# Patient Record
Sex: FEMALE | Race: WHITE | Employment: FULL TIME | URBAN - METROPOLITAN AREA
[De-identification: names, ages, dates, MRNs, and addresses within clinical notes are randomized per-mention and may not be internally consistent; named-entity substitution may affect disease eponyms.]

---

## 2017-01-03 ENCOUNTER — HOSPITAL ENCOUNTER (OUTPATIENT)
Dept: PHYSICAL THERAPY | Age: 62
Discharge: HOME | End: 2017-01-03
Admitting: PHYSICAL MEDICINE & REHABILITATION

## 2017-01-05 ENCOUNTER — HOSPITAL ENCOUNTER (OUTPATIENT)
Dept: PHYSICAL THERAPY | Age: 62
Discharge: HOME | End: 2017-01-05
Admitting: PHYSICAL MEDICINE & REHABILITATION

## 2017-01-10 ENCOUNTER — HOSPITAL ENCOUNTER (OUTPATIENT)
Dept: PHYSICAL THERAPY | Age: 62
Discharge: HOME | End: 2017-01-10
Admitting: PHYSICAL MEDICINE & REHABILITATION

## 2017-01-12 ENCOUNTER — HOSPITAL ENCOUNTER (OUTPATIENT)
Dept: PHYSICAL THERAPY | Age: 62
Discharge: HOME | End: 2017-01-12
Admitting: PHYSICAL MEDICINE & REHABILITATION

## 2018-08-20 ENCOUNTER — OFFICE VISIT (OUTPATIENT)
Dept: FAMILY MEDICINE CLINIC | Age: 63
End: 2018-08-20

## 2018-08-20 VITALS
RESPIRATION RATE: 12 BRPM | BODY MASS INDEX: 31.92 KG/M2 | WEIGHT: 187 LBS | SYSTOLIC BLOOD PRESSURE: 112 MMHG | DIASTOLIC BLOOD PRESSURE: 68 MMHG | HEIGHT: 64 IN | HEART RATE: 65 BPM | OXYGEN SATURATION: 98 %

## 2018-08-20 DIAGNOSIS — E66.9 OBESITY, CLASS I, BMI 30.0-34.9 (SEE ACTUAL BMI): ICD-10-CM

## 2018-08-20 DIAGNOSIS — Z11.4 SCREENING FOR HUMAN IMMUNODEFICIENCY VIRUS WITHOUT PRESENCE OF RISK FACTORS: ICD-10-CM

## 2018-08-20 DIAGNOSIS — Z12.4 ENCOUNTER FOR PAPANICOLAOU SMEAR FOR CERVICAL CANCER SCREENING: ICD-10-CM

## 2018-08-20 DIAGNOSIS — Z11.59 ENCOUNTER FOR HCV SCREENING TEST FOR LOW RISK PATIENT: ICD-10-CM

## 2018-08-20 DIAGNOSIS — I10 ESSENTIAL HYPERTENSION: Primary | ICD-10-CM

## 2018-08-20 DIAGNOSIS — M47.9 OSTEOARTHRITIS OF SPINE, UNSPECIFIED SPINAL OSTEOARTHRITIS COMPLICATION STATUS, UNSPECIFIED SPINAL REGION: ICD-10-CM

## 2018-08-20 DIAGNOSIS — M85.80 OSTEOPENIA, UNSPECIFIED LOCATION: ICD-10-CM

## 2018-08-20 DIAGNOSIS — Z86.010 HISTORY OF COLON POLYPS: ICD-10-CM

## 2018-08-20 DIAGNOSIS — M54.2 NECK PAIN: ICD-10-CM

## 2018-08-20 DIAGNOSIS — G43.109 MIGRAINE WITH AURA AND WITHOUT STATUS MIGRAINOSUS, NOT INTRACTABLE: ICD-10-CM

## 2018-08-20 PROBLEM — E78.00 HYPERCHOLESTEROLEMIA: Status: ACTIVE | Noted: 2018-08-20

## 2018-08-20 PROBLEM — Z86.0100 HISTORY OF COLON POLYPS: Status: ACTIVE | Noted: 2018-08-20

## 2018-08-20 PROBLEM — E66.811 OBESITY, CLASS I, BMI 30.0-34.9 (SEE ACTUAL BMI): Status: ACTIVE | Noted: 2018-08-20

## 2018-08-20 PROCEDURE — 99204 OFFICE O/P NEW MOD 45 MIN: CPT | Performed by: INTERNAL MEDICINE

## 2018-08-20 RX ORDER — BACLOFEN 10 MG/1
10 TABLET ORAL
COMMUNITY
Start: 2016-12-19 | End: 2018-08-20 | Stop reason: SDUPTHER

## 2018-08-20 RX ORDER — LISINOPRIL 10 MG/1
TABLET ORAL
Qty: 90 TABLET | Refills: 2 | Status: SHIPPED | OUTPATIENT
Start: 2018-08-20 | End: 2018-12-21 | Stop reason: SDUPTHER

## 2018-08-20 RX ORDER — BACLOFEN 10 MG/1
TABLET ORAL
Qty: 30 TABLET | Refills: 0 | Status: SHIPPED | OUTPATIENT
Start: 2018-08-20 | End: 2018-08-24 | Stop reason: SDUPTHER

## 2018-08-20 ASSESSMENT — PATIENT HEALTH QUESTIONNAIRE - PHQ9
SUM OF ALL RESPONSES TO PHQ QUESTIONS 1-9: 0
1. LITTLE INTEREST OR PLEASURE IN DOING THINGS: 0
2. FEELING DOWN, DEPRESSED OR HOPELESS: 0
SUM OF ALL RESPONSES TO PHQ9 QUESTIONS 1 & 2: 0
SUM OF ALL RESPONSES TO PHQ QUESTIONS 1-9: 0

## 2018-08-20 NOTE — LETTER
[address] [facility] [address] [id number]  2018 February  MAMM-SCREENING DIRECT DIGITAL2/15/2018  [facility]  Result Impression   IMPRESSION: Benign findings    BI-RADS CATEGORY: 2 - BENIGN    RECOMMENDED FOLLOW-UP: Bilateral Follow up Mamm in 1 Yr. This study was performed and interpreted using full-field digital  mammographic and computer-aided detection. The [employer] of Radiology recommends annual screening  mammography for women age 36 and above. Please note that mammography is not 100% accurate in diagnosing breast  cancer. A routine breast examination is recommended to correlate with  mammographic findings. Signed By: Serena Correia MD, Heber Steward   Result Narrative   FULL-FIELD DIGITAL SCREENING MAMMOGRAPHY    REASON FOR EXAMINATION: Screening    COMPARISON: Mammograms dating back to 9/10/2013    DENSITY: There are scattered areas of fibroglandular density. FINDINGS:   There is no suspicious mass or architectural distortion. There are small, stable circumscribed masses in both breasts. There are no suspicious grouped calcifications. A few scattered  calcifications are unchanged. Other Result Information   Interface, Incoming Radiology Results - 02/15/2018 10:42 AM EST  FULL-FIELD DIGITAL SCREENING MAMMOGRAPHY    REASON FOR EXAMINATION: Screening    COMPARISON: Mammograms dating back to 9/10/2013    DENSITY: There are scattered areas of fibroglandular density. FINDINGS:   There is no suspicious mass or architectural distortion. There are small, stable circumscribed masses in both breasts. There are no suspicious grouped calcifications. A few scattered  calcifications are unchanged. IMPRESSION: Benign findings    BI-RADS CATEGORY: 2 - BENIGN    RECOMMENDED FOLLOW-UP: Bilateral Follow up Mamm in 1 Yr. This study was performed and interpreted using full-field digital  mammographic and computer-aided detection.

## 2018-08-20 NOTE — PROGRESS NOTES
HPI: Charo Mercedes presents for refills and new PCP with insurance change    HTN  medication was started secondary to eye changes seen by an ophthalmologist. Does not check her blood pressure. Is walking intermittently. Does hills. No chest pain palpitations or lower extremity edema. No prediabetes or hyperlipidemia on last testing. Is fasting today. Never had any functional studies. No family history of early myocardial infarction. Low back pain. DJD neck and low back. Aquatic therapy was helpful. Left buttock pain. Uses baclofen and motrin prn. No bowel or bladder symptoms with this. Symptoms appear to be stable.   no abnormal. No breast or ovarian cancer. No STD concerns. Rarely sexually active. No STD concerns. Is up-to-date with her mammograms. Specialists    optho    Physical medicine/rehab    Dermatology    PMH:    Ankle fracture     Colonoscopy with polyps             SH  and daughter. 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 . Work from home area and rare alcohol. No tobacco or recreational drugs. FH 4 sisters    +sister with colon issue but not sure what was, DM, MI,     - breast or ovarian cancer, mental illness    Use systems: Ocular migraines. No concussions or seizures. Up-to-date with eye exams. Glasses. Up-to-date with dentist. Denies any wheezing pneumonias abnormal chest x-rays. No chest pain palpitations. No breast masses or discharge. Rare GE reflux. Positive colon polyps to recheck in 2019. Denies any kidney stones. Remote urinary tract infection. No vaginal discharge. Mild dyspareunia. Follows with dermatology. History of basal cell? No depression or anxiety. BMI is up. No knee pain. Knows apnea.         Constitutional, ent, CV, respiratory, GI, , joint, skin, allergic and psychiatric ROS negative except for above    Allergies   Allergen Reactions    Sulfa Antibiotics Hives       Outpatient Prescriptions Marked as Taking for the 16 vagina. No suspicious skin lesions or nodules. Chemistry        Component Value Date/Time     05/24/2015 0459    K 4.2 05/24/2015 0459     05/24/2015 0459    CO2 23 05/24/2015 0459    BUN 14 05/24/2015 0459    CREATININE 0.5 (L) 05/24/2015 0459        Component Value Date/Time    CALCIUM 8.8 05/24/2015 0459            Lab Results   Component Value Date    WBC 6.6 05/23/2015    HGB 11.2 (L) 05/24/2015    HCT 34.4 (L) 05/24/2015    MCV 88.2 05/23/2015     05/23/2015     No results found for: LABA1C  No results found for: EAG  No results found for: LABA1C  No components found for: CHLPL  No results found for: TRIG  No results found for: HDL  No results found for: LDLCALC  No results found for: LABVLDL    Old labs and records reviewed or requested  Discussed past lab and studies with patient      Diagnosis Orders   1. Essential hypertension  Lipid Panel    Basic Metabolic Panel   2. Neck pain     3. Osteoarthritis of spine, unspecified spinal osteoarthritis complication status, unspecified spinal region     4. Obesity, Class I, BMI 30.0-34.9 (see actual BMI)  Hemoglobin A1C   5. Osteopenia, unspecified location  Vitamin D 25 Hydroxy   6. History of colon polyps  CBC   7. Migraine with aura and without status migrainosus, not intractable     8. Screening for human immunodeficiency virus without presence of risk factors  HIV Screen   9. Encounter for HCV screening test for low risk patient  Hepatitis C Antibody   10. Encounter for Papanicolaou smear for cervical cancer screening  PAP SMEAR     Hypertension good control on current medication. We'll continue on with this. Refill given. Basic metabolic profile. Lipid profile. Routine health maintenance we'll check hepatitis C and HIV. No high risk factors. Obesity. A1c to be obtained. Pap most likely to be her last.    Release of records for her colonoscopy.  Recheck in 2019        Return in about 1 month (around 9/20/2018), or MA weight check 6 month BP follow up. Diagnosis and treatment discussed.   Possible side effects of medication reviewed  Patients questions answered  Follow up understood  Pt aware if they are not contacted about any test results , this does not mean they are normal.  They should call

## 2018-08-20 NOTE — PATIENT INSTRUCTIONS
Patient Education        Well Visit, Women 48 to 72: Care Instructions  Your Care Instructions    Physical exams can help you stay healthy. Your doctor has checked your overall health and may have suggested ways to take good care of yourself. He or she also may have recommended tests. At home, you can help prevent illness with healthy eating, regular exercise, and other steps. Follow-up care is a key part of your treatment and safety. Be sure to make and go to all appointments, and call your doctor if you are having problems. It's also a good idea to know your test results and keep a list of the medicines you take. How can you care for yourself at home? · Reach and stay at a healthy weight. This will lower your risk for many problems, such as obesity, diabetes, heart disease, and high blood pressure. · Get at least 30 minutes of exercise on most days of the week. Walking is a good choice. You also may want to do other activities, such as running, swimming, cycling, or playing tennis or team sports. · Do not smoke. Smoking can make health problems worse. If you need help quitting, talk to your doctor about stop-smoking programs and medicines. These can increase your chances of quitting for good. · Protect your skin from too much sun. When you're outdoors from 10 a.m. to 4 p.m., stay in the shade or cover up with clothing and a hat with a wide brim. Wear sunglasses that block UV rays. Even when it's cloudy, put broad-spectrum sunscreen (SPF 30 or higher) on any exposed skin. · See a dentist one or two times a year for checkups and to have your teeth cleaned. · Wear a seat belt in the car. · Limit alcohol to 1 drink a day. Too much alcohol can cause health problems. Follow your doctor's advice about when to have certain tests. These tests can spot problems early. · Cholesterol.  Your doctor will tell you how often to have this done based on your age, family history, or other things that can increase your smoke or have diabetes to show what your risk for a heart attack or stroke is over the next 10 years. When should you call for help? Watch closely for changes in your health, and be sure to contact your doctor if you have any problems or symptoms that concern you. Where can you learn more? Go to https://chpepiceweb.healthTrippin In. org and sign in to your Qianmi account. Enter N575 in the [facility] box to learn more about \"Well Visit, Women 50 to 72: Care Instructions. \"     If you do not have an account, please click on the \"Sign Up Now\" link. Current as of: May 16, 2017  Content Version: 11.7  © 6473-3400 [employer]. Care instructions adapted under license by [facility] ([facility]). If you have questions about a medical condition or this instruction, always ask your healthcare professional. [address] any warranty or liability for your use of this information. Patient Education        Learning About Low-Carbohydrate Diets for Weight Loss  What is a low-carbohydrate diet? Low-carb diets avoid foods that are high in carbohydrate. These high-carb foods include pasta, bread, rice, cereal, fruits, and starchy vegetables. Instead, these diets usually have you eat foods that are high in fat and protein. Many people lose weight quickly on a low-carb diet. But the early weight loss is water. People on this diet often gain the weight back after they start eating carbs again. Not all diet plans are safe or work well. A lot of the evidence shows that low-carb diets aren't healthy. That's because these diets often don't include healthy foods like fruits and vegetables. Losing weight safely means balancing protein, fat, and carbs with every meal and snack. And low-carb diets don't always provide the vitamins, minerals, and fiber you need. If you have a serious medical condition, talk to your doctor before you try any diet.  These conditions include kidney disease, heart Weight Loss Plan: Care Instructions  Your Care Instructions    If you are thinking about losing weight, it can be hard to know where to start. Your doctor can help you set up a weight loss plan that best meets your needs. You may want to take a class on nutrition or exercise, or join a weight loss support group. If you have questions about how to make changes to your eating or exercise habits, ask your doctor about seeing a registered dietitian or an exercise specialist.  It can be a big challenge to lose weight. But you do not have to make huge changes at once. Make small changes, and stick with them. When those changes become habit, add a few more changes. If you do not think you are ready to make changes right now, try to pick a date in the future. Make an appointment to see your doctor to discuss whether the time is right for you to start a plan. Follow-up care is a key part of your treatment and safety. Be sure to make and go to all appointments, and call your doctor if you are having problems. It's also a good idea to know your test results and keep a list of the medicines you take. How can you care for yourself at home? · Set realistic goals. Many people expect to lose much more weight than is likely. A weight loss of 5% to 10% of your body weight may be enough to improve your health. · Get family and friends involved to provide support. Talk to them about why you are trying to lose weight, and ask them to help. They can help by participating in exercise and having meals with you, even if they may be eating something different. · Find what works best for you. If you do not have time or do not like to cook, a program that offers meal replacement bars or shakes may be better for you. Or if you like to prepare meals, finding a plan that includes daily menus and recipes may be best.  · Ask your doctor about other health professionals who can help you achieve your weight loss goals.   ¨ A dietitian can help you make healthy changes in your diet. ¨ An exercise specialist or  can help you develop a safe and effective exercise program.  ¨ A counselor or psychiatrist can help you cope with issues such as depression, anxiety, or family problems that can make it hard to focus on weight loss. · Consider joining a support group for people who are trying to lose weight. Your doctor can suggest groups in your area. Where can you learn more? Go to https://TechFaith Wireless TechnologypeNSL Renewable Powereb.Profig. org and sign in to your AdventureDrop account. Enter P397 in the [employer] box to learn more about \"Starting a Weight Loss Plan: Care Instructions. \"     If you do not have an account, please click on the \"Sign Up Now\" link. Current as of: October 9, 2017  Content Version: 11.7  © 7114-6841 [employer]. Care instructions adapted under license by [facility] ([facility]). If you have questions about a medical condition or this instruction, always ask your healthcare professional. [address] any warranty or liability for your use of this information.

## 2018-08-23 ENCOUNTER — TELEPHONE (OUTPATIENT)
Dept: FAMILY MEDICINE CLINIC | Age: 63
End: 2018-08-23

## 2018-08-23 LAB
HPV COMMENT: NORMAL
HPV TYPE 16: NOT DETECTED
HPV TYPE 18: NOT DETECTED
HPVOH (OTHER TYPES): NOT DETECTED

## 2018-08-24 ENCOUNTER — TELEPHONE (OUTPATIENT)
Dept: FAMILY MEDICINE CLINIC | Age: 63
End: 2018-08-24

## 2018-08-24 RX ORDER — BACLOFEN 10 MG/1
TABLET ORAL
Qty: 30 TABLET | Refills: 0 | Status: SHIPPED | OUTPATIENT
Start: 2018-08-24 | End: 2019-02-27 | Stop reason: SDUPTHER

## 2018-08-27 ENCOUNTER — TELEPHONE (OUTPATIENT)
Dept: FAMILY MEDICINE CLINIC | Age: 63
End: 2018-08-27

## 2018-08-27 LAB
AVERAGE GLUCOSE: NORMAL
BASOPHILS ABSOLUTE: 51 /ΜL
BASOPHILS RELATIVE PERCENT: 1.1 %
BUN BLDV-MCNC: 17 MG/DL
CALCIUM SERPL-MCNC: 9.8 MG/DL
CHLORIDE BLD-SCNC: 104 MMOL/L
CHOLESTEROL, TOTAL: 232 MG/DL
CHOLESTEROL/HDL RATIO: 2.8
CO2: 26 MMOL/L
CREAT SERPL-MCNC: 0.75 MG/DL
EOSINOPHILS ABSOLUTE: 138 /ΜL
EOSINOPHILS RELATIVE PERCENT: 3 %
GFR CALCULATED: NORMAL
GLUCOSE BLD-MCNC: 102 MG/DL
HBA1C MFR BLD: 5.7 %
HCT VFR BLD CALC: 39.4 % (ref 36–46)
HDLC SERPL-MCNC: 83 MG/DL (ref 35–70)
HEMOGLOBIN: 12.9 G/DL (ref 12–16)
LDL CHOLESTEROL CALCULATED: 134 MG/DL (ref 0–160)
LYMPHOCYTES ABSOLUTE: 2291 /ΜL
LYMPHOCYTES RELATIVE PERCENT: 49.8 %
MCH RBC QN AUTO: 28.4 PG
MCHC RBC AUTO-ENTMCNC: 32.7 G/DL
MCV RBC AUTO: 86.6 FL
MONOCYTES ABSOLUTE: 340 /ΜL
MONOCYTES RELATIVE PERCENT: 7.4 %
NEUTROPHILS ABSOLUTE: 1780 /ΜL
NEUTROPHILS RELATIVE PERCENT: 38.7 %
PDW BLD-RTO: 13.1 %
PLATELET # BLD: 261 K/ΜL
PMV BLD AUTO: 9.7 FL
POTASSIUM SERPL-SCNC: 4.5 MMOL/L
RBC # BLD: 4.55 10^6/ΜL
SODIUM BLD-SCNC: 139 MMOL/L
TRIGL SERPL-MCNC: 59 MG/DL
VLDLC SERPL CALC-MCNC: ABNORMAL MG/DL
WBC # BLD: 4.6 10^3/ML

## 2018-08-28 LAB — HIV AG/AB: NON REACTIVE

## 2018-09-20 ENCOUNTER — NURSE ONLY (OUTPATIENT)
Dept: FAMILY MEDICINE CLINIC | Age: 63
End: 2018-09-20

## 2018-09-20 VITALS — BODY MASS INDEX: 32.1 KG/M2 | WEIGHT: 187 LBS

## 2018-09-20 DIAGNOSIS — Z23 NEED FOR INFLUENZA VACCINATION: Primary | ICD-10-CM

## 2018-09-20 PROCEDURE — 90682 RIV4 VACC RECOMBINANT DNA IM: CPT | Performed by: INTERNAL MEDICINE

## 2018-09-20 PROCEDURE — 90471 IMMUNIZATION ADMIN: CPT | Performed by: INTERNAL MEDICINE

## 2018-09-20 NOTE — PROGRESS NOTES
Vaccine Information Sheet, \"Influenza - Inactivated\"  given to Charo Mercedes, or parent/legal guardian of  Charo Mercedes and verbalized understanding. Patient responses:    Have you ever had a reaction to a flu vaccine? No  Are you able to eat eggs without adverse effects? Yes  Do you have any current illness? No  Have you ever had [place] Syndrome? No    Flu vaccine given per order. Please see immunization tab.     Given in left deltoid-BR

## 2018-12-21 DIAGNOSIS — I10 ESSENTIAL HYPERTENSION: Primary | ICD-10-CM

## 2018-12-21 RX ORDER — LISINOPRIL 10 MG/1
TABLET ORAL
Qty: 90 TABLET | Refills: 2 | Status: SHIPPED | OUTPATIENT
Start: 2018-12-21 | End: 2019-08-27 | Stop reason: SDUPTHER

## 2018-12-28 NOTE — TELEPHONE ENCOUNTER
Patient requesting a medication refill.   Medication: Lisinopril  Dosage: 10 mg  Frequency: 1 po q day for blood pressure  Last filled on: 12/21/2018  Pharmacy: [facility]  Next office visit: 02/27/2019

## 2019-02-27 ENCOUNTER — OFFICE VISIT (OUTPATIENT)
Dept: FAMILY MEDICINE CLINIC | Age: 64
End: 2019-02-27
Payer: MEDICARE

## 2019-02-27 VITALS
SYSTOLIC BLOOD PRESSURE: 119 MMHG | BODY MASS INDEX: 31.58 KG/M2 | HEIGHT: 64 IN | DIASTOLIC BLOOD PRESSURE: 80 MMHG | WEIGHT: 185 LBS | RESPIRATION RATE: 12 BRPM | HEART RATE: 62 BPM | OXYGEN SATURATION: 99 %

## 2019-02-27 DIAGNOSIS — Z23 NEED FOR PNEUMOCOCCAL VACCINATION: ICD-10-CM

## 2019-02-27 DIAGNOSIS — E78.00 HYPERCHOLESTEROLEMIA: ICD-10-CM

## 2019-02-27 DIAGNOSIS — M85.862 OTHER SPECIFIED DISORDERS OF BONE DENSITY AND STRUCTURE, LEFT LOWER LEG: ICD-10-CM

## 2019-02-27 DIAGNOSIS — M85.80 OSTEOPENIA, UNSPECIFIED LOCATION: ICD-10-CM

## 2019-02-27 DIAGNOSIS — Z86.010 HISTORY OF COLON POLYPS: ICD-10-CM

## 2019-02-27 DIAGNOSIS — M70.62 TROCHANTERIC BURSITIS OF LEFT HIP: Primary | ICD-10-CM

## 2019-02-27 DIAGNOSIS — E66.9 OBESITY, CLASS I, BMI 30.0-34.9 (SEE ACTUAL BMI): ICD-10-CM

## 2019-02-27 DIAGNOSIS — I10 ESSENTIAL HYPERTENSION: ICD-10-CM

## 2019-02-27 DIAGNOSIS — M47.9 OSTEOARTHRITIS OF SPINE, UNSPECIFIED SPINAL OSTEOARTHRITIS COMPLICATION STATUS, UNSPECIFIED SPINAL REGION: ICD-10-CM

## 2019-02-27 PROCEDURE — G8417 CALC BMI ABV UP PARAM F/U: HCPCS | Performed by: INTERNAL MEDICINE

## 2019-02-27 PROCEDURE — 1090F PRES/ABSN URINE INCON ASSESS: CPT | Performed by: INTERNAL MEDICINE

## 2019-02-27 PROCEDURE — G8400 PT W/DXA NO RESULTS DOC: HCPCS | Performed by: INTERNAL MEDICINE

## 2019-02-27 PROCEDURE — 4040F PNEUMOC VAC/ADMIN/RCVD: CPT | Performed by: INTERNAL MEDICINE

## 2019-02-27 PROCEDURE — G8427 DOCREV CUR MEDS BY ELIG CLIN: HCPCS | Performed by: INTERNAL MEDICINE

## 2019-02-27 PROCEDURE — G8482 FLU IMMUNIZE ORDER/ADMIN: HCPCS | Performed by: INTERNAL MEDICINE

## 2019-02-27 PROCEDURE — 90670 PCV13 VACCINE IM: CPT | Performed by: INTERNAL MEDICINE

## 2019-02-27 PROCEDURE — 99214 OFFICE O/P EST MOD 30 MIN: CPT | Performed by: INTERNAL MEDICINE

## 2019-02-27 PROCEDURE — G0009 ADMIN PNEUMOCOCCAL VACCINE: HCPCS | Performed by: INTERNAL MEDICINE

## 2019-02-27 PROCEDURE — 1123F ACP DISCUSS/DSCN MKR DOCD: CPT | Performed by: INTERNAL MEDICINE

## 2019-02-27 PROCEDURE — 1101F PT FALLS ASSESS-DOCD LE1/YR: CPT | Performed by: INTERNAL MEDICINE

## 2019-02-27 PROCEDURE — 3017F COLORECTAL CA SCREEN DOC REV: CPT | Performed by: INTERNAL MEDICINE

## 2019-02-27 PROCEDURE — 1036F TOBACCO NON-USER: CPT | Performed by: INTERNAL MEDICINE

## 2019-02-27 RX ORDER — BACLOFEN 10 MG/1
TABLET ORAL
Qty: 30 TABLET | Refills: 0 | Status: SHIPPED | OUTPATIENT
Start: 2019-02-27 | End: 2019-07-02

## 2019-02-27 ASSESSMENT — PATIENT HEALTH QUESTIONNAIRE - PHQ9
1. LITTLE INTEREST OR PLEASURE IN DOING THINGS: 0
2. FEELING DOWN, DEPRESSED OR HOPELESS: 0
SUM OF ALL RESPONSES TO PHQ QUESTIONS 1-9: 0
SUM OF ALL RESPONSES TO PHQ9 QUESTIONS 1 & 2: 0
SUM OF ALL RESPONSES TO PHQ QUESTIONS 1-9: 0

## 2019-04-15 ENCOUNTER — HOSPITAL ENCOUNTER (OUTPATIENT)
Dept: WOMENS IMAGING | Age: 64
Discharge: HOME | End: 2019-04-15
Payer: MEDICARE

## 2019-04-15 DIAGNOSIS — M85.80 OSTEOPENIA, UNSPECIFIED LOCATION: ICD-10-CM

## 2019-04-15 DIAGNOSIS — M85.862 OTHER SPECIFIED DISORDERS OF BONE DENSITY AND STRUCTURE, LEFT LOWER LEG: ICD-10-CM

## 2019-04-15 PROCEDURE — 77080 DXA BONE DENSITY AXIAL: CPT

## 2019-07-02 RX ORDER — BACLOFEN 10 MG/1
TABLET ORAL
Qty: 27 TABLET | Refills: 0 | Status: SHIPPED | OUTPATIENT
Start: 2019-07-02 | End: 2019-08-27 | Stop reason: SDUPTHER

## 2019-08-26 PROBLEM — Z12.11 SCREEN FOR COLON CANCER: Status: ACTIVE | Noted: 2018-08-20

## 2019-08-27 ENCOUNTER — OFFICE VISIT (OUTPATIENT)
Dept: FAMILY MEDICINE CLINIC | Age: 64
End: 2019-08-27
Payer: MEDICARE

## 2019-08-27 VITALS
WEIGHT: 178 LBS | HEIGHT: 64 IN | RESPIRATION RATE: 12 BRPM | SYSTOLIC BLOOD PRESSURE: 130 MMHG | DIASTOLIC BLOOD PRESSURE: 81 MMHG | HEART RATE: 60 BPM | BODY MASS INDEX: 30.39 KG/M2 | OXYGEN SATURATION: 99 %

## 2019-08-27 DIAGNOSIS — M47.9 OSTEOARTHRITIS OF SPINE, UNSPECIFIED SPINAL OSTEOARTHRITIS COMPLICATION STATUS, UNSPECIFIED SPINAL REGION: ICD-10-CM

## 2019-08-27 DIAGNOSIS — I10 ESSENTIAL HYPERTENSION: Primary | ICD-10-CM

## 2019-08-27 DIAGNOSIS — E78.00 HYPERCHOLESTEROLEMIA: ICD-10-CM

## 2019-08-27 DIAGNOSIS — E66.9 OBESITY, CLASS I, BMI 30.0-34.9 (SEE ACTUAL BMI): ICD-10-CM

## 2019-08-27 DIAGNOSIS — R73.03 PREDIABETES: ICD-10-CM

## 2019-08-27 LAB
ANION GAP SERPL CALCULATED.3IONS-SCNC: 14 MMOL/L (ref 3–16)
BUN BLDV-MCNC: 14 MG/DL (ref 7–20)
CALCIUM SERPL-MCNC: 9.8 MG/DL (ref 8.3–10.6)
CHLORIDE BLD-SCNC: 103 MMOL/L (ref 99–110)
CO2: 26 MMOL/L (ref 21–32)
CREAT SERPL-MCNC: 0.7 MG/DL (ref 0.6–1.2)
GFR AFRICAN AMERICAN: >60
GFR NON-AFRICAN AMERICAN: >60
GLUCOSE BLD-MCNC: 97 MG/DL (ref 70–99)
POTASSIUM SERPL-SCNC: 4.3 MMOL/L (ref 3.5–5.1)
SODIUM BLD-SCNC: 143 MMOL/L (ref 136–145)

## 2019-08-27 PROCEDURE — G8427 DOCREV CUR MEDS BY ELIG CLIN: HCPCS | Performed by: INTERNAL MEDICINE

## 2019-08-27 PROCEDURE — G8417 CALC BMI ABV UP PARAM F/U: HCPCS | Performed by: INTERNAL MEDICINE

## 2019-08-27 PROCEDURE — 1090F PRES/ABSN URINE INCON ASSESS: CPT | Performed by: INTERNAL MEDICINE

## 2019-08-27 PROCEDURE — 4040F PNEUMOC VAC/ADMIN/RCVD: CPT | Performed by: INTERNAL MEDICINE

## 2019-08-27 PROCEDURE — 1123F ACP DISCUSS/DSCN MKR DOCD: CPT | Performed by: INTERNAL MEDICINE

## 2019-08-27 PROCEDURE — 36415 COLL VENOUS BLD VENIPUNCTURE: CPT | Performed by: INTERNAL MEDICINE

## 2019-08-27 PROCEDURE — 99214 OFFICE O/P EST MOD 30 MIN: CPT | Performed by: INTERNAL MEDICINE

## 2019-08-27 PROCEDURE — 3017F COLORECTAL CA SCREEN DOC REV: CPT | Performed by: INTERNAL MEDICINE

## 2019-08-27 PROCEDURE — G8399 PT W/DXA RESULTS DOCUMENT: HCPCS | Performed by: INTERNAL MEDICINE

## 2019-08-27 PROCEDURE — 1036F TOBACCO NON-USER: CPT | Performed by: INTERNAL MEDICINE

## 2019-08-27 RX ORDER — LISINOPRIL 10 MG/1
TABLET ORAL
Qty: 90 TABLET | Refills: 2 | Status: SHIPPED | OUTPATIENT
Start: 2019-08-27 | End: 2020-06-22

## 2019-08-27 RX ORDER — BACLOFEN 10 MG/1
TABLET ORAL
Qty: 27 TABLET | Refills: 0 | Status: SHIPPED | OUTPATIENT
Start: 2019-08-27 | End: 2020-03-13

## 2019-08-27 NOTE — PATIENT INSTRUCTIONS
Patient Education        Neck Spasm: Exercises  Introduction  Here are some examples of exercises for you to try. The exercises may be suggested for a condition or for rehabilitation. Start each exercise slowly. Ease off the exercises if you start to have pain. You will be told when to start these exercises and which ones will work best for you. How to do the exercises  Levator scapula stretch    1. Sit in a firm chair, or stand up straight. 2. Gently tilt your head toward your left shoulder. 3. Turn your head to look down into your armpit, bending your head slightly forward. Let the weight of your head stretch your neck muscles. 4. Hold for 15 to 30 seconds. 5. Return to your starting position. 6. Follow the same instructions above, but tilt your head toward your right shoulder. 7. Repeat 2 to 4 times toward each shoulder. Upper trapezius stretch    1. Sit in a firm chair, or stand up straight. 2. This stretch works best if you keep your shoulder down as you lean away from it. To help you remember to do this, start by relaxing your shoulders and lightly holding on to your thighs or your chair. 3. Tilt your head toward your shoulder and hold for 15 to 30 seconds. Let the weight of your head stretch your muscles. 4. If you would like a little added stretch, place your arm behind your back. Use the arm opposite of the direction you are tilting your head. For example, if you are tilting your head to the left, place your right arm behind your back. 5. Repeat 2 to 4 times toward each shoulder. Neck rotation    1. Sit in a firm chair, or stand up straight. 2. Keeping your chin level, turn your head to the right, and hold for 15 to 30 seconds. 3. Turn your head to the left, and hold for 15 to 30 seconds. 4. Repeat 2 to 4 times to each side. Chin tuck    1. Lie on the floor with a rolled-up towel under your neck. Your head should be touching the floor.   2. Slowly bring your chin toward the front of and safety. Be sure to make and go to all appointments, and call your doctor if you are having problems. It's also a good idea to know your test results and keep a list of the medicines you take. Where can you learn more? Go to https://rick.Tapad. org and sign in to your PivotLink account. Enter N593 in the [employer] box to learn more about \"Low Back Pain: Exercises. \"     If you do not have an account, please click on the \"Sign Up Now\" link. Current as of: September 20, 2018  Content Version: 12.1  © 7542-7937 [employer]. Care instructions adapted under license by [facility] ([facility]). If you have questions about a medical condition or this instruction, always ask your healthcare professional. [address] any warranty or liability for your use of this information. Patient Education        Well Visit, Over 72: Care Instructions  Your Care Instructions    Physical exams can help you stay healthy. Your doctor has checked your overall health and may have suggested ways to take good care of yourself. He or she also may have recommended tests. At home, you can help prevent illness with healthy eating, regular exercise, and other steps. Follow-up care is a key part of your treatment and safety. Be sure to make and go to all appointments, and call your doctor if you are having problems. It's also a good idea to know your test results and keep a list of the medicines you take. How can you care for yourself at home? · Reach and stay at a healthy weight. This will lower your risk for many problems, such as obesity, diabetes, heart disease, and high blood pressure. · Get at least 30 minutes of exercise on most days of the week. Walking is a good choice. You also may want to do other activities, such as running, swimming, cycling, or playing tennis or team sports. · Do not smoke. Smoking can make health problems worse.  If you need help quitting, talk to

## 2019-08-27 NOTE — PROGRESS NOTES
with dermatology. History of basal cell? No depression or anxiety. BMI is up. No knee pain. Knows apnea.        Constitutional, ent, CV, respiratory, GI, , joint, skin, allergic and psychiatric ROS reviewed and negative except for above    Allergies   Allergen Reactions    Sulfa Antibiotics Hives       Outpatient Medications Marked as Taking for the 8/27/19 encounter (Office Visit) with Allison Pulliam MD   Medication Sig Dispense Refill    baclofen (LIORESAL) 10 MG tablet TAKE ONE TABLET BY MOUTH EVERY NIGHT AT BEDTIME AS NEEDED FOR BACK PAIN 27 tablet 0    lisinopril (PRINIVIL;ZESTRIL) 10 MG tablet 1 po q day for blood pressure 90 tablet 2             Past Medical History:   Diagnosis Date    Essential hypertension 8/20/2018    Hypertension        Past Surgical History:   Procedure Laterality Date    ANKLE SURGERY Left 5/23/15    ORIF Left ankle fracture dislocation. No family history on file. Review of Systems        Objective     /81   Pulse 60   Resp 12   Ht 5' 4\" (1.626 m)   Wt 178 lb (80.7 kg)   LMP 05/23/2005 (Approximate)   SpO2 99% Comment: RA  BMI 30.55 kg/m²     @LASTSAO2(3)@    Wt Readings from Last 3 Encounters:   02/27/19 185 lb (83.9 kg)   09/20/18 187 lb (84.8 kg)   08/20/18 187 lb (84.8 kg)       Physical Exam     NAD alert and cooperative appears nervous  HEENT: TMs unremarkable. Fair dentition. Throat is clear. Good upstroke of the carotids. Full left side of the thyroid. Lungs are clear. No wheezes rales or rhonchi. Cardiovascular exam regular rate and rhythm without any murmur or click. Abdomen benign no hepatospleno megaly or epigastric tenderness. Good range of motion the hips. Mild crepitus in knees. Good pulses lower extremities.   No suspicious skin lesions or nodules        Chemistry        Component Value Date/Time     08/22/2018    K 4.5 08/22/2018     08/22/2018    CO2 26 08/22/2018    BUN 17 08/22/2018    CREATININE 0.75 08/22/2018        Component Value Date/Time    CALCIUM 9.8 08/22/2018            Lab Results   Component Value Date    WBC 4.6 08/22/2018    HGB 12.9 08/22/2018    HCT 39.4 08/22/2018    MCV 86.6 08/22/2018     08/22/2018     Lab Results   Component Value Date    LABA1C 5.7 08/22/2018     No results found for: EAG  Lab Results   Component Value Date    LABA1C 5.7 08/22/2018     No components found for: CHLPL  Lab Results   Component Value Date    TRIG 59 08/22/2018     Lab Results   Component Value Date    HDL 83 (A) 08/22/2018     Lab Results   Component Value Date    LDLCALC 134 08/22/2018     No results found for: LABVLDL    Old labs and records reviewed or requested  Discussed past lab and studies with patient      Diagnosis Orders   1. Essential hypertension  Basic Metabolic Panel    lisinopril (PRINIVIL;ZESTRIL) 10 MG tablet   2. Hypercholesterolemia     3. Obesity, Class I, BMI 30.0-34.9 (see actual BMI)     4. Osteoarthritis of spine, unspecified spinal osteoarthritis complication status, unspecified spinal region     5. Prediabetes  Hemoglobin A1C     Hypertension fair control continue current medication continue with weight loss and exercise. Hyperlipidemia. Low-cholesterol diet. No medication needed at this time. Osteoarthritis. Stretching possible physical therapy neck and back when she is ready. Prediabetes. Anticipate this is improved as she is lost weight. Return in about 6 months (around 2/27/2020), or BP follow up. Diagnosis and treatment discussed.   Possible side effects of medication reviewed  Patients questions answered  Follow up understood  Pt aware if they are not contacted about any test results , this does not mean they are normal.  They should call

## 2019-08-28 LAB
ESTIMATED AVERAGE GLUCOSE: 111.2 MG/DL
HBA1C MFR BLD: 5.5 %

## 2019-09-25 PROBLEM — Z12.11 SCREEN FOR COLON CANCER: Status: RESOLVED | Noted: 2018-08-20 | Resolved: 2019-09-25

## 2020-03-04 ENCOUNTER — OFFICE VISIT (OUTPATIENT)
Dept: FAMILY MEDICINE CLINIC | Age: 65
End: 2020-03-04
Payer: MEDICARE

## 2020-03-04 VITALS
RESPIRATION RATE: 16 BRPM | DIASTOLIC BLOOD PRESSURE: 88 MMHG | OXYGEN SATURATION: 99 % | WEIGHT: 177 LBS | BODY MASS INDEX: 30.22 KG/M2 | HEART RATE: 65 BPM | HEIGHT: 64 IN | SYSTOLIC BLOOD PRESSURE: 121 MMHG

## 2020-03-04 LAB
A/G RATIO: 2 (ref 1.1–2.2)
ALBUMIN SERPL-MCNC: 4.7 G/DL (ref 3.4–5)
ALP BLD-CCNC: 78 U/L (ref 40–129)
ALT SERPL-CCNC: 21 U/L (ref 10–40)
ANION GAP SERPL CALCULATED.3IONS-SCNC: 15 MMOL/L (ref 3–16)
AST SERPL-CCNC: 21 U/L (ref 15–37)
BILIRUB SERPL-MCNC: 0.5 MG/DL (ref 0–1)
BUN BLDV-MCNC: 16 MG/DL (ref 7–20)
CALCIUM SERPL-MCNC: 9.6 MG/DL (ref 8.3–10.6)
CHLORIDE BLD-SCNC: 103 MMOL/L (ref 99–110)
CO2: 25 MMOL/L (ref 21–32)
CREAT SERPL-MCNC: 0.6 MG/DL (ref 0.6–1.2)
GFR AFRICAN AMERICAN: >60
GFR NON-AFRICAN AMERICAN: >60
GLOBULIN: 2.4 G/DL
GLUCOSE BLD-MCNC: 92 MG/DL (ref 70–99)
HCT VFR BLD CALC: 37.4 % (ref 36–48)
HEMOGLOBIN: 12.3 G/DL (ref 12–16)
MCH RBC QN AUTO: 29.2 PG (ref 26–34)
MCHC RBC AUTO-ENTMCNC: 33 G/DL (ref 31–36)
MCV RBC AUTO: 88.6 FL (ref 80–100)
PDW BLD-RTO: 14 % (ref 12.4–15.4)
PLATELET # BLD: 211 K/UL (ref 135–450)
PMV BLD AUTO: 8.2 FL (ref 5–10.5)
POTASSIUM SERPL-SCNC: 4.5 MMOL/L (ref 3.5–5.1)
RBC # BLD: 4.22 M/UL (ref 4–5.2)
SODIUM BLD-SCNC: 143 MMOL/L (ref 136–145)
TOTAL PROTEIN: 7.1 G/DL (ref 6.4–8.2)
VITAMIN D 25-HYDROXY: 39.2 NG/ML
WBC # BLD: 3.9 K/UL (ref 4–11)

## 2020-03-04 PROCEDURE — 1090F PRES/ABSN URINE INCON ASSESS: CPT | Performed by: INTERNAL MEDICINE

## 2020-03-04 PROCEDURE — 36415 COLL VENOUS BLD VENIPUNCTURE: CPT | Performed by: INTERNAL MEDICINE

## 2020-03-04 PROCEDURE — 4040F PNEUMOC VAC/ADMIN/RCVD: CPT | Performed by: INTERNAL MEDICINE

## 2020-03-04 PROCEDURE — G8417 CALC BMI ABV UP PARAM F/U: HCPCS | Performed by: INTERNAL MEDICINE

## 2020-03-04 PROCEDURE — G8484 FLU IMMUNIZE NO ADMIN: HCPCS | Performed by: INTERNAL MEDICINE

## 2020-03-04 PROCEDURE — 1036F TOBACCO NON-USER: CPT | Performed by: INTERNAL MEDICINE

## 2020-03-04 PROCEDURE — 3017F COLORECTAL CA SCREEN DOC REV: CPT | Performed by: INTERNAL MEDICINE

## 2020-03-04 PROCEDURE — G8399 PT W/DXA RESULTS DOCUMENT: HCPCS | Performed by: INTERNAL MEDICINE

## 2020-03-04 PROCEDURE — 1123F ACP DISCUSS/DSCN MKR DOCD: CPT | Performed by: INTERNAL MEDICINE

## 2020-03-04 PROCEDURE — 99214 OFFICE O/P EST MOD 30 MIN: CPT | Performed by: INTERNAL MEDICINE

## 2020-03-04 PROCEDURE — G8427 DOCREV CUR MEDS BY ELIG CLIN: HCPCS | Performed by: INTERNAL MEDICINE

## 2020-03-04 ASSESSMENT — PATIENT HEALTH QUESTIONNAIRE - PHQ9
2. FEELING DOWN, DEPRESSED OR HOPELESS: 0
SUM OF ALL RESPONSES TO PHQ9 QUESTIONS 1 & 2: 0
SUM OF ALL RESPONSES TO PHQ QUESTIONS 1-9: 0
SUM OF ALL RESPONSES TO PHQ QUESTIONS 1-9: 0
1. LITTLE INTEREST OR PLEASURE IN DOING THINGS: 0

## 2020-03-04 NOTE — PATIENT INSTRUCTIONS
changes in your health, and be sure to contact your doctor if:    · You do not get better as expected.     · You continue to lose weight. Where can you learn more? Go to https://[employer]arl Energy.Nuovo [employer]ologics. org and sign in to your Recommind account. Enter A790 in the [employer] box to learn more about \"Abnormal Weight Loss: Care Instructions. \"     If you do not have an account, please click on the \"Sign Up Now\" link. Current as of: March 28, 2019  Content Version: 12.3  © 6538-3268 [employer]wise, Incorporated. Care instructions adapted under license by [facility] ([facility] If you have questions about a medical condition or this instruction, always ask your healthcare professional. [address] any warranty or liability for your use of this information. check on mammogram. Every 2 years ok  May consider bone density in next few years.

## 2020-03-04 NOTE — PROGRESS NOTES
HPI: Rebekah Cooper presents for month follow-up    Chronic health issues include hypertension, DJD, family history adenomatous polyp, prediabetes. Poorly after donating blood for about 24 hours. Wonders whether she should do this in the future. Felt somewhat weak. Some mild nausea. Pretension . Does not check blood pressures. No formal exercise other than some walking occasional chair yoga. No chest pain palpitations or edema. Positive prediabetes. Family history early myocardial infarction. Lisinopril 10 daily. No lower extremity edema. Feels like things are going well BMI 30         Low back pain. DJD neck and low back.  + right upper shoulder and neck discomfort. Aquatic therapy was helpful.  Left buttock pain. Uses baclofen and motrin prn. No bowel or bladder symptoms with this. Symptoms appear to be stable. May be interested in physical therapy in the future. Not ready yet. Does do stretching at home.       no abnormal PAP. No FH breast or ovarian cancer. No STD concerns. Rarely sexually active. No STD concerns. Juan du 2.     DJD neck pain and upper left arm and low back pain.      Colonoscopy . No polyps. Recheck in . Family history adenomatous polyps     Specialists    optho    Physical medicine/rehab    Dermatology    GI    Eye    dentist       PMH:    Ankle fracture     Colonoscopy with polyps               SH   Daughter and 2 babies in [facility] . [employer] hx retired . No consistent exercise. Rare alcohol. No tobacco or recreational drugs.         FH 4 sisters    +sister with adenomatous popyl\ DM, MI,     - breast or ovarian cancer, mental illness     Use systems: Ocular migraines less since long-term. . No concussions or seizures. Up-to-date with eye exams. Glasses. Up-to-date with dentist. Denies any wheezing pneumonias abnormal chest x-rays. No chest pain persistent palpitations. No breast masses or discharge. Rare GE reflux. if they are not contacted about any test results , this does not mean they are normal.  They should call

## 2020-03-05 LAB
ESTIMATED AVERAGE GLUCOSE: 116.9 MG/DL
HBA1C MFR BLD: 5.7 %

## 2020-03-13 RX ORDER — BACLOFEN 10 MG/1
TABLET ORAL
Qty: 27 TABLET | Refills: 0 | Status: SHIPPED | OUTPATIENT
Start: 2020-03-13 | End: 2020-12-21

## 2020-05-01 ASSESSMENT — LIFESTYLE VARIABLES
HOW OFTEN DURING THE LAST YEAR HAVE YOU FAILED TO DO WHAT WAS NORMALLY EXPECTED FROM YOU BECAUSE OF DRINKING: 0
AUDIT TOTAL SCORE: 3
HOW OFTEN DO YOU HAVE A DRINK CONTAINING ALCOHOL: 3
HOW OFTEN DO YOU HAVE A DRINK CONTAINING ALCOHOL: 2
HOW OFTEN DURING THE LAST YEAR HAVE YOU NEEDED AN ALCOHOLIC DRINK FIRST THING IN THE MORNING TO GET YOURSELF GOING AFTER A NIGHT OF HEAVY DRINKING: 0
AUDIT-C TOTAL SCORE: 3
HOW OFTEN DURING THE LAST YEAR HAVE YOU FOUND THAT YOU WERE NOT ABLE TO STOP DRINKING ONCE YOU HAD STARTED: 0
HOW OFTEN DO YOU HAVE SIX OR MORE DRINKS ON ONE OCCASION: 0
HOW MANY STANDARD DRINKS CONTAINING ALCOHOL DO YOU HAVE ON A TYPICAL DAY: 0
HAVE YOU OR SOMEONE ELSE BEEN INJURED AS A RESULT OF YOUR DRINKING: 0
HOW OFTEN DURING THE LAST YEAR HAVE YOU BEEN UNABLE TO REMEMBER WHAT HAPPENED THE NIGHT BEFORE BECAUSE YOU HAD BEEN DRINKING: 0
HOW OFTEN DURING THE LAST YEAR HAVE YOU HAD A FEELING OF GUILT OR REMORSE AFTER DRINKING: 0
HAS A RELATIVE, FRIEND, DOCTOR, OR ANOTHER HEALTH PROFESSIONAL EXPRESSED CONCERN ABOUT YOUR DRINKING OR SUGGESTED YOU CUT DOWN: 0

## 2020-05-01 ASSESSMENT — PATIENT HEALTH QUESTIONNAIRE - PHQ9
SUM OF ALL RESPONSES TO PHQ QUESTIONS 1-9: 0
SUM OF ALL RESPONSES TO PHQ QUESTIONS 1-9: 0

## 2020-05-04 ENCOUNTER — VIRTUAL VISIT (OUTPATIENT)
Dept: FAMILY MEDICINE CLINIC | Age: 65
End: 2020-05-04
Payer: MEDICARE

## 2020-05-04 PROCEDURE — 1123F ACP DISCUSS/DSCN MKR DOCD: CPT | Performed by: INTERNAL MEDICINE

## 2020-05-04 PROCEDURE — 4040F PNEUMOC VAC/ADMIN/RCVD: CPT | Performed by: INTERNAL MEDICINE

## 2020-05-04 PROCEDURE — 3017F COLORECTAL CA SCREEN DOC REV: CPT | Performed by: INTERNAL MEDICINE

## 2020-05-04 PROCEDURE — G0438 PPPS, INITIAL VISIT: HCPCS | Performed by: INTERNAL MEDICINE

## 2020-05-04 NOTE — LETTER
RECOMMENDATION: Follow up mammogram in 1 year    LATERALITY: Bilateral       Reminder: The patient was entered into a reminder system for annual screening mammography notification. This study was performed and interpreted using full-field digital mammographic and computer-aided detection. The [employer] recommends annual screening mammography for women age 36 and above. Please note that mammography is not 100% accurate in diagnosing breast cancer. A routine breast examination is recommended to correlate with mammographic findings.     Signed By: Graeme Kocher MD, [address]

## 2020-05-04 NOTE — PROGRESS NOTES
exercise for at least 20 minutes 2-3 times per week?: Yes  Have you lost any weight without trying in the past 3 months?: No  Do you eat fewer than 2 meals per day?: No  Have you seen a dentist within the past year?: Yes  There is no height or weight on file to calculate BMI. Health Habits/Nutrition Interventions:  · Patient with obesity.   Information on weight loss    Safety:  Safety  Do you have working smoke detectors?: Yes  Have all throw rugs been removed or fastened?: Yes  Do you have non-slip mats or surfaces in all bathtubs/showers?: (!) No  Do all of your stairways have a railing or banister?: Yes  Are your doorways, halls and stairs free of clutter?: Yes  Do you always fasten your seatbelt when you are in a car?: Yes  Safety Interventions:  · Home safety tips provided    Personalized Preventive Plan   Current Health Maintenance Status  Immunization History   Administered Date(s) Administered    DTaP 01/07/2014    Hepatitis A 01/07/2014, 08/14/2014    Influenza Virus Vaccine 09/30/2019    Influenza, Tena Dixons, Recombinant, IM PF (Flublok 18 yrs and older) 09/20/2018    Pneumococcal Conjugate 13-valent (Cleotis Sergeant) 02/27/2019    Pneumococcal Polysaccharide ([id number]) 05/24/2015    Zoster Live (Zostavax) 12/16/2013    Zoster Recombinant (Shingrix) 09/30/2019        Health Maintenance   Topic Date Due    Annual Wellness Visit (AWV)  05/29/2019    Breast cancer screen  02/15/2020    Pneumococcal 65+ years Vaccine (2 of 2 - PPSV23) 05/24/2020    A1C test (Diabetic or Prediabetic)  03/04/2021    Potassium monitoring  03/04/2021    Creatinine monitoring  03/04/2021    Lipid screen  08/22/2023    DTaP/Tdap/Td vaccine (2 - Tdap) 01/07/2024    Colon cancer screen colonoscopy  10/09/2024    DEXA (modify frequency per FRAX score)  Completed    Flu vaccine  Completed    Shingles Vaccine  Completed    Hepatitis C screen  Completed    Hepatitis A vaccine  Aged Out    Hepatitis B vaccine  Aged C/ David Matt 19 Hib vaccine  Aged Out    Meningococcal (ACWY) vaccine  Aged Out     Recommendations for [employer] Due: see orders and patient instructions/AVS.  . Recommended screening schedule for the next 5-10 years is provided to the patient in written form: see Patient Instructions/AVS.    There are no diagnoses linked to this encounter.

## 2020-05-04 NOTE — PATIENT INSTRUCTIONS
Personalized Preventive Plan for Eleuterio Staley - 5/4/2020  Medicare offers a range of preventive health benefits. Some of the tests and screenings are paid in full while other may be subject to a deductible, co-insurance, and/or copay. Some of these benefits include a comprehensive review of your medical history including lifestyle, illnesses that may run in your family, and various assessments and screenings as appropriate. After reviewing your medical record and screening and assessments performed today your provider may have ordered immunizations, labs, imaging, and/or referrals for you. A list of these orders (if applicable) as well as your Preventive Care list are included within your After Visit Summary for your review. Other Preventive Recommendations:    · A preventive eye exam performed by an eye specialist is recommended every 1-2 years to screen for glaucoma; cataracts, macular degeneration, and other eye disorders. · A preventive dental visit is recommended every 6 months. · Try to get at least 150 minutes of exercise per week or 10,000 steps per day on a pedometer . · Order or download the FREE \"Exercise & Physical Activity: Your Everyday Guide\" from [employer]. Call 1-352.496.2539 or search [employer] online. · You need 2239-8348 mg of calcium and 0614-1686 IU of vitamin D per day. It is possible to meet your calcium requirement with diet alone, but a vitamin D supplement is usually necessary to meet this goal.  · When exposed to the sun, use a sunscreen that protects against both UVA and UVB radiation with an SPF of 30 or greater. Reapply every 2 to 3 hours or after sweating, drying off with a towel, or swimming. · Always wear a seat belt when traveling in a car. Always wear a helmet when riding a bicycle or motorcycle. Heart-Healthy Diet   Sodium, Fat, and Cholesterol Controlled Diet       What Is a Heart Healthy Diet?    A heart-healthy example, this would mean 60 grams of fat or less per day. Saturated fat and trans fat in your diet raises your blood cholesterol the most, much more than dietary cholesterol does. For this reason, on a heart-healthy diet, less than 7% of your calories should come from saturated fat and ideally 0% from trans fat. On an 1800-calorie diet, this translates into less than 14 grams of saturated fat per day, leaving 46 grams of fat to come from mono- and polyunsaturated fats.    Food Choices on a Heart Healthy Diet   Food Category   Foods Recommended   Foods to Avoid   Grains   Breads and rolls without salted tops Most dry and cooked cereals Unsalted crackers and breadsticks Low-sodium or homemade breadcrumbs or stuffing All rice and pastas   Breads, rolls, and crackers with salted tops High-fat baked goods (eg, muffins, donuts, pastries) Quick breads, self-rising flour, and biscuit mixes Regular bread crumbs Instant hot cereals Commercially prepared rice, pasta, or stuffing mixes   Vegetables   Most fresh, frozen, and low-sodium canned vegetables Low-sodium and salt-free vegetable juices Canned vegetables if unsalted or rinsed   Regular canned vegetables and juices, including sauerkraut and pickled vegetables Frozen vegetables with sauces Commercially prepared potato and vegetable mixes   Fruits   Most fresh, frozen, and canned fruits All fruit juices   Fruits processed with salt or sodium   Milk   Nonfat or low-fat (1%) milk Nonfat or low-fat yogurt Cottage cheese, low-fat ricotta, cheeses labeled as low-fat and low-sodium   Whole milk Reduced-fat (2%) milk Malted and chocolate milk Full fat yogurt Most cheeses (unless low-fat and low salt) Buttermilk (no more than 1 cup per week)   Meats and Beans   Lean cuts of fresh or frozen beef, veal, lamb, or pork (look for the word loin) Fresh or frozen poultry without the skin Fresh or frozen fish and some shellfish Egg whites and egg substitutes (Limit whole eggs to three per 30 or greater is considered to be obese  A waist circumference greater than 35 inches (88 cm) in women and 40 inches (102 cm) in men increases the risk of obesity-related complications, such as heart disease and diabetes. People who are obese and who have a larger waist size may need more aggressive weight loss treatment than others. Talk to your doctor or nurse for advice. Types of treatment -- Based on your measurements and your medical history, your doctor or nurse can determine what combination of weight loss treatments would work best for you. Treatments may include changes in lifestyle, exercise, dieting, and, in some cases, weight loss medicines or weight loss surgery. Weight loss surgery, also called bariatric surgery, is reserved for people with severe obesity who have not responded to other weight loss treatments. SETTING A WEIGHT LOSS GOAL -- It is important to set a realistic weight loss goal. Your first goal should be to avoid gaining more weight and staying at your current weight (or within 5 percent). Many people have a \"dream\" weight that is difficult or impossible to achieve. People at high risk of developing diabetes who are able to lose 5 percent of their body weight and maintain this weight will reduce their risk of developing diabetes by about 50 percent and reduce their blood pressure. This is a success. Losing more than 15 percent of your body weight and staying at this weight is an extremely good result, even if you never reach your \"dream\" or \"ideal\" weight. LIFESTYLE CHANGES -- Programs that help you to change your lifestyle are usually run by psychologists or other professionals. The goals of lifestyle changes are to help you change your eating habits, become more active, and be more aware of how much you eat and exercise, helping you to make healthier choices. This type of treatment can be broken down into three steps:   The triggers that make you want to eat   Eating   What happens it. For example, the lap band will not work well if you eat or drink a large amount of liquid calories (like ice cream). The band will not help you to feel full when you eat/drink liquid calories. Weight loss ranges from 45 to 75 percent after two years. As an example, a person who is 120 pounds overweight could expect to lose approximately 54 to 90 pounds in the two years after lap banding. Gastric bypass -- Jay-en-Y gastric bypass, also called gastric bypass, helps you to lose weight by reducing the amount of food you can eat and reducing the number of calories and nutrients you absorb from the food you eat. To perform gastric bypass, a surgeon creates a small stomach pouch by dividing the stomach and attaching it to the small intestine. This helps you to lose weight in two ways: The smaller stomach can hold less food than before surgery. This causes you to feel full after eating a very small amount of food or liquid. Over time, the pouch might stretch, allowing you to eat more food. The body absorbs fewer calories, since food bypasses most of the stomach as well as the upper small intestine. This new arrangement seems to decrease your appetite and change how you break down foods by changing the release of various hormones. Gastric bypass can be performed as open surgery (through an incision on the abdomen) or laparoscopically, which uses smaller incisions and smaller instruments. Both the laparoscopic and open techniques have risks and benefits. You and your surgeon should work together to decide which surgery, if any, is right for you. Gastric bypass has a high success rate, and people lose an average of 62 to 68 percent of their excess body weight in the first year. Weight loss typically levels off after one to two years, with an overall excess weight loss between 50 and 75 percent. For a person who is 120 pounds overweight, an average of 60 to 90 pounds of weight loss would be expected.   Gastric sleeve -- Gastric sleeve, also known as sleeve gastrectomy, is a surgery that reduces the size of the stomach and makes it into a narrow tube (figure 3). The new stomach is much smaller and produces less of the hormone (ghrelin) that causes hunger, helping you feel satisfied with less food. Sleeve gastrectomy is safer than gastric bypass because the intestines are not rearranged, and there is less chance of malnutrition. It also appears to control hunger better than lap banding. It might be safer than the lap banding because no foreign materials are used. The gastric sleeve has a good success rate, and people lose an average of 33 percent of their excess body weight in the first year. For a person who is 120 pounds overweight, this would mean losing about 40 pounds in the first year. WEIGHT LOSS SURGERY COMPLICATIONS -- A variety of complications can occur with weight loss surgery. The risks of surgery depend upon which surgery you have and any medical problems you had before surgery. Some of the more common early surgical complications (one to six weeks after surgery) include:  Bleeding   Infection   Blockage or tear in the bowels   Need for further surgery  Important medical complications after surgery can include blood clots in the legs or lungs, heart attack, pneumonia, and urinary tract infection. Complications are less likely when surgery is performed in centers that are experienced in weight loss surgery. In general, centers with experience in weight loss surgery have:  Board-certified doctors and surgeons   A team of support staff (dietitians, counselors, nurses)   Long-term follow-up after surgery   Hospital staff experienced with the care of weight loss patients. This includes nurses who are trained in the care of patients immediately after surgery and anesthesiologists who are experienced in caring for the morbidly obese.   EFFECTIVENESS OF WEIGHT LOSS SURGERY -- The goal of weight loss surgery is to reduce the risk of illness or death associated with obesity. Weight loss surgery can also help you to feel and look better, reduce the amount of money you spend on medicines, and cut down on sick days. As an example, weight loss surgery can improve health problems related to obesity (diabetes, high blood pressure, high cholesterol, sleep apnea) to the point that you need less or no medicine. Finally, weight loss surgery might reduce your risk of developing heart disease, cancer, and certain infections. AFTER WEIGHT LOSS SURGERY -- You will need to stay in the hospital until your team feels that it is safe for you to leave (on average, one to three days). Do not drive if you are taking prescription pain medicine. Begin exercising as soon as possible once you have healed; most weight loss centers will design an exercise program for you. Once you are home, it is important to eat and drink exactly what your doctor and dietitian recommend. You will see your doctor, nurse, and dietitian on a regular basis after surgery to monitor your health, diet, and weight loss. You will be able to slowly increase how much you eat over time, although it will always be important to:  Eat small, frequent meals and not skip meals   Chew your food slowly and completely   Avoid eating while \"distracted\" (such as eating while watching TV)   Stop eating when you feel full   Drink liquids at least 30 minutes before or after eating   Avoid foods high in fat or sugar   Take vitamin supplements, as recommended  It can take several months to learn to listen to your body so that you know when you are hungry and when you are full. You may dislike foods you previously loved, and you may begin to prefer new foods. This can be a frustrating process for some people, so talk to your dietitian if you are having trouble. It usually takes between one and two years to lose weight after surgery.  After reaching their goal weight, some people have Enter [id number] in the [facility] box to learn more about \"Learning About [address]. \"     If you do not have an account, please click on the \"Sign Up Now\" link. Current as of: December 8, 2019Content Version: 12.4  © 4750-7699 [employer]. Care instructions adapted under license by [facility] ([facility]). If you have questions about a medical condition or this instruction, always ask your healthcare professional. [address] any warranty or liability for your use of this information.     ·

## 2020-06-22 RX ORDER — LISINOPRIL 10 MG/1
TABLET ORAL
Qty: 90 TABLET | Refills: 1 | Status: SHIPPED | OUTPATIENT
Start: 2020-06-22 | End: 2020-09-28 | Stop reason: SDUPTHER

## 2020-09-28 ENCOUNTER — OFFICE VISIT (OUTPATIENT)
Dept: FAMILY MEDICINE CLINIC | Age: 65
End: 2020-09-28
Payer: MEDICARE

## 2020-09-28 VITALS
DIASTOLIC BLOOD PRESSURE: 80 MMHG | SYSTOLIC BLOOD PRESSURE: 134 MMHG | HEART RATE: 59 BPM | BODY MASS INDEX: 30.05 KG/M2 | HEIGHT: 64 IN | RESPIRATION RATE: 16 BRPM | WEIGHT: 176 LBS | OXYGEN SATURATION: 99 %

## 2020-09-28 LAB
ANION GAP SERPL CALCULATED.3IONS-SCNC: 10 MMOL/L (ref 3–16)
BASOPHILS ABSOLUTE: 0 K/UL (ref 0–0.2)
BASOPHILS RELATIVE PERCENT: 0.7 %
BUN BLDV-MCNC: 15 MG/DL (ref 7–20)
CALCIUM SERPL-MCNC: 9.7 MG/DL (ref 8.3–10.6)
CHLORIDE BLD-SCNC: 107 MMOL/L (ref 99–110)
CHOLESTEROL, TOTAL: 220 MG/DL (ref 0–199)
CO2: 26 MMOL/L (ref 21–32)
CREAT SERPL-MCNC: 0.6 MG/DL (ref 0.6–1.2)
EOSINOPHILS ABSOLUTE: 0.1 K/UL (ref 0–0.6)
EOSINOPHILS RELATIVE PERCENT: 3 %
GFR AFRICAN AMERICAN: >60
GFR NON-AFRICAN AMERICAN: >60
GLUCOSE BLD-MCNC: 96 MG/DL (ref 70–99)
HCT VFR BLD CALC: 37.8 % (ref 36–48)
HDLC SERPL-MCNC: 75 MG/DL (ref 40–60)
HEMOGLOBIN: 12.4 G/DL (ref 12–16)
LDL CHOLESTEROL CALCULATED: 131 MG/DL
LYMPHOCYTES ABSOLUTE: 1.8 K/UL (ref 1–5.1)
LYMPHOCYTES RELATIVE PERCENT: 46.1 %
MAGNESIUM: 2.1 MG/DL (ref 1.8–2.4)
MCH RBC QN AUTO: 28.7 PG (ref 26–34)
MCHC RBC AUTO-ENTMCNC: 32.7 G/DL (ref 31–36)
MCV RBC AUTO: 87.7 FL (ref 80–100)
MONOCYTES ABSOLUTE: 0.3 K/UL (ref 0–1.3)
MONOCYTES RELATIVE PERCENT: 7.1 %
NEUTROPHILS ABSOLUTE: 1.7 K/UL (ref 1.7–7.7)
NEUTROPHILS RELATIVE PERCENT: 43.1 %
PDW BLD-RTO: 14 % (ref 12.4–15.4)
PLATELET # BLD: 241 K/UL (ref 135–450)
PMV BLD AUTO: 8.1 FL (ref 5–10.5)
POTASSIUM SERPL-SCNC: 4.3 MMOL/L (ref 3.5–5.1)
RBC # BLD: 4.31 M/UL (ref 4–5.2)
SODIUM BLD-SCNC: 143 MMOL/L (ref 136–145)
TRIGL SERPL-MCNC: 69 MG/DL (ref 0–150)
VLDLC SERPL CALC-MCNC: 14 MG/DL
WBC # BLD: 3.9 K/UL (ref 4–11)

## 2020-09-28 PROCEDURE — 1090F PRES/ABSN URINE INCON ASSESS: CPT | Performed by: INTERNAL MEDICINE

## 2020-09-28 PROCEDURE — 3017F COLORECTAL CA SCREEN DOC REV: CPT | Performed by: INTERNAL MEDICINE

## 2020-09-28 PROCEDURE — 1123F ACP DISCUSS/DSCN MKR DOCD: CPT | Performed by: INTERNAL MEDICINE

## 2020-09-28 PROCEDURE — G8417 CALC BMI ABV UP PARAM F/U: HCPCS | Performed by: INTERNAL MEDICINE

## 2020-09-28 PROCEDURE — 4004F PT TOBACCO SCREEN RCVD TLK: CPT | Performed by: INTERNAL MEDICINE

## 2020-09-28 PROCEDURE — G8427 DOCREV CUR MEDS BY ELIG CLIN: HCPCS | Performed by: INTERNAL MEDICINE

## 2020-09-28 PROCEDURE — G0009 ADMIN PNEUMOCOCCAL VACCINE: HCPCS | Performed by: INTERNAL MEDICINE

## 2020-09-28 PROCEDURE — 36415 COLL VENOUS BLD VENIPUNCTURE: CPT | Performed by: INTERNAL MEDICINE

## 2020-09-28 PROCEDURE — G8399 PT W/DXA RESULTS DOCUMENT: HCPCS | Performed by: INTERNAL MEDICINE

## 2020-09-28 PROCEDURE — 90694 VACC AIIV4 NO PRSRV 0.5ML IM: CPT | Performed by: INTERNAL MEDICINE

## 2020-09-28 PROCEDURE — 93000 ELECTROCARDIOGRAM COMPLETE: CPT | Performed by: INTERNAL MEDICINE

## 2020-09-28 PROCEDURE — 4040F PNEUMOC VAC/ADMIN/RCVD: CPT | Performed by: INTERNAL MEDICINE

## 2020-09-28 PROCEDURE — 90732 PPSV23 VACC 2 YRS+ SUBQ/IM: CPT | Performed by: INTERNAL MEDICINE

## 2020-09-28 PROCEDURE — G0008 ADMIN INFLUENZA VIRUS VAC: HCPCS | Performed by: INTERNAL MEDICINE

## 2020-09-28 PROCEDURE — 99214 OFFICE O/P EST MOD 30 MIN: CPT | Performed by: INTERNAL MEDICINE

## 2020-09-28 RX ORDER — LANOLIN ALCOHOL/MO/W.PET/CERES
3 CREAM (GRAM) TOPICAL DAILY
COMMUNITY

## 2020-09-28 RX ORDER — LISINOPRIL 10 MG/1
TABLET ORAL
Qty: 90 TABLET | Refills: 1 | Status: SHIPPED | OUTPATIENT
Start: 2020-09-28 | End: 2021-06-17

## 2020-09-28 NOTE — PROGRESS NOTES
HPI: Jame Patel presents for concern with foot pain, repeat colonoscopy, change in stool, and transient palpitations. Chronic health issues include hypertension, DJD, family history adenomatous polyp, prediabetes. ,  BMI 30,    Colonoscopy  without polyps. Was to recheck in 5 to 7 years. Notes some changes in stool. Family history adenomatous polyps. No blood in the stool. Wishes to see her 's GI doctor. No nausea or vomiting. Foot pain and bunions. Her fears with activities. Some Voltaren gel that she may try. Wishes to a different podiatrist.  Difficulty with walking. Good shoes with support. Stretches. Positive osteoarthritis. DJD neck and low back doing better. Does stretching. Tylenol plus Advil. Hip discomfort improved with stretching exercises. Start aquatic therapy. No new bowel or bladder symptoms. Things are doing better with spine standpoint    States she was not able to donate blood last time because her hematocrit was too low. Did have mild leukopenia 6 months ago. No pica. No bloody stools. Amenorrheic.      4 months ago had month ago 3-4 days ago irregular beats in pm.  No recurrence. Not exertional related. Not irregularly irregular. Not fast.  Felt like rhythmic skipped beats on occasion. Not a problem in the past.  Wonders that she may have drink too much caffeine that day. No decongestants. Hypertension. Good cholesterol profile.        HTN . Does not check blood pressures. No formal exercise other than some walking occasional chair yoga positive stretching. .  Increased exercise intolerance. A1c 5.2020 Family history early myocardial infarction. Lisinopril 10 daily. No lower extremity edema. Feels like things are going well BMI 30              no abnormal PAP. No FH breast or ovarian cancer. No STD concerns. Rarely sexually active. No STD concerns.  BI-RADS 2 mammogram 2019.   Osteopenia T score -1.8 femoral neck 5 03/04/2020     Lab Results   Component Value Date    .9 03/04/2020     Lab Results   Component Value Date    LABA1C 5.7 03/04/2020     No components found for: CHLPL  Lab Results   Component Value Date    TRIG 59 08/22/2018     Lab Results   Component Value Date    HDL 83 (A) 08/22/2018     Lab Results   Component Value Date    LDLCALC 134 08/22/2018     No results found for: LABVLDL    Old labs and records reviewed or requested  Discussed past lab and studies with patient      Diagnosis Orders   1. Essential hypertension  EKG 12 Lead    Basic Metabolic Panel   2. Osteopenia, unspecified location     3. Hypercholesterolemia  Lipid Panel   4. Migraine with aura and without status migrainosus, not intractable     5. Obesity, Class I, BMI 30.0-34.9 (see actual BMI)     6. Osteoarthritis of spine, unspecified spinal osteoarthritis complication status, unspecified spinal region     7. Need for influenza vaccination  INFLUENZA, QUADV, ADJUVANTED, 65 YRS =, IM, PF, PREFILL SYR, 0.5ML (FLUAD)   8. FH: colon polyps  External Referral To Gastroenterology   9. Change in stool  External Referral To Gastroenterology   10. Pain in both feet  Amb External Referral To Podiatry   11. Palpitation  EKG 12 Lead    Magnesium   12. Anemia, unspecified type  CBC Auto Differential       Hypertension good control on current medications. Osteopenia. Good vitamin D. Continue on with her exercise and supplement. Hyperlipidemia good control. Obesity. Discussed weight loss. Osteoarthritis. Continue stretching and exercise. Family history of colonic polyps change in stool. Colonoscopy. Foot pain. Podiatry referral.    Palpitation. If recurrent persistent further evaluation will get EKG today as well as magnesium. Historic anemia when she tried to donate blood. Mild leukopenia last test.  We will get her CBC differential and platelets    General health maintenance. Pneumonia vaccine and flu vaccine given.     No follow-ups on file. Diagnosis and treatment discussed.   Possible side effects of medication reviewed  Patients questions answered  Follow up understood  Pt aware if they are not contacted about any test results , this does not mean they are normal.  They should call

## 2020-09-28 NOTE — PATIENT INSTRUCTIONS
Continue nice stretching. Decrease calories. 10 pound weight loss prior to next exam    Call for specialty appointments. Patient Education        Well Visit, Over 72: Care Instructions  Your Care Instructions     Physical exams can help you stay healthy. Your doctor has checked your overall health and may have suggested ways to take good care of yourself. He or she also may have recommended tests. At home, you can help prevent illness with healthy eating, regular exercise, and other steps. Follow-up care is a key part of your treatment and safety. Be sure to make and go to all appointments, and call your doctor if you are having problems. It's also a good idea to know your test results and keep a list of the medicines you take. How can you care for yourself at home? · Reach and stay at a healthy weight. This will lower your risk for many problems, such as obesity, diabetes, heart disease, and high blood pressure. · Get at least 30 minutes of exercise on most days of the week. Walking is a good choice. You also may want to do other activities, such as running, swimming, cycling, or playing tennis or team sports. · Do not smoke. Smoking can make health problems worse. If you need help quitting, talk to your doctor about stop-smoking programs and medicines. These can increase your chances of quitting for good. · Protect your skin from too much sun. When you're outdoors from 10 a.m. to 4 p.m., stay in the shade or cover up with clothing and a hat with a wide brim. Wear sunglasses that block UV rays. Even when it's cloudy, put broad-spectrum sunscreen (SPF 30 or higher) on any exposed skin. · See a dentist one or two times a year for checkups and to have your teeth cleaned. · Wear a seat belt in the car. Follow your doctor's advice about when to have certain tests. These tests can spot problems early. For men and women  · Cholesterol.  Your doctor will tell you how often to have this done based on your overall health and other things that can increase your risk for heart attack and stroke. · Blood pressure. Have your blood pressure checked during a routine doctor visit. Your doctor will tell you how often to check your blood pressure based on your age, your blood pressure results, and other factors. · Diabetes. Ask your doctor whether you should have tests for diabetes. · Vision. Experts recommend that you have yearly exams for glaucoma and other age-related eye problems. · Hearing. Tell your doctor if you notice any change in your hearing. You can have tests to find out how well you hear. · Colon cancer tests. Keep having colon cancer tests as your doctor recommends. You can have one of several types of tests. · Heart attack and stroke risk. At least every 4 to 6 years, you should have your risk for heart attack and stroke assessed. Your doctor uses factors such as your age, blood pressure, cholesterol, and whether you smoke or have diabetes to show what your risk for a heart attack or stroke is over the next 10 years. · Osteoporosis. Talk to your doctor about whether you should have a bone density test to find out whether you have thinning bones. Ask your doctor if you need to take a calcium plus vitamin D supplement. You may be able to get enough calcium and vitamin D through your diet. For women  · Pap test and pelvic exam. You may no longer need a Pap test. Talk with your doctor about whether to stop or continue to have Pap tests. · Breast exam and mammogram. Ask how often you should have a mammogram, which is an X-ray of your breasts. A mammogram can spot breast cancer before it can be felt and when it is easiest to treat. · Thyroid disease. Talk to your doctor about whether to have your thyroid checked as part of a regular physical exam. Women have an increased chance of a thyroid problem.   For men  · Prostate exam. Talk to your doctor about whether you should have a blood test (called a PSA test) to find the cause of your palpitations. Follow-up care is a key part of your treatment and safety. Be sure to make and go to all appointments, and call your doctor if you are having problems. It's also a good idea to know your test results and keep a list of the medicines you take. How can you care for yourself at home? · Avoid caffeine, nicotine, and excess alcohol. · Do not take illegal drugs, such as methamphetamines and cocaine. · Do not take weight loss or diet medicines unless you talk with your doctor first.  · Get plenty of sleep. · Do not overeat. · If you have palpitations again, take deep breaths and try to relax. This may slow a racing heart. · If you start to feel lightheaded, lie down to avoid injuries that might result if you pass out and fall down. · Keep a record of your palpitations and bring it to your next doctor's appointment. Write down:  ? The date and time. ? Your pulse. (If your heart is beating fast, it may be hard to count your pulse.)  ? What you were doing when the palpitations started. ? How long the palpitations lasted. ? Any other symptoms. · If an activity causes palpitations, slow down or stop. Talk to your doctor before you do that activity again. · Take your medicines exactly as prescribed. Call your doctor if you think you are having a problem with your medicine. When should you call for help? [id number] anytime you think you may need emergency care. For example, call if:  · You passed out (lost consciousness). · You have symptoms of a heart attack. These may include:  ? Chest pain or pressure, or a strange feeling in the chest.  ? Sweating. ? Shortness of breath. ? Pain, pressure, or a strange feeling in the back, neck, jaw, or upper belly or in one or both shoulders or arms. ? Lightheadedness or sudden weakness. ? A fast or irregular heartbeat. After you call 911, the  may tell you to chew 1 adult-strength or 2 to 4 low-dose aspirin.  Wait for an ambulance. Do not try to drive yourself. · You have symptoms of a stroke. These may include:  ? Sudden numbness, tingling, weakness, or loss of movement in your face, arm, or leg, especially on only one side of your body. ? Sudden vision changes. ? Sudden trouble speaking. ? Sudden confusion or trouble understanding simple statements. ? Sudden problems with walking or balance. ? A sudden, severe headache that is different from past headaches. Call your doctor now or seek immediate medical care if:  · You have heart palpitations and:  ? Are dizzy or lightheaded, or you feel like you may faint. ? Have new or increased shortness of breath. Watch closely for changes in your health, and be sure to contact your doctor if:  · You continue to have heart palpitations. Where can you learn more? Go to https://Peepsqueeze Incaminataeb.Store Eyes. org and sign in to your [employer] account. Enter R508 in the appCREAR box to learn more about \"Palpitations: Care Instructions. \"     If you do not have an account, please click on the \"Sign Up Now\" link. Current as of: December 16, 2019               Content Version: 12.5  © 2064-1055 Healthwise, Incorporated. Care instructions adapted under license by [facility] ([facility]). If you have questions about a medical condition or this instruction, always ask your healthcare professional. [address] any warranty or liability for your use of this information.

## 2020-10-23 PROBLEM — Z83.719 FAMILY HX COLONIC POLYPS: Status: ACTIVE | Noted: 2020-10-23

## 2020-10-23 PROBLEM — Z83.71 FAMILY HX COLONIC POLYPS: Status: ACTIVE | Noted: 2020-10-23

## 2020-12-21 RX ORDER — BACLOFEN 10 MG/1
TABLET ORAL
Qty: 27 TABLET | Refills: 0 | Status: SHIPPED | OUTPATIENT
Start: 2020-12-21 | End: 2021-06-17

## 2021-01-04 ENCOUNTER — VIRTUAL VISIT (OUTPATIENT)
Dept: FAMILY MEDICINE CLINIC | Age: 66
End: 2021-01-04
Payer: MEDICARE

## 2021-01-04 ENCOUNTER — TELEPHONE (OUTPATIENT)
Dept: FAMILY MEDICINE CLINIC | Age: 66
End: 2021-01-04

## 2021-01-04 DIAGNOSIS — H00.036 EYELID CELLULITIS, LEFT: Primary | ICD-10-CM

## 2021-01-04 PROCEDURE — 4040F PNEUMOC VAC/ADMIN/RCVD: CPT | Performed by: INTERNAL MEDICINE

## 2021-01-04 PROCEDURE — 3017F COLORECTAL CA SCREEN DOC REV: CPT | Performed by: INTERNAL MEDICINE

## 2021-01-04 PROCEDURE — G8484 FLU IMMUNIZE NO ADMIN: HCPCS | Performed by: INTERNAL MEDICINE

## 2021-01-04 PROCEDURE — G8417 CALC BMI ABV UP PARAM F/U: HCPCS | Performed by: INTERNAL MEDICINE

## 2021-01-04 PROCEDURE — 1123F ACP DISCUSS/DSCN MKR DOCD: CPT | Performed by: INTERNAL MEDICINE

## 2021-01-04 PROCEDURE — G8427 DOCREV CUR MEDS BY ELIG CLIN: HCPCS | Performed by: INTERNAL MEDICINE

## 2021-01-04 PROCEDURE — 1090F PRES/ABSN URINE INCON ASSESS: CPT | Performed by: INTERNAL MEDICINE

## 2021-01-04 PROCEDURE — G8510 SCR DEP NEG, NO PLAN REQD: HCPCS | Performed by: INTERNAL MEDICINE

## 2021-01-04 PROCEDURE — G8399 PT W/DXA RESULTS DOCUMENT: HCPCS | Performed by: INTERNAL MEDICINE

## 2021-01-04 PROCEDURE — 99212 OFFICE O/P EST SF 10 MIN: CPT | Performed by: INTERNAL MEDICINE

## 2021-01-04 PROCEDURE — 4004F PT TOBACCO SCREEN RCVD TLK: CPT | Performed by: INTERNAL MEDICINE

## 2021-01-04 RX ORDER — FLUCONAZOLE 150 MG/1
150 TABLET ORAL ONCE
Qty: 1 TABLET | Refills: 0 | Status: SHIPPED | OUTPATIENT
Start: 2021-01-04 | End: 2021-01-04

## 2021-01-04 RX ORDER — AMOXICILLIN AND CLAVULANATE POTASSIUM 875; 125 MG/1; MG/1
1 TABLET, FILM COATED ORAL 2 TIMES DAILY
Qty: 20 TABLET | Refills: 0 | Status: SHIPPED | OUTPATIENT
Start: 2021-01-04 | End: 2021-01-14

## 2021-01-04 ASSESSMENT — PATIENT HEALTH QUESTIONNAIRE - PHQ9
1. LITTLE INTEREST OR PLEASURE IN DOING THINGS: 0
SUM OF ALL RESPONSES TO PHQ QUESTIONS 1-9: 0
SUM OF ALL RESPONSES TO PHQ QUESTIONS 1-9: 0

## 2021-01-04 NOTE — TELEPHONE ENCOUNTER
PT called in stating that her left eye is swollen. PT says that she's been treating with warm compresses and baby shampoo but would like to know if she should make an appointment or not.      Please call PT back: [phone]

## 2021-01-04 NOTE — PROGRESS NOTES
The below patient isbeing evaluated by a Virtual Visit (video visit) encounter to address concerns as mentioned above. A caregiver was present when appropriate. Due to this being a TeleHealth encounter (During ECNAK-95 public health emergency), evaluation of the following organ systems was limited: Vitals/Constitutional/EENT/Resp/CV/GI//MS/Neuro/Skin/Heme-Lymph-Imm. Pursuant to the emergency declaration under the [address] [place] [address] authority and the [employer] Act, this Virtual Visit was conducted with patient's (and/or legal guardian's) consent, to reduce the patient's risk of exposure to COVID-19 and provide necessary medical care. The patient (and/or legal guardian) has also been advised to contact this office for worsening conditions or problems, and seek emergency medical treatment and/or call 911 if deemed necessary. Patient identification was verified at the start of the visit: Yes    Total time spent on this encounter: Not billed by time    Services were provided through a video synchronous discussion virtually to substitute for in-person clinic visit. Patient and provider were located at their individual homes. --Magdaleno Gray MD on 1/4/2021 at 9:50 AM    An electronic signature was used to authenticate this note. HPI: Bria Mcgregor presents for evaluation and management of eyelid swelling. Chronic health issues include hypertension, DJD, family history adenomatous polyp, prediabetes. ,  BMI 30,    3 days ago onset of slightly tenderness. No known trauma. Initially itchy. Been using warm compresses and baby shampoo. No conjunctival injection. No change in vision. Feels like she cannot open her eyelid completely. No fevers chills or systemic symptoms. Not had a problem with this in the past.  No blisters.   Mild itching.    SH   Daughter and 2 babies in [facility] . . 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  Rare alcohol. No tobacco or recreational drugs.         FH 4 sisters    +sister with adenomatous popyl\ DM, MI,     - breast or ovarian cancer, mental illness     Use systems: Ocular migraines less since care home. . No concussions or seizures. Up-to-date with eye exams. Glasses. Upcoming dermatology appointment. Up-to-date with dentist. Denies any wheezing pneumonias abnormal chest x-rays. No chest pain persistent palpitations. No breast masses or discharge. Rare GE reflux. Positive colon polyps to recheck Denies any kidney stones. Remote urinary tract infection. No vaginal discharge. Mild dyspareunia. Follows with dermatology. History of basal cell? No depression or anxiety. BMI is up. No knee pain. Knows apnea. Constitutional, ent, CV, respiratory, GI, , joint, skin, allergic and psychiatric ROS reviewed and negative except for above    Allergies   Allergen Reactions    Sulfa Antibiotics Hives       Outpatient Medications Marked as Taking for the 1/4/21 encounter (Virtual Visit) with Michelle Gavin MD   Medication Sig Dispense Refill    baclofen (LIORESAL) 10 MG tablet TAKE ONE TABLET BY MOUTH EVERY NIGHT AT BEDTIME AS NEEDED FOR  BACK PAIN 27 tablet 0    Acetaminophen (TYLENOL PO) Take by mouth as needed      melatonin 3 MG TABS tablet Take 3 mg by mouth daily      lisinopril (PRINIVIL;ZESTRIL) 10 MG tablet TAKE ONE TABLET BY MOUTH DAILY FOR BLOOD PRESSURE 90 tablet 1             Past Medical History:   Diagnosis Date    Essential hypertension 8/20/2018    History of ankle fracture 5/23/2015    Hypertension        Past Surgical History:   Procedure Laterality Date    ANKLE SURGERY Left 5/23/15    ORIF Left ankle fracture dislocation. No family history on file.       Review of Systems      Chemistry        Component Value Date/Time     09/28/2020 1046    K 4.3 09/28/2020 1046  09/28/2020 1046    CO2 26 09/28/2020 1046    BUN 15 09/28/2020 1046    CREATININE 0.6 09/28/2020 1046        Component Value Date/Time    CALCIUM 9.7 09/28/2020 1046    ALKPHOS 78 03/04/2020 1124    AST 21 03/04/2020 1124    ALT 21 03/04/2020 1124    BILITOT 0.5 03/04/2020 1124            NO vitals  as this was a virtual visit during cvod19 pandemic  She is appropriate. No conjunctival injection. No discharge. Extraocular muscles are intact. No vesicles. Mild erythema upper lid left no inferior lid involvement. Lab Results   Component Value Date    WBC 3.9 (L) 09/28/2020    HGB 12.4 09/28/2020    HCT 37.8 09/28/2020    MCV 87.7 09/28/2020     09/28/2020     Lab Results   Component Value Date    LABA1C 5.7 03/04/2020     Lab Results   Component Value Date    .9 03/04/2020     Lab Results   Component Value Date    LABA1C 5.7 03/04/2020     No components found for: CHLPL  Lab Results   Component Value Date    TRIG 69 09/28/2020    TRIG 59 08/22/2018     Lab Results   Component Value Date    HDL 75 (H) 09/28/2020    HDL 83 (A) 08/22/2018     Lab Results   Component Value Date    LDLCALC 131 (H) 09/28/2020    LDLCALC 134 08/22/2018     Lab Results   Component Value Date    LABVLDL 14 09/28/2020       Old labs and records reviewed or requested  Discussed past lab and studies with patient      Diagnosis Orders   1. Eyelid cellulitis, left           No follow-ups on file. Diagnosis and treatment discussed.   Possible side effects of medication reviewed  Patients questions answered  Follow up understood  Pt aware if they are not contacted about any test results , this does not mean they are normal.  They should call

## 2021-01-22 ENCOUNTER — TELEPHONE (OUTPATIENT)
Dept: FAMILY MEDICINE CLINIC | Age: 66
End: 2021-01-22

## 2021-01-22 NOTE — TELEPHONE ENCOUNTER
Had Vv few weeks ago for issues with eye. States left eye is starting to swell and flare up again. wanting an appointment, no availability. Please advise.  pt is reachable at [phone]

## 2021-01-22 NOTE — TELEPHONE ENCOUNTER
Call pt and schedule with Dr Mehdi Andres next week. If symptoms become severe over the weekend, she should go to urgent care    Please document call and then close encounter.   thanks

## 2021-01-25 ENCOUNTER — OFFICE VISIT (OUTPATIENT)
Dept: FAMILY MEDICINE CLINIC | Age: 66
End: 2021-01-25
Payer: MEDICARE

## 2021-01-25 VITALS
SYSTOLIC BLOOD PRESSURE: 131 MMHG | WEIGHT: 179 LBS | HEART RATE: 64 BPM | HEIGHT: 64 IN | RESPIRATION RATE: 12 BRPM | TEMPERATURE: 97 F | DIASTOLIC BLOOD PRESSURE: 87 MMHG | BODY MASS INDEX: 30.56 KG/M2

## 2021-01-25 DIAGNOSIS — M65.4 DE QUERVAIN'S DISEASE (TENOSYNOVITIS): ICD-10-CM

## 2021-01-25 DIAGNOSIS — H02.846 SWELLING OF LEFT EYELID: Primary | ICD-10-CM

## 2021-01-25 PROCEDURE — G8417 CALC BMI ABV UP PARAM F/U: HCPCS | Performed by: INTERNAL MEDICINE

## 2021-01-25 PROCEDURE — 4040F PNEUMOC VAC/ADMIN/RCVD: CPT | Performed by: INTERNAL MEDICINE

## 2021-01-25 PROCEDURE — 1090F PRES/ABSN URINE INCON ASSESS: CPT | Performed by: INTERNAL MEDICINE

## 2021-01-25 PROCEDURE — G8427 DOCREV CUR MEDS BY ELIG CLIN: HCPCS | Performed by: INTERNAL MEDICINE

## 2021-01-25 PROCEDURE — 1123F ACP DISCUSS/DSCN MKR DOCD: CPT | Performed by: INTERNAL MEDICINE

## 2021-01-25 PROCEDURE — G8484 FLU IMMUNIZE NO ADMIN: HCPCS | Performed by: INTERNAL MEDICINE

## 2021-01-25 PROCEDURE — 99212 OFFICE O/P EST SF 10 MIN: CPT | Performed by: INTERNAL MEDICINE

## 2021-01-25 PROCEDURE — 4004F PT TOBACCO SCREEN RCVD TLK: CPT | Performed by: INTERNAL MEDICINE

## 2021-01-25 PROCEDURE — G8399 PT W/DXA RESULTS DOCUMENT: HCPCS | Performed by: INTERNAL MEDICINE

## 2021-01-25 PROCEDURE — 3017F COLORECTAL CA SCREEN DOC REV: CPT | Performed by: INTERNAL MEDICINE

## 2021-01-25 NOTE — PATIENT INSTRUCTIONS
Ice, motrin or advil  If recurrent redness or eyelid swelling will refill antibiotic. Lets use anti inflammatory for now. Referral optho   Get online care: [facility]   Address: [address], [address] [place] [address]   Phone: [phone]   Appointments: [facility]      Patient Education        Jacqueline Espinozaadis Tenosynovitis: Care Instructions  Your Care Instructions  Jacqueline Breen (say \"Maurice\") tenosynovitis is a problem that makes the bottom of your thumb and the side of your wrist hurt. When you have de Quervain's, the ropey fiber (tendon) that helps move your thumb away from your fingers becomes swollen. You may have pain when you move your wrist or pick things up. You may hear a creaking sound when you move your wrist or thumb. Symptoms often get better in a few weeks with home care. Your doctor may want you to start some gentle stretching exercises once your symptoms are gone. Sometimes treatment with an injection or surgery is needed. Follow-up care is a key part of your treatment and safety. Be sure to make and go to all appointments, and call your doctor if you are having problems. It's also a good idea to know your test results and keep a list of the medicines you take. How can you care for yourself at home? · Until your symptoms are better, stop the activities that caused the pain. · Avoid moving the hand and wrist that hurt. · Follow your doctor's directions for wearing a splint to keep your thumb and wrist from moving. · Try ice or heat. ? Put ice or a cold pack on your thumb and wrist for 10 to 20 minutes at a time. Put a thin cloth between the ice and your skin. ? You can use heat for 20 to 30 minutes, 2 or 3 times a day. Try using a heating pad, hot shower, or hot pack. · Ask your doctor if you can take an over-the-counter pain medicine, such as acetaminophen (Tylenol), ibuprofen (Advil, Motrin), or naproxen (Aleve). Be safe with medicines.  Read and follow all instructions on the label. When should you call for help? Watch closely for changes in your health, and be sure to contact your doctor if:    · You have new or worse pain.     · You have new or worse numbness or tingling in your hand or fingers.     · Your hand feels weaker.     · You do not get better as expected. Where can you learn more? Go to https://chpepiceweb.Cuyana. org and sign in to your [employer] account. Enter O601 in the [facility] box to learn more about \"De Quervain's Tenosynovitis: Care Instructions. \"     If you do not have an account, please click on the \"Sign Up Now\" link. Current as of: March 2, 2020               Content Version: 12.6  © 0504-5052 Ensequence, Incorporated. Care instructions adapted under license by [facility] ([facility]). If you have questions about a medical condition or this instruction, always ask your healthcare professional. [place] [address] any warranty or liability for your use of this information. Patient Education        Emily [place] Disease: Exercises  Introduction  Here are some examples of exercises for you to try. The exercises may be suggested for a condition or for rehabilitation. Start each exercise slowly. Ease off the exercises if you start to have pain. You will be told when to start these exercises and which ones will work best for you. How to do the exercises  Thumb lifts   1. Place your hand on a flat surface, with your palm up. 2. Lift your thumb away from your palm to make a \"C\" shape. 3. Hold for about 6 seconds. 4. Repeat 8 to 12 times. Passive thumb MP flexion   1. Hold your hand in front of you, and turn your hand so your little finger faces down and your thumb faces up. (Your hand should be in the position used for shaking someone's hand.) You may also rest your hand on a flat surface. 2. Use the fingers on your other hand to bend your thumb down at the point where your thumb connects to your palm.   3. Hold for at least 15 to 30 seconds. 4. Repeat 2 to 4 times. Finkelstein stretch   1. Hold your arms out in front of you. (Your hand should be in the position used for shaking someone's hand.)  2. Bend your thumb toward your palm. 3. Use your other hand to gently stretch your thumb and wrist downward until you feel the stretch on the thumb side of your wrist.  4. Hold for at least 15 to 30 seconds. 5. Repeat 2 to 4 times. Resisted ulnar deviation   For this exercise, you will need elastic exercise material, such as surgical tubing or Thera-Band. 1. Sit leaning forward with your legs slightly spread and your elbow on your thigh. 2. Grasp one end of the band with your palm down, and step on the other end with the foot opposite the hand holding the band. 3. Slowly bend your wrist sideways and away from your knee. 4. Repeat 8 to 12 times. Follow-up care is a key part of your treatment and safety. Be sure to make and go to all appointments, and call your doctor if you are having problems. It's also a good idea to know your test results and keep a list of the medicines you take. Where can you learn more? Go to https://TrendsetterspeMatrimony.com.[email or web address] org and sign in to your [employer] account. Enter W208 in the [facility] box to learn more about \"De Quervain's Disease: Exercises. \"     If you do not have an account, please click on the \"Sign Up Now\" link. Current as of: March 2, 2020               Content Version: 12.6  © 2006-2020 [employer], Incorporated. Care instructions adapted under license by [facility] ([facility]). If you have questions about a medical condition or this instruction, always ask your healthcare professional. [place] [address] any warranty or liability for your use of this information.

## 2021-01-25 NOTE — PROGRESS NOTES
HPI: Ananth Bullard presents for thumb pain, Covid vaccine, left eyelid induration    Chronic health issues include hypertension, DJD, family history adenomatous polyp, prediabetes. ,  BMI 30,    2021 eyelid erythema tenderness treated with compresses baby shampoo itching which was transiently improved with Augmentin. Washing with warm washcloth. + allergic reaction last pm.  Notes the erythema has improved however has a small nodule present and is concerned. Left wrist concerns. Base of thumb. Pain with extension. No trauma. Left hand right is fine. No elbow involvement. No new activities. Has been onset over the last month. Had a carpal tunnel brace without improvement. Aleve gives her upset stomach however not Motrin.         Foot pain and bunions.  podiatrist.  Difficulty with walking. Good shoes with support. Stretches.  Positive osteoarthritis.  DJD neck and low back doing better.  Does stretching.  Tylenol plus Advil.  Hip discomfort improved with stretching exercises.    History aquatic therapy.      States she was not able to donate blood last time because her hematocrit was too low.  Did have mild leukopenia 6 months ago.  No pica.  No bloody stools.  Amenorrheic. Transient ectopy with increased caffeine intake. Complaints today.          BPQ 1079.  Does not check blood pressures.  No formal exercise other than some walking occasional chair yoga positive stretching. .  Increased exercise intolerance.   A1c 5.2020 Family history early myocardial infarction.  Lisinopril 10 daily.  No lower extremity edema.  Feels like things are going well BMI 30              no abnormal PAP. No FH breast or ovarian cancer. No STD concerns. Rarely sexually active. No STD concerns.  BI-RADS 2 mammogram 2019.  Osteopenia T score -1.8 femoral neck  ..  Adequate vitamin D     DJD neck pain and upper left arm and low back pain. + stretching and improved.  IT stretching.      Colonoscopy .  No polyps.  Recheck in .  Family history adenomatous polyps     Specialists    optho    Physical medicine/rehab    Dermatology    GI    Eye    dentist        PMH:    Ankle fracture     Colonoscopy with polyps               SH   Daughter and 2 babies in [facility] . . 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  Rare alcohol. No tobacco or recreational drugs.         FH 4 sisters    +sister with adenomatous popyl\ DM, MI,     - breast or ovarian cancer, mental illness     Use systems: Ocular migraines less since detention. . No concussions or seizures. Up-to-date with eye exams. Glasses.  Upcoming dermatology appointment.  Up-to-date with dentist. Denies any wheezing pneumonias abnormal chest x-rays. No chest pain persistent palpitations. No breast masses or discharge. Rare GE reflux. Positive colon polyps to recheck Denies any kidney stones. Remote urinary tract infection. No vaginal discharge. Mild dyspareunia. Follows with dermatology. History of basal cell? No depression or anxiety. BMI is up. No knee pain. Knows apnea.   Constitutional, ent, CV, respiratory, GI, , joint, skin, allergic and psychiatric ROS reviewed and negative except for above    Allergies   Allergen Reactions    Sulfa Antibiotics Hives       Outpatient Medications Marked as Taking for the 21 encounter (Office Visit) with Daleen Sicard, MD   Medication Sig Dispense Refill    baclofen (LIORESAL) 10 MG tablet TAKE ONE TABLET BY MOUTH EVERY NIGHT AT BEDTIME AS NEEDED FOR  BACK PAIN 27 tablet 0    melatonin 3 MG TABS tablet Take 3 mg by mouth daily      lisinopril (PRINIVIL;ZESTRIL) 10 MG tablet TAKE ONE TABLET BY MOUTH DAILY FOR BLOOD PRESSURE 90 tablet 1             Past Medical History:   Diagnosis Date    Essential hypertension 2018    History of ankle fracture 2015    Hypertension        Past Surgical History:   Procedure Laterality Date    ANKLE SURGERY Left 5/23/15    ORIF patient      Diagnosis Orders   1. Swelling of left eyelid     2. De Quervain's disease (tenosynovitis)       Persistent eyelid swelling. Nonsteroidal anti-inflammatory no other medication at this time. If persistent will follow up with ophthalmology if worsening she will give me a call. De Quervain's tenosynovitis. Information given. We will get a thumb brace. Follow-up hand if not resolved. No follow-ups on file. Diagnosis and treatment discussed.   Possible side effects of medication reviewed  Patients questions answered  Follow up understood  Pt aware if they are not contacted about any test results , this does not mean they are normal.  They should call

## 2021-03-22 ENCOUNTER — OFFICE VISIT (OUTPATIENT)
Dept: FAMILY MEDICINE CLINIC | Age: 66
End: 2021-03-22
Payer: MEDICARE

## 2021-03-22 VITALS
HEART RATE: 61 BPM | TEMPERATURE: 97.1 F | WEIGHT: 180 LBS | HEIGHT: 64 IN | RESPIRATION RATE: 12 BRPM | DIASTOLIC BLOOD PRESSURE: 80 MMHG | SYSTOLIC BLOOD PRESSURE: 120 MMHG | OXYGEN SATURATION: 99 % | BODY MASS INDEX: 30.73 KG/M2

## 2021-03-22 DIAGNOSIS — I10 ESSENTIAL HYPERTENSION: Primary | ICD-10-CM

## 2021-03-22 DIAGNOSIS — M15.9 PRIMARY OSTEOARTHRITIS INVOLVING MULTIPLE JOINTS: ICD-10-CM

## 2021-03-22 DIAGNOSIS — M65.4 DE QUERVAIN'S TENOSYNOVITIS, LEFT: ICD-10-CM

## 2021-03-22 DIAGNOSIS — Z12.31 ENCOUNTER FOR SCREENING MAMMOGRAM FOR MALIGNANT NEOPLASM OF BREAST: ICD-10-CM

## 2021-03-22 DIAGNOSIS — G43.109 MIGRAINE WITH AURA AND WITHOUT STATUS MIGRAINOSUS, NOT INTRACTABLE: ICD-10-CM

## 2021-03-22 DIAGNOSIS — E78.00 HYPERCHOLESTEROLEMIA: ICD-10-CM

## 2021-03-22 DIAGNOSIS — E66.9 OBESITY, CLASS I, BMI 30.0-34.9 (SEE ACTUAL BMI): ICD-10-CM

## 2021-03-22 DIAGNOSIS — R73.03 PREDIABETES: ICD-10-CM

## 2021-03-22 LAB
A/G RATIO: 1.7 (ref 1.1–2.2)
ALBUMIN SERPL-MCNC: 4.4 G/DL (ref 3.4–5)
ALP BLD-CCNC: 87 U/L (ref 40–129)
ALT SERPL-CCNC: 17 U/L (ref 10–40)
ANION GAP SERPL CALCULATED.3IONS-SCNC: 12 MMOL/L (ref 3–16)
AST SERPL-CCNC: 21 U/L (ref 15–37)
BILIRUB SERPL-MCNC: 0.6 MG/DL (ref 0–1)
BUN BLDV-MCNC: 24 MG/DL (ref 7–20)
CALCIUM SERPL-MCNC: 9.4 MG/DL (ref 8.3–10.6)
CHLORIDE BLD-SCNC: 102 MMOL/L (ref 99–110)
CHOLESTEROL, FASTING: 211 MG/DL (ref 0–199)
CO2: 24 MMOL/L (ref 21–32)
CREAT SERPL-MCNC: 0.6 MG/DL (ref 0.6–1.2)
GFR AFRICAN AMERICAN: >60
GFR NON-AFRICAN AMERICAN: >60
GLOBULIN: 2.6 G/DL
GLUCOSE BLD-MCNC: 92 MG/DL (ref 70–99)
HDLC SERPL-MCNC: 72 MG/DL (ref 40–60)
LDL CHOLESTEROL CALCULATED: 126 MG/DL
POTASSIUM SERPL-SCNC: 4.6 MMOL/L (ref 3.5–5.1)
SODIUM BLD-SCNC: 138 MMOL/L (ref 136–145)
TOTAL PROTEIN: 7 G/DL (ref 6.4–8.2)
TRIGLYCERIDE, FASTING: 66 MG/DL (ref 0–150)
VLDLC SERPL CALC-MCNC: 13 MG/DL

## 2021-03-22 PROCEDURE — G8399 PT W/DXA RESULTS DOCUMENT: HCPCS | Performed by: INTERNAL MEDICINE

## 2021-03-22 PROCEDURE — 4040F PNEUMOC VAC/ADMIN/RCVD: CPT | Performed by: INTERNAL MEDICINE

## 2021-03-22 PROCEDURE — G8417 CALC BMI ABV UP PARAM F/U: HCPCS | Performed by: INTERNAL MEDICINE

## 2021-03-22 PROCEDURE — 99214 OFFICE O/P EST MOD 30 MIN: CPT | Performed by: INTERNAL MEDICINE

## 2021-03-22 PROCEDURE — 4004F PT TOBACCO SCREEN RCVD TLK: CPT | Performed by: INTERNAL MEDICINE

## 2021-03-22 PROCEDURE — G8427 DOCREV CUR MEDS BY ELIG CLIN: HCPCS | Performed by: INTERNAL MEDICINE

## 2021-03-22 PROCEDURE — G8484 FLU IMMUNIZE NO ADMIN: HCPCS | Performed by: INTERNAL MEDICINE

## 2021-03-22 PROCEDURE — 36415 COLL VENOUS BLD VENIPUNCTURE: CPT | Performed by: INTERNAL MEDICINE

## 2021-03-22 PROCEDURE — 3017F COLORECTAL CA SCREEN DOC REV: CPT | Performed by: INTERNAL MEDICINE

## 2021-03-22 PROCEDURE — 1123F ACP DISCUSS/DSCN MKR DOCD: CPT | Performed by: INTERNAL MEDICINE

## 2021-03-22 PROCEDURE — 1090F PRES/ABSN URINE INCON ASSESS: CPT | Performed by: INTERNAL MEDICINE

## 2021-03-22 RX ORDER — MULTIVIT-MIN/IRON/FOLIC ACID/K 18-600-40
CAPSULE ORAL DAILY
COMMUNITY

## 2021-03-22 NOTE — PATIENT INSTRUCTIONS
Continue nice exercise. Low-carb low calorie diet for prediabetes and weight loss. If not improved with continued use of brace most hours of the day follow-up Dr. Yvette Laird orthopedist    Call for your mammogram scheduling in late April. Continue on with your sunscreen if outside. Let me know if you are interested in following up with a gynecologist  Patient Education        Arthritis: Care Instructions  Overview     Arthritis, also called osteoarthritis, is a breakdown of the cartilage that cushions your joints. When the cartilage wears down, your bones rub against each other. This causes pain and stiffness. Many people have some arthritis as they age. Arthritis most often affects the joints of the spine, hands, hips, knees, or feet. Arthritis never goes away completely. But medicine and home treatment can help reduce pain and help you stay active. Follow-up care is a key part of your treatment and safety. Be sure to make and go to all appointments, and call your doctor if you are having problems. It's also a good idea to know your test results and keep a list of the medicines you take. How can you care for yourself at home? · Stay at a healthy weight. Being overweight puts extra strain on your joints. · Talk to your doctor or physical therapist about exercises that will help ease joint pain. ? Stretch. You may enjoy gentle forms of yoga to help keep your joints and muscles flexible. ? Walk instead of jog. Other types of exercise that are less stressful on the joints include riding a bike, swimming, sarbjit chi, or water exercise. ? Lift weights. Strong muscles help reduce stress on your joints. Stronger thigh muscles, for example, take some of the stress off of the knees and hips. Learn the right way to lift weights so you don't make joint pain worse. · Take your medicines exactly as prescribed. Call your doctor if you think you are having a problem with your medicine.   · Take pain medicines exactly as directed. ? If the doctor gave you a prescription medicine for pain, take it as prescribed. ? If you are not taking a prescription pain medicine, ask your doctor if you can take an over-the-counter medicine. · Use a cane, crutch, walker, or another device if you need help to get around. These can help rest your joints. You also can use other things to make life easier, such as a higher toilet seat and padded handles on kitchen utensils. · Do not sit in low chairs. They can make it hard to get up. · Put heat or cold on your sore joints as needed. Use whichever helps you most. You can also switch between hot and cold packs. ? Apply heat 2 or 3 times a day for 20 to 30 minutes--using a heating pad, hot shower, or hot pack--to relieve pain and stiffness. But don't use heat on a swollen joint. ? Put ice or a cold pack on your sore joint for 10 to 20 minutes at a time. Put a thin cloth between the ice and your skin. When should you call for help? Call your doctor now or seek immediate medical care if:    · You have sudden swelling, warmth, or pain in any joint.     · You have joint pain and a fever or rash.     · You have such bad pain that you cannot use a joint. Watch closely for changes in your health, and be sure to contact your doctor if:    · You have mild joint symptoms that continue even with more than 6 weeks of care at home.     · You have stomach pain or other problems with your medicine. Where can you learn more? Go to https://StepUp[employer]. org and sign in to your Mambu account. Enter C221 in the Osmosis box to learn more about \"Arthritis: Care Instructions. \"     If you do not have an account, please click on the \"Sign Up Now\" link. Current as of: August 5, 2020               Content Version: 12.8  © 8728-5561 Healthwise, Incorporated. Care instructions adapted under license by [facility] ([facility]).  If you have questions about a medical condition or this instruction, always ask your healthcare professional. Asiya Machado any warranty or liability for your use of this information. Patient Education        Learning About Low-Carbohydrate Diets  What is a low-carbohydrate diet? A low-carbohydrate (or \"low-carb\") diet limits foods and drinks that have carbohydrates. This includes grains, fruits, milk and yogurt, and starchy vegetables like potatoes, beans, and corn. It also avoids foods and drinks that have added sugar. Instead, low-carb diets include foods that are high in protein and fat. Why might you follow a low-carb diet? Low-carb diets may be used for a variety of reasons, such as for weight loss. People who have diabetes may use a low-carb diet to help manage their blood sugar levels. What should you do before you start the diet? Talk to your doctor before you try any diet. This is even more important if you have health problems like kidney disease, heart disease, or diabetes. Your doctor may suggest that you meet with a registered dietitian. A dietitian can help you make an eating plan that works for you. What foods do you eat on a low-carb diet? On a low-carb diet, you choose foods that are high in protein and fat. Examples of these are:  · Meat, poultry, and fish. · Eggs. · Nuts, such as walnuts, pecans, almonds, and peanuts. · Peanut butter and other nut butters. · Tofu. · Avocado. · Timmy . · Non-starchy vegetables like broccoli, cauliflower, green beans, mushrooms, peppers, lettuce, and spinach. · Unsweetened non-dairy milks like almond milk and coconut milk. · Cheese, cottage cheese, and cream cheese. Current as of: December 17, 2020               Content Version: 12.8  © 2006-2021 Healthwise, Incorporated. Care instructions adapted under license by [facility] ([facility]).  If you have questions about a medical condition or this instruction, always ask your healthcare professional. Paco Stewart disclaims any warranty or liability for your use of this information.

## 2021-03-22 NOTE — PROGRESS NOTES
HPI: Karen Sandoval presents to prevent synovitis follow-up. Chronic health issues include hypertension, DJD, family history adenomatous polyp, prediabetes. ,  BMI 30,    Transient eyelid edema it from  resolved with medication. No recurrence.     De Quervain's tenosynovitis greater than 1 month. Used it nonsteroidal on a as needed basis. Is having a spica splint. Has improved but still present. Wishes to follow-up with Ortho if does not improve further. Intermittent nonsteroidal anti-inflammatory use. No GI upset. Activity makes it worse. Rest makes it better. Positive bunion. Insoles with podiatry. Low back exercises for DJD. History aquatic therapy. Daily stretching. Uses Tylenol and Advil on a as needed basis     Hx anemia leukopenia.         BBO 9317.  Wrist cuff 387 systolic. Ectopy with caffeine no formal exercise other than some walking occasional chair yoga positive stretching.   A1c 5.2020 Family history early myocardial infarction.  Lisinopril 10 daily. With elevation lipids.  No lower extremity edema.  Feels like things are going well BMI 30              no abnormal PAP. No FH breast or ovarian cancer. No STD concerns. Rarely sexually active. No STD concerns.  BI-RADS 2 mammogram 2019.  Osteopenia T score -1.8 femoral neck .  Adequate vitamin D. Atrophic vaginitis and vaginal varicosities. Has seen GYN in the past.     DJD neck pain and upper left arm and low back pain. + stretching and improved. IT stretching.       [id number] polyp recheck . Elder  history adenomatous polyps     Specialists    optho    Physical medicine/rehab    Dermatology    GI    Eye    dentist        PMH:    Ankle fracture     Colonoscopy with polyps               SH   Daughter and 2 babies [place]. . Medical tech hx retired .  Exercises 3-4 times weekly goes for walks as well.  Rare alcohol. No tobacco or recreational drugs. floor exercise and stretch 4 times weekly.          FH 4 sisters    +sister with adenomatous popyl\ DM, MI,     - breast or ovarian cancer, mental illness     ROS: Ocular migraines less since correction. . No concussions or seizures. Up-to-date with eye exams. Glasses.  Follows with dermatology. .  Up-to-date with dentist. Denies any wheezing pneumonias abnormal chest x-rays. No chest pain persistent palpitations. No breast masses or discharge. Upcoming mammogram.  Rare GE reflux. Positive colon polyps recheck 2026 denies any kidney stones. Remote urinary tract infection. No vaginal discharge. Mild dyspareunia. History of basal cell? No depression or anxiety. BMI is up. No knee pain. No apnea. Constitutional, ent, CV, respiratory, GI, , joint, skin, allergic and psychiatric ROS reviewed and negative except for above    Allergies   Allergen Reactions    Sulfa Antibiotics Hives       Outpatient Medications Marked as Taking for the 3/22/21 encounter (Office Visit) with Alvina Melendez MD   Medication Sig Dispense Refill    Cholecalciferol (VITAMIN D) 50 MCG (2000 UT) CAPS capsule Take by mouth daily      baclofen (LIORESAL) 10 MG tablet TAKE ONE TABLET BY MOUTH EVERY NIGHT AT BEDTIME AS NEEDED FOR  BACK PAIN 27 tablet 0    Acetaminophen (TYLENOL PO) Take by mouth as needed      melatonin 3 MG TABS tablet Take 3 mg by mouth daily      lisinopril (PRINIVIL;ZESTRIL) 10 MG tablet TAKE ONE TABLET BY MOUTH DAILY FOR BLOOD PRESSURE 90 tablet 1    IBUPROFEN PO Take by mouth               Past Medical History:   Diagnosis Date    Essential hypertension 8/20/2018    History of ankle fracture 5/23/2015    Hypertension        Past Surgical History:   Procedure Laterality Date    ANKLE SURGERY Left 5/23/15    ORIF Left ankle fracture dislocation.              Objective     /80   Pulse 61   Temp 97.1 °F (36.2 °C)   Resp 12   Ht 5' 4\" (1.626 m)   Wt 180 lb (81.6 kg)   LMP 05/23/2005 (Approximate)   SpO2 99% Comment: TETE BMI 30.90 kg/m²     @LASTSAO2(3)@    Wt Readings from Last 3 Encounters:   01/25/21 179 lb (81.2 kg)   09/28/20 176 lb (79.8 kg)   03/04/20 177 lb (80.3 kg)       Physical Exam     NAD alert and cooperative  HEENT: Throat is clear. TMs unremarkable. No proptosis. No edema of the eyelids. Lungs are clear. Good NAKIA ratio without any wheezes rales or rhonchi. Breast without any dominant masses discharge or dimpling. Cardiovascular exam regular rate and rhythm without any murmur click. Mild obesity no hepatosplenomegaly. Good range of motion of the hips. Crepitus of the knees. Good pulses lower extremity no peripheral edema. No suspicious skin lesions or nodules. Pain base of the wrist.        Chemistry        Component Value Date/Time     09/28/2020 1046    K 4.3 09/28/2020 1046     09/28/2020 1046    CO2 26 09/28/2020 1046    BUN 15 09/28/2020 1046    CREATININE 0.6 09/28/2020 1046        Component Value Date/Time    CALCIUM 9.7 09/28/2020 1046    ALKPHOS 78 03/04/2020 1124    AST 21 03/04/2020 1124    ALT 21 03/04/2020 1124    BILITOT 0.5 03/04/2020 1124            Lab Results   Component Value Date    WBC 3.9 (L) 09/28/2020    HGB 12.4 09/28/2020    HCT 37.8 09/28/2020    MCV 87.7 09/28/2020     09/28/2020     Lab Results   Component Value Date    LABA1C 5.7 03/04/2020     Lab Results   Component Value Date    .9 03/04/2020     Lab Results   Component Value Date    LABA1C 5.7 03/04/2020     No components found for: CHLPL  Lab Results   Component Value Date    TRIG 69 09/28/2020    TRIG 59 08/22/2018     Lab Results   Component Value Date    HDL 75 (H) 09/28/2020    HDL 83 (A) 08/22/2018     Lab Results   Component Value Date    LDLCALC 131 (H) 09/28/2020    LDLCALC 134 08/22/2018     Lab Results   Component Value Date    LABVLDL 14 09/28/2020       Old labs and records reviewed or requested  Discussed past lab and studies with patient    Diagnosis Orders   1.  Essential hypertension  Comprehensive Metabolic Panel   2. Hypercholesterolemia  Lipid, Fasting   3. Migraine with aura and without status migrainosus, not intractable     4. Obesity, Class I, BMI 30.0-34.9 (see actual BMI)     5. Primary osteoarthritis involving multiple joints     6. De Quervain's tenosynovitis, left  Kitty Rain MD, Hand Surgery (Hand, Wrist, Upper Extremity), [facility]   7. Encounter for screening mammogram for malignant neoplasm of breast  ZANE DIGITAL SCREEN W OR WO CAD BILATERAL   8. Prediabetes  Hemoglobin A1C     Hypertension well-controlled basic metabolic profile. Hyperlipidemia. Discussed low-cholesterol diet weight loss exercise. Obesity. Discussed decreasing calories. De Quervain's tenosynovitis referral Ortho if she wishes to follow through. Due mammogram after April 2021. Order received. Prediabetes. A1c      No follow-ups on file. Diagnosis and treatment discussed.   Possible side effects of medication reviewed  Patients questions answered  Follow up understood  Pt aware if they are not contacted about any test results , this does not mean they are normal.  They should call

## 2021-03-23 LAB
ESTIMATED AVERAGE GLUCOSE: 122.6 MG/DL
HBA1C MFR BLD: 5.9 %

## 2021-05-03 PROBLEM — Z12.31 ENCOUNTER FOR SCREENING MAMMOGRAM FOR MALIGNANT NEOPLASM OF BREAST: Status: ACTIVE | Noted: 2018-08-20

## 2021-06-02 PROBLEM — Z12.31 ENCOUNTER FOR SCREENING MAMMOGRAM FOR MALIGNANT NEOPLASM OF BREAST: Status: RESOLVED | Noted: 2018-08-20 | Resolved: 2021-06-02

## 2021-09-12 DIAGNOSIS — I10 ESSENTIAL HYPERTENSION: ICD-10-CM

## 2021-09-14 RX ORDER — LISINOPRIL 10 MG/1
TABLET ORAL
Qty: 90 TABLET | Refills: 0 | Status: SHIPPED | OUTPATIENT
Start: 2021-09-14 | End: 2021-12-16 | Stop reason: SDUPTHER

## 2021-12-16 DIAGNOSIS — I10 ESSENTIAL HYPERTENSION: ICD-10-CM

## 2021-12-17 RX ORDER — LISINOPRIL 10 MG/1
TABLET ORAL
Qty: 90 TABLET | Refills: 0 | Status: SHIPPED | OUTPATIENT
Start: 2021-12-17 | End: 2022-03-14

## 2022-03-08 ENCOUNTER — OFFICE VISIT (OUTPATIENT)
Dept: FAMILY MEDICINE CLINIC | Age: 67
End: 2022-03-08
Payer: MEDICARE

## 2022-03-08 VITALS
SYSTOLIC BLOOD PRESSURE: 129 MMHG | DIASTOLIC BLOOD PRESSURE: 81 MMHG | WEIGHT: 168 LBS | BODY MASS INDEX: 28.68 KG/M2 | HEIGHT: 64 IN | RESPIRATION RATE: 12 BRPM | OXYGEN SATURATION: 100 % | HEART RATE: 62 BPM

## 2022-03-08 DIAGNOSIS — I10 ESSENTIAL HYPERTENSION: Primary | ICD-10-CM

## 2022-03-08 DIAGNOSIS — M47.9 OSTEOARTHRITIS OF SPINE, UNSPECIFIED SPINAL OSTEOARTHRITIS COMPLICATION STATUS, UNSPECIFIED SPINAL REGION: ICD-10-CM

## 2022-03-08 DIAGNOSIS — M85.80 OSTEOPENIA, UNSPECIFIED LOCATION: ICD-10-CM

## 2022-03-08 DIAGNOSIS — E78.00 HYPERCHOLESTEROLEMIA: ICD-10-CM

## 2022-03-08 DIAGNOSIS — Z86.010 HISTORY OF COLONIC POLYPS: ICD-10-CM

## 2022-03-08 DIAGNOSIS — R73.03 PREDIABETES: ICD-10-CM

## 2022-03-08 DIAGNOSIS — T69.1XXD CHILBLAINS, SUBSEQUENT ENCOUNTER: ICD-10-CM

## 2022-03-08 PROBLEM — E66.3 OVERWEIGHT (BMI 25.0-29.9): Status: RESOLVED | Noted: 2018-08-20 | Resolved: 2022-03-08

## 2022-03-08 PROBLEM — Z86.0100 HISTORY OF COLONIC POLYPS: Status: ACTIVE | Noted: 2020-10-23

## 2022-03-08 PROBLEM — E66.3 OVERWEIGHT (BMI 25.0-29.9): Status: ACTIVE | Noted: 2018-08-20

## 2022-03-08 LAB
A/G RATIO: 2.2 (ref 1.1–2.2)
ALBUMIN SERPL-MCNC: 4.9 G/DL (ref 3.4–5)
ALP BLD-CCNC: 91 U/L (ref 40–129)
ALT SERPL-CCNC: 20 U/L (ref 10–40)
ANION GAP SERPL CALCULATED.3IONS-SCNC: 17 MMOL/L (ref 3–16)
AST SERPL-CCNC: 19 U/L (ref 15–37)
BASOPHILS ABSOLUTE: 0 K/UL (ref 0–0.2)
BASOPHILS RELATIVE PERCENT: 1.1 %
BILIRUB SERPL-MCNC: 0.6 MG/DL (ref 0–1)
BUN BLDV-MCNC: 12 MG/DL (ref 7–20)
CALCIUM SERPL-MCNC: 9.9 MG/DL (ref 8.3–10.6)
CHLORIDE BLD-SCNC: 98 MMOL/L (ref 99–110)
CHOLESTEROL, TOTAL: 210 MG/DL (ref 0–199)
CO2: 22 MMOL/L (ref 21–32)
CREAT SERPL-MCNC: 0.7 MG/DL (ref 0.6–1.2)
EOSINOPHILS ABSOLUTE: 0.1 K/UL (ref 0–0.6)
EOSINOPHILS RELATIVE PERCENT: 2.9 %
GFR AFRICAN AMERICAN: >60
GFR NON-AFRICAN AMERICAN: >60
GLUCOSE BLD-MCNC: 93 MG/DL (ref 70–99)
HBA1C MFR BLD: 5.3 %
HCT VFR BLD CALC: 37.1 % (ref 36–48)
HDLC SERPL-MCNC: 73 MG/DL (ref 40–60)
HEMOGLOBIN: 12.4 G/DL (ref 12–16)
LDL CHOLESTEROL CALCULATED: 125 MG/DL
LYMPHOCYTES ABSOLUTE: 1.7 K/UL (ref 1–5.1)
LYMPHOCYTES RELATIVE PERCENT: 48 %
MCH RBC QN AUTO: 29.3 PG (ref 26–34)
MCHC RBC AUTO-ENTMCNC: 33.6 G/DL (ref 31–36)
MCV RBC AUTO: 87.2 FL (ref 80–100)
MONOCYTES ABSOLUTE: 0.3 K/UL (ref 0–1.3)
MONOCYTES RELATIVE PERCENT: 8.5 %
NEUTROPHILS ABSOLUTE: 1.4 K/UL (ref 1.7–7.7)
NEUTROPHILS RELATIVE PERCENT: 39.5 %
PDW BLD-RTO: 14 % (ref 12.4–15.4)
PLATELET # BLD: 244 K/UL (ref 135–450)
PMV BLD AUTO: 8 FL (ref 5–10.5)
POTASSIUM SERPL-SCNC: 4.4 MMOL/L (ref 3.5–5.1)
RBC # BLD: 4.26 M/UL (ref 4–5.2)
SODIUM BLD-SCNC: 137 MMOL/L (ref 136–145)
TOTAL PROTEIN: 7.1 G/DL (ref 6.4–8.2)
TRIGL SERPL-MCNC: 61 MG/DL (ref 0–150)
VLDLC SERPL CALC-MCNC: 12 MG/DL
WBC # BLD: 3.5 K/UL (ref 4–11)

## 2022-03-08 PROCEDURE — G8399 PT W/DXA RESULTS DOCUMENT: HCPCS | Performed by: INTERNAL MEDICINE

## 2022-03-08 PROCEDURE — 83036 HEMOGLOBIN GLYCOSYLATED A1C: CPT | Performed by: INTERNAL MEDICINE

## 2022-03-08 PROCEDURE — G8484 FLU IMMUNIZE NO ADMIN: HCPCS | Performed by: INTERNAL MEDICINE

## 2022-03-08 PROCEDURE — G8427 DOCREV CUR MEDS BY ELIG CLIN: HCPCS | Performed by: INTERNAL MEDICINE

## 2022-03-08 PROCEDURE — 4004F PT TOBACCO SCREEN RCVD TLK: CPT | Performed by: INTERNAL MEDICINE

## 2022-03-08 PROCEDURE — 36415 COLL VENOUS BLD VENIPUNCTURE: CPT | Performed by: INTERNAL MEDICINE

## 2022-03-08 PROCEDURE — 3017F COLORECTAL CA SCREEN DOC REV: CPT | Performed by: INTERNAL MEDICINE

## 2022-03-08 PROCEDURE — 1090F PRES/ABSN URINE INCON ASSESS: CPT | Performed by: INTERNAL MEDICINE

## 2022-03-08 PROCEDURE — 1123F ACP DISCUSS/DSCN MKR DOCD: CPT | Performed by: INTERNAL MEDICINE

## 2022-03-08 PROCEDURE — 99214 OFFICE O/P EST MOD 30 MIN: CPT | Performed by: INTERNAL MEDICINE

## 2022-03-08 PROCEDURE — 4040F PNEUMOC VAC/ADMIN/RCVD: CPT | Performed by: INTERNAL MEDICINE

## 2022-03-08 PROCEDURE — G8417 CALC BMI ABV UP PARAM F/U: HCPCS | Performed by: INTERNAL MEDICINE

## 2022-03-08 SDOH — ECONOMIC STABILITY: FOOD INSECURITY: WITHIN THE PAST 12 MONTHS, THE FOOD YOU BOUGHT JUST DIDN'T LAST AND YOU DIDN'T HAVE MONEY TO GET MORE.: NEVER TRUE

## 2022-03-08 SDOH — ECONOMIC STABILITY: FOOD INSECURITY: WITHIN THE PAST 12 MONTHS, YOU WORRIED THAT YOUR FOOD WOULD RUN OUT BEFORE YOU GOT MONEY TO BUY MORE.: NEVER TRUE

## 2022-03-08 ASSESSMENT — PATIENT HEALTH QUESTIONNAIRE - PHQ9
SUM OF ALL RESPONSES TO PHQ QUESTIONS 1-9: 0
SUM OF ALL RESPONSES TO PHQ9 QUESTIONS 1 & 2: 0
2. FEELING DOWN, DEPRESSED OR HOPELESS: 0
1. LITTLE INTEREST OR PLEASURE IN DOING THINGS: 0
SUM OF ALL RESPONSES TO PHQ QUESTIONS 1-9: 0

## 2022-03-08 ASSESSMENT — SOCIAL DETERMINANTS OF HEALTH (SDOH): HOW HARD IS IT FOR YOU TO PAY FOR THE VERY BASICS LIKE FOOD, HOUSING, MEDICAL CARE, AND HEATING?: NOT HARD AT ALL

## 2022-03-08 NOTE — PATIENT INSTRUCTIONS
Nice weight loss and exercise. Schedule your mammogram as you to discuss. You should get your laboratories within the next week. We should recheck your bone density sometime within the next 2 years. Try MiraLAX in the morning when you get some constipation. I would in closed information about preventing osteoporosis. Patient Education        Well Visit, Over 72: Care Instructions  Overview     Well visits can help you stay healthy. Your doctor has checked your overall health and may have suggested ways to take good care of yourself. Your doctor also may have recommended tests. At home, you can help prevent illness with healthy eating, regular exercise, and other steps. Follow-up care is a key part of your treatment and safety. Be sure to make and go to all appointments, and call your doctor if you are having problems. It's also a good idea to know your test results and keep a list of the medicines you take. How can you care for yourself at home? · Get screening tests that you and your doctor decide on. Screening helps find diseases before any symptoms appear. · Eat healthy foods. Choose fruits, vegetables, whole grains, protein, and low-fat dairy foods. Limit fat, especially saturated fat. Reduce salt in your diet. · Limit alcohol. If you are a man, have no more than 2 drinks a day or 14 drinks a week. If you are a woman, have no more than 1 drink a day or 7 drinks a week. Since alcohol affects older adults differently, you may want to limit alcohol even more. Or you may not want to drink at all. · Get at least 30 minutes of exercise on most days of the week. Walking is a good choice. You also may want to do other activities, such as running, swimming, cycling, or playing tennis or team sports. · Reach and stay at a healthy weight. This will lower your risk for many problems, such as obesity, diabetes, heart disease, and high blood pressure. · Do not smoke.  Smoking can make health problems worse. If you need help quitting, talk to your doctor about stop-smoking programs and medicines. These can increase your chances of quitting for good. · Care for your mental health. It is easy to get weighed down by worry and stress. Learn strategies to manage stress, like deep breathing and mindfulness, and stay connected with your family and community. If you find you often feel sad or hopeless, talk with your doctor. Treatment can help. · Talk to your doctor about whether you have any risk factors for sexually transmitted infections (STIs). You can help prevent STIs if you wait to have sex with a new partner (or partners) until you've each been tested for STIs. It also helps if you use condoms (male or female condoms) and if you limit your sex partners to one person who only has sex with you. Vaccines are available for some STIs. · If you think you may have a problem with alcohol or drug use, talk to your doctor. This includes prescription medicines (such as amphetamines and opioids) and illegal drugs (such as cocaine and methamphetamine). Your doctor can help you figure out what type of treatment is best for you. · Protect your skin from too much sun. When you're outdoors from 10 a.m. to 4 p.m., stay in the shade or cover up with clothing and a hat with a wide brim. Wear sunglasses that block UV rays. Even when it's cloudy, put broad-spectrum sunscreen (SPF 30 or higher) on any exposed skin. · See a dentist one or two times a year for checkups and to have your teeth cleaned. · Wear a seat belt in the car. When should you call for help? Watch closely for changes in your health, and be sure to contact your doctor if you have any problems or symptoms that concern you. Where can you learn more? Go to https://rick.health-partners. org and sign in to your [employer] account. Enter P370 in the OrderBorder box to learn more about \"Well Visit, Over 65: Care Instructions. \"     If you do not have an account, please click on the \"Sign Up Now\" link. Current as of: October 6, 2021               Content Version: 13.1  © 2006-2021 [employer]. Care instructions adapted under license by [facility] ([facility]). If you have questions about a medical condition or this instruction, always ask your healthcare professional. [address] any warranty or liability for your use of this information. Patient Education        Preventing Osteoporosis: Care Instructions  Your Care Instructions     Osteoporosis means the bones are weak and thin enough that they can break easily. The older you are, the more likely you are to get osteoporosis. But with plenty of calcium, vitamin D, and exercise, you can help prevent osteoporosis. The preteen and teen years are a key time for bone building. With the help of calcium, vitamin D, and exercise in those early years and beyond, the bones reach their peak density and strength by age 27. After age 27, your bones naturally start to thin and weaken. The stronger your bones are at around age 27, the lower your risk for osteoporosis. But no matter what your age and risk are, your bones still need calcium, vitamin D, and exercise to stay strong. Also avoid smoking, and limit alcohol. Smoking and heavy alcohol use can make your bones thinner. Talk to your doctor about any special risks you might have, such as having a close relative with osteoporosis or taking a medicine that can weaken bones. Your doctor can tell you the best ways to protect your bones from thinning. Follow-up care is a key part of your treatment and safety. Be sure to make and go to all appointments, and call your doctor if you are having problems. It's also a good idea to know your test results and keep a list of the medicines you take. How can you care for yourself at home? Here are some things you can do to build and strengthen your bones:  · Get enough calcium and vitamin D.  ?  Eat foods rich in calcium, like yogurt, cheese, milk, and dark green vegetables. ? Eat foods rich in vitamin D, like eggs, fatty fish, cereal, and fortified milk. ? Get some sunshine. Your body uses sunshine to make its own vitamin D.  ? Talk to your doctor about taking a calcium plus vitamin D supplement if you don't think you are getting enough through your diet and sunshine. · Get regular bone-building exercise. Walking, jogging, dancing, and lifting weights can make your bones stronger. · Limit alcohol to 2 drinks a day for men and 1 drink a day for women. · Don't smoke. Smoking can make bones thin faster. If you need help quitting, talk to your doctor about stop-smoking programs and medicines. These can increase your chances of quitting for good. When should you call for help? Watch closely for changes in your health, and be sure to contact your doctor if you have any problems. Where can you learn more? Go to https://Hua Kangreny.Cuffed and Wanted. org and sign in to your [employer]oTron[employer] account. Enter X738 in the [facility] box to learn more about \"Preventing Osteoporosis: Care Instructions. \"     If you do not have an account, please click on the \"Sign Up Now\" link. Current as of: September 8, 2021               Content Version: 13.1  © 0954-7953 Healthwise, Incorporated. Care instructions adapted under license by [facility] ([facility]). If you have questions about a medical condition or this instruction, always ask your healthcare professional. [place] [address] any warranty or liability for your use of this information.

## 2022-03-08 NOTE — PROGRESS NOTES
HPI: Leona Pantoja presents for follow-up      Chronic health issues include hypertension, DJD, family history adenomatous polyp, prediabetes. ,  ,     Transient eyelid edema it from  resolved with medication. No recurrence.     De Quervain's tenosynovitis. DJD back, neck, bunion. Tylenol, advil. achilles tendonitis Street IT syndrome. Cortisone injections. Follows with podiatry and orthopedist.  Possible toe surgery in the future. Sores hand and toes usually with with cold. Dr garcia noticed with pernio diagnosis. No known autoimmune disease negative hepatitis C. Up-to-date on cancer screening. No family history of autoimmune disease. Occurs mostly in cold weather.     Hx anemia leukopenia. No recurrent infections. HTN contoled with lisinopril. + yoga, walking, FH early MI. ldl 130's ASCVD 8.6. + BP cuff. Control blood pressure. Significant weight loss prior prediabetes. Silver sneakers and weight watchers     vaginal deliveries BI-RADS 2 mammogram 2021. Bone density 2019 T score -1.8 femoral neck. Positive vitamin D. Frequent cortisone injections. No vaginal bleeding.       [id number] polyp recheck . .  Family history adenomatous polyps. + constipation. psyllium q pm and bene fiber in am.      Specialists    optho    Physical medicine/rehab    Dermatology    GI    Eye    dentist    Ortho/podiatrist        PMH:    Ankle fracture     Colonoscopy with polyps              SH   Daughter and 2 babies [place]. . Medical tech hx retired .  Exercises 3-4 times weekly goes for walks as well.  Rare alcohol. No tobacco or recreational drugs. floor exercise and stretch 4 times weekly. [facility] [employer] [employer] [facility] spring 2022        [facility] 4 sisters    +sister with adenomatous popyl\ DM, MI,     - breast or ovarian cancer, mental illness     ROS: Ocular migraines less since long term. . No concussions or seizures. Up-to-date with eye exams.  Glasses.  Follows with dermatology. .  Up-to-date with dentist. Denies any wheezing pneumonias abnormal chest x-rays. No chest pain persistent palpitations. No breast masses or discharge. Upcoming mammogram.  Rare GE reflux. Positive colon polyps recheck 2026 denies any kidney stones. Remote urinary tract infection. No vaginal discharge. Mild dyspareunia. History of basal cell? No depression or anxiety. BMI is up. No knee pain. No apnea. Constitutional, ent, CV, respiratory, GI, , joint, skin, allergic and psychiatric ROS reviewed and negative except for above    Allergies   Allergen Reactions    Sulfa Antibiotics Hives       Outpatient Medications Marked as Taking for the 3/8/22 encounter (Office Visit) with Logan Zapata MD   Medication Sig Dispense Refill    lisinopril (PRINIVIL;ZESTRIL) 10 MG tablet TAKE ONE TABLET BY MOUTH DAILY FOR BLOOD PRESSURE due follow up march 90 tablet 0    baclofen (LIORESAL) 10 MG tablet TAKE ONE TABLET BY MOUTH EVERY NIGHT AT BEDTIME AS NEEDED FOR BACK PAIN 30 tablet 2    Cholecalciferol (VITAMIN D) 50 MCG (2000 UT) CAPS capsule Take by mouth daily      melatonin 3 MG TABS tablet Take 3 mg by mouth daily      IBUPROFEN PO Take by mouth               Past Medical History:   Diagnosis Date    Essential hypertension 8/20/2018    History of ankle fracture 5/23/2015    Hypertension        Past Surgical History:   Procedure Laterality Date    ANKLE SURGERY Left 5/23/15    ORIF Left ankle fracture dislocation. Objective     /81   Pulse 62   Resp 12   Ht 5' 4\" (1.626 m)   Wt 168 lb (76.2 kg)   LMP 05/23/2005 (Approximate)   SpO2 100% Comment: RA  BMI 28.84 kg/m²     @LASTSAO2(3)@    Wt Readings from Last 3 Encounters:   03/22/21 180 lb (81.6 kg)   01/25/21 179 lb (81.2 kg)   09/28/20 176 lb (79.8 kg)       Physical Exam     NAD alert and cooperative  HEENT: Is clear. Good dentition. TMs unremarkable. Pink conjunctive a. Good upstroke of the carotids.   No adenopathy. Lungs are clear good NAKIA ratio without any wheezes rales or rhonchi. Cardiovascular exam regular rate and rhythm no murmur click. Breast without any dominant masses discharge or dimpling. Abdomen benign no hepatosplenomegaly epigastric tenderness or mass. Good pulses feet. No maceration. No suspicious skin lesions or nodules. She is well-groomed good eye contact. Chemistry        Component Value Date/Time     03/22/2021 0946    K 4.6 03/22/2021 0946     03/22/2021 0946    CO2 24 03/22/2021 0946    BUN 24 (H) 03/22/2021 0946    CREATININE 0.6 03/22/2021 0946        Component Value Date/Time    CALCIUM 9.4 03/22/2021 0946    ALKPHOS 87 03/22/2021 0946    AST 21 03/22/2021 0946    ALT 17 03/22/2021 0946    BILITOT 0.6 03/22/2021 0946            Lab Results   Component Value Date    WBC 3.9 (L) 09/28/2020    HGB 12.4 09/28/2020    HCT 37.8 09/28/2020    MCV 87.7 09/28/2020     09/28/2020     Lab Results   Component Value Date    LABA1C 5.9 03/22/2021     Lab Results   Component Value Date    .6 03/22/2021     Lab Results   Component Value Date    LABA1C 5.9 03/22/2021     No components found for: CHLPL  Lab Results   Component Value Date    TRIG 69 09/28/2020    TRIG 59 08/22/2018     Lab Results   Component Value Date    HDL 72 (H) 03/22/2021    HDL 75 (H) 09/28/2020    HDL 83 (A) 08/22/2018     Lab Results   Component Value Date    LDLCALC 126 (H) 03/22/2021    LDLCALC 131 (H) 09/28/2020    LDLCALC 134 08/22/2018     Lab Results   Component Value Date    LABVLDL 13 03/22/2021    LABVLDL 14 09/28/2020       Old labs and records reviewed or requested  Discussed past lab and studies with patient      Diagnosis Orders   1. Essential hypertension  Comprehensive Metabolic Panel   2. Prediabetes  POCT glycosylated hemoglobin (Hb A1C)   3. History of colonic polyps     4. Hypercholesterolemia  Lipid Panel   5.  Migraine with aura and without status migrainosus, not intractable 6. Osteopenia, unspecified location     7. Osteoarthritis of spine, unspecified spinal osteoarthritis complication status, unspecified spinal region     8. Chilblains, subsequent encounter  CBC with Auto Differential     Pretension well controlled continue current medications. A1c of 5.3. Excellent weight loss exercise    History of colonic polyps. Some constipation may use MiraLAX. Hyperlipidemia lipid profile. Osteopenia. Information on preventing osteoporosis. Repeat bone density sometime in the next couple of years. Graph osteoarthritis. Tendinitis. Pernio we will recheck her CBC auto differential  On this date I have spent 30 minutes reviewing previous notes, test results, and face to face with the patient discussing the diagnosis and plan          Return in about 1 year (around 3/8/2023). Diagnosis and treatment discussed.   Possible side effects of medication reviewed  Patients questions answered  Follow up understood  Pt aware if they are not contacted about any test results , this does not mean they are normal.  They should call

## 2022-03-09 PROBLEM — T69.1XXA PERNIO: Status: ACTIVE | Noted: 2022-03-09

## 2022-03-09 PROBLEM — D72.819 LEUKOPENIA: Status: ACTIVE | Noted: 2022-03-09

## 2022-03-12 RX ORDER — PRAVASTATIN SODIUM 20 MG
20 TABLET ORAL EVERY EVENING
Qty: 30 TABLET | Refills: 2 | Status: SHIPPED | OUTPATIENT
Start: 2022-03-12 | End: 2022-06-13

## 2022-03-13 DIAGNOSIS — I10 ESSENTIAL HYPERTENSION: ICD-10-CM

## 2022-03-14 RX ORDER — LISINOPRIL 10 MG/1
TABLET ORAL
Qty: 90 TABLET | Refills: 3 | Status: SHIPPED | OUTPATIENT
Start: 2022-03-14

## 2022-06-01 ENCOUNTER — TELEPHONE (OUTPATIENT)
Dept: FAMILY MEDICINE CLINIC | Age: 67
End: 2022-06-01

## 2022-06-01 DIAGNOSIS — D72.819 LEUKOPENIA, UNSPECIFIED TYPE: ICD-10-CM

## 2022-06-01 DIAGNOSIS — E78.2 MIXED HYPERLIPIDEMIA: Primary | ICD-10-CM

## 2022-06-01 PROBLEM — Z86.16 HISTORY OF 2019 NOVEL CORONAVIRUS DISEASE (COVID-19): Status: ACTIVE | Noted: 2022-06-01

## 2022-06-01 NOTE — TELEPHONE ENCOUNTER
----- Message from Abdoul Marie sent at 6/1/2022  9:37 AM EDT -----  Subject: Message to Provider    QUESTIONS  Information for Provider? The patient stated that she traveled [place]   and tested positive for covid on 5/13 and had a negative test on 5/24. The   patient stated that she had mild symptoms. The patient wanted to let her   PCP know as well as see if they need her to retest or to be seen. The   patient would like a call back. ---------------------------------------------------------------------------  --------------  [employer] INFO  What is the best way for the office to contact you? OK to leave message on   voicemail  Preferred Call Back Phone Number? [phone]  ---------------------------------------------------------------------------  --------------  SCRIPT ANSWERS  Relationship to Patient?  Self

## 2022-06-01 NOTE — TELEPHONE ENCOUNTER
----- Message from Nicolas Reis sent at 6/1/2022  9:33 AM EDT -----  Subject: Referral Request    QUESTIONS   Reason for referral request? Bloodwork to check white blood cell count,   recheck after starting medication pravastatin, Any other labs that the PCP   will need. Has the physician seen you for this condition before? Yes  Select a date? 2022-03-08  Select the Provider the patient wants to be referred to, if known (PCP or   Specialist)? Outside Physician - n/a   Preferred Specialist (if applicable)? Do you already have an appointment scheduled? No  Additional Information for Provider? The patient would like a call back to   schedule the labs once her PCP orders them.   ---------------------------------------------------------------------------  --------------  CALL BACK INFO  What is the best way for the office to contact you? OK to leave message on   voicemail  Preferred Call Back Phone Number? [phone]  ---------------------------------------------------------------------------  --------------  SCRIPT ANSWERS  Relationship to Patient?  Self

## 2022-06-01 NOTE — TELEPHONE ENCOUNTER
Glad she is doing well. No need to retest covid    I have ordered lab.  Mariangel if fasting    Set up apt

## 2022-06-10 ENCOUNTER — NURSE ONLY (OUTPATIENT)
Dept: FAMILY MEDICINE CLINIC | Age: 67
End: 2022-06-10
Payer: MEDICARE

## 2022-06-10 DIAGNOSIS — E78.2 MIXED HYPERLIPIDEMIA: ICD-10-CM

## 2022-06-10 DIAGNOSIS — D72.819 LEUKOPENIA, UNSPECIFIED TYPE: ICD-10-CM

## 2022-06-10 LAB
ALBUMIN SERPL-MCNC: 4.9 G/DL (ref 3.4–5)
ALP BLD-CCNC: 87 U/L (ref 40–129)
ALT SERPL-CCNC: 14 U/L (ref 10–40)
AST SERPL-CCNC: 18 U/L (ref 15–37)
BILIRUB SERPL-MCNC: 0.6 MG/DL (ref 0–1)
BILIRUBIN DIRECT: <0.2 MG/DL (ref 0–0.3)
BILIRUBIN, INDIRECT: NORMAL MG/DL (ref 0–1)
CHOLESTEROL, FASTING: 183 MG/DL (ref 0–199)
HCT VFR BLD CALC: 35.5 % (ref 36–48)
HDLC SERPL-MCNC: 76 MG/DL (ref 40–60)
HEMOGLOBIN: 11.7 G/DL (ref 12–16)
LDL CHOLESTEROL CALCULATED: 99 MG/DL
MCH RBC QN AUTO: 29.4 PG (ref 26–34)
MCHC RBC AUTO-ENTMCNC: 33.1 G/DL (ref 31–36)
MCV RBC AUTO: 88.8 FL (ref 80–100)
PDW BLD-RTO: 16.2 % (ref 12.4–15.4)
PLATELET # BLD: 247 K/UL (ref 135–450)
PMV BLD AUTO: 7.7 FL (ref 5–10.5)
RBC # BLD: 3.99 M/UL (ref 4–5.2)
TOTAL PROTEIN: 6.9 G/DL (ref 6.4–8.2)
TRIGLYCERIDE, FASTING: 41 MG/DL (ref 0–150)
VLDLC SERPL CALC-MCNC: 8 MG/DL
WBC # BLD: 6.5 K/UL (ref 4–11)

## 2022-06-10 PROCEDURE — 36415 COLL VENOUS BLD VENIPUNCTURE: CPT | Performed by: INTERNAL MEDICINE

## 2022-06-13 DIAGNOSIS — D64.9 ANEMIA, UNSPECIFIED TYPE: Primary | ICD-10-CM

## 2022-06-13 LAB
FERRITIN: 114.7 NG/ML (ref 15–150)
IRON SATURATION: 19 % (ref 15–50)
IRON: 59 UG/DL (ref 37–145)
TOTAL IRON BINDING CAPACITY: 317 UG/DL (ref 260–445)

## 2022-06-13 RX ORDER — PRAVASTATIN SODIUM 20 MG
TABLET ORAL
Qty: 90 TABLET | Refills: 3 | Status: SHIPPED | OUTPATIENT
Start: 2022-06-13

## 2022-06-14 DIAGNOSIS — D64.9 ANEMIA, UNSPECIFIED TYPE: Primary | ICD-10-CM

## 2022-06-15 ENCOUNTER — NURSE ONLY (OUTPATIENT)
Dept: FAMILY MEDICINE CLINIC | Age: 67
End: 2022-06-15
Payer: MEDICARE

## 2022-06-15 DIAGNOSIS — D64.9 ANEMIA, UNSPECIFIED TYPE: ICD-10-CM

## 2022-06-15 PROCEDURE — 36415 COLL VENOUS BLD VENIPUNCTURE: CPT | Performed by: INTERNAL MEDICINE

## 2022-06-15 NOTE — PROGRESS NOTES
Labs done per physician order. Patient response: positive  Needle used: 23g  Location: Rt A/C  Specimen collected: Vit b12  When needed, pressure was applied until bleeding stopped. Band-aide applied. Patient was instructed to apply pressure if bleeding starts again and call the office if the bleeding persists.

## 2022-06-16 LAB
FOLATE: >20 NG/ML (ref 4.78–24.2)
VITAMIN B-12: 677 PG/ML (ref 211–911)

## 2022-06-17 ENCOUNTER — NURSE ONLY (OUTPATIENT)
Dept: FAMILY MEDICINE CLINIC | Age: 67
End: 2022-06-17
Payer: MEDICARE

## 2022-06-17 ENCOUNTER — TELEPHONE (OUTPATIENT)
Dept: FAMILY MEDICINE CLINIC | Age: 67
End: 2022-06-17

## 2022-06-17 DIAGNOSIS — D64.9 ANEMIA, UNSPECIFIED TYPE: Primary | ICD-10-CM

## 2022-06-17 LAB
HEMOCCULT STL QL: NORMAL

## 2022-06-17 PROCEDURE — 82270 OCCULT BLOOD FECES: CPT | Performed by: INTERNAL MEDICINE

## 2022-07-05 ENCOUNTER — PATIENT MESSAGE (OUTPATIENT)
Dept: FAMILY MEDICINE CLINIC | Age: 67
End: 2022-07-05

## 2022-07-05 DIAGNOSIS — M15.9 PRIMARY OSTEOARTHRITIS INVOLVING MULTIPLE JOINTS: Primary | ICD-10-CM

## 2022-07-26 SDOH — HEALTH STABILITY: PHYSICAL HEALTH: ON AVERAGE, HOW MANY MINUTES DO YOU ENGAGE IN EXERCISE AT THIS LEVEL?: 40 MIN

## 2022-07-26 SDOH — HEALTH STABILITY: PHYSICAL HEALTH: ON AVERAGE, HOW MANY DAYS PER WEEK DO YOU ENGAGE IN MODERATE TO STRENUOUS EXERCISE (LIKE A BRISK WALK)?: 3 DAYS

## 2022-07-26 ASSESSMENT — SOCIAL DETERMINANTS OF HEALTH (SDOH)
WITHIN THE LAST YEAR, HAVE YOU BEEN HUMILIATED OR EMOTIONALLY ABUSED IN OTHER WAYS BY YOUR PARTNER OR EX-PARTNER?: NO
WITHIN THE LAST YEAR, HAVE YOU BEEN KICKED, HIT, SLAPPED, OR OTHERWISE PHYSICALLY HURT BY YOUR PARTNER OR EX-PARTNER?: NO
WITHIN THE LAST YEAR, HAVE YOU BEEN AFRAID OF YOUR PARTNER OR EX-PARTNER?: NO
WITHIN THE LAST YEAR, HAVE TO BEEN RAPED OR FORCED TO HAVE ANY KIND OF SEXUAL ACTIVITY BY YOUR PARTNER OR EX-PARTNER?: NO

## 2022-07-27 ENCOUNTER — OFFICE VISIT (OUTPATIENT)
Dept: ORTHOPEDIC SURGERY | Age: 67
End: 2022-07-27
Payer: MEDICARE

## 2022-07-27 VITALS — HEIGHT: 64 IN | BODY MASS INDEX: 28.68 KG/M2 | WEIGHT: 168 LBS

## 2022-07-27 DIAGNOSIS — M15.9 PRIMARY OSTEOARTHRITIS INVOLVING MULTIPLE JOINTS: ICD-10-CM

## 2022-07-27 DIAGNOSIS — M79.671 RIGHT FOOT PAIN: Primary | ICD-10-CM

## 2022-07-27 PROCEDURE — 1036F TOBACCO NON-USER: CPT | Performed by: ORTHOPAEDIC SURGERY

## 2022-07-27 PROCEDURE — 1090F PRES/ABSN URINE INCON ASSESS: CPT | Performed by: ORTHOPAEDIC SURGERY

## 2022-07-27 PROCEDURE — G8417 CALC BMI ABV UP PARAM F/U: HCPCS | Performed by: ORTHOPAEDIC SURGERY

## 2022-07-27 PROCEDURE — 1123F ACP DISCUSS/DSCN MKR DOCD: CPT | Performed by: ORTHOPAEDIC SURGERY

## 2022-07-27 PROCEDURE — 3017F COLORECTAL CA SCREEN DOC REV: CPT | Performed by: ORTHOPAEDIC SURGERY

## 2022-07-27 PROCEDURE — G8427 DOCREV CUR MEDS BY ELIG CLIN: HCPCS | Performed by: ORTHOPAEDIC SURGERY

## 2022-07-27 PROCEDURE — G8399 PT W/DXA RESULTS DOCUMENT: HCPCS | Performed by: ORTHOPAEDIC SURGERY

## 2022-07-27 PROCEDURE — 99203 OFFICE O/P NEW LOW 30 MIN: CPT | Performed by: ORTHOPAEDIC SURGERY

## 2022-09-07 ENCOUNTER — OFFICE VISIT (OUTPATIENT)
Dept: ORTHOPEDIC SURGERY | Age: 67
End: 2022-09-07
Payer: MEDICARE

## 2022-09-07 VITALS — WEIGHT: 168 LBS | HEIGHT: 64 IN | BODY MASS INDEX: 28.68 KG/M2

## 2022-09-07 DIAGNOSIS — M77.41 METATARSALGIA OF RIGHT FOOT: Primary | ICD-10-CM

## 2022-09-07 PROCEDURE — 1036F TOBACCO NON-USER: CPT | Performed by: ORTHOPAEDIC SURGERY

## 2022-09-07 PROCEDURE — 3017F COLORECTAL CA SCREEN DOC REV: CPT | Performed by: ORTHOPAEDIC SURGERY

## 2022-09-07 PROCEDURE — 99213 OFFICE O/P EST LOW 20 MIN: CPT | Performed by: ORTHOPAEDIC SURGERY

## 2022-09-07 PROCEDURE — 1123F ACP DISCUSS/DSCN MKR DOCD: CPT | Performed by: ORTHOPAEDIC SURGERY

## 2022-09-07 PROCEDURE — G8399 PT W/DXA RESULTS DOCUMENT: HCPCS | Performed by: ORTHOPAEDIC SURGERY

## 2022-09-07 PROCEDURE — G8427 DOCREV CUR MEDS BY ELIG CLIN: HCPCS | Performed by: ORTHOPAEDIC SURGERY

## 2022-09-07 PROCEDURE — 1090F PRES/ABSN URINE INCON ASSESS: CPT | Performed by: ORTHOPAEDIC SURGERY

## 2022-09-07 PROCEDURE — G8417 CALC BMI ABV UP PARAM F/U: HCPCS | Performed by: ORTHOPAEDIC SURGERY

## 2022-09-07 NOTE — PROGRESS NOTES
CHIEF COMPLAINT: Right forefoot pain/ 2nd metatarsalgia. HISTORY:  Ms. Ramírez Cat 74 y.o.  female presents today for f/u and reported significant improvement with stretching and using MT bar inserts. She was seen on 7/27/2022 as a 2nd opinion and consultation evaluation of right forefoot pain which started Nov 2021 with no trauma. She is complaining of achy pain 2/10. Pain is increase with standing and walking. No numbness and tingling sensation. No other complaint. The patient is known to me for ORIF left ankle fracture dislocation, 5/23/2015. She saw Dr Betty Colon, had MRI and was scheduled for surgery on 10/12/2022 at [facility] for right bunion correction with 2nd MTP plantar plate repair. She cancelled her upcoming surgery with Dr Betty Colon as she is getting much better. Past Medical History:   Diagnosis Date    Essential hypertension 8/20/2018    History of 2019 novel coronavirus disease (COVID-19) 6/1/2022 5 2022    History of ankle fracture 5/23/2015    History of colonic polyps 10/23/2020    10.2020 sig tics, polyp ascending x 2 recheck 2025 Telephone Encounter - Kamilla Smith MD - 10/09/2014 10:23 AM EDT Doing better since hospital, finishing antibiotics. ICV \"polyp\" was not a true polyp; lymphoid aggregate; recommend keeping stool soft with fiber/fluids as discussed; next colon polyp surveillance in 5 to 7 years      Hypertension     Leukopenia 3/9/2022    Overweight (BMI 25.0-29. 9) 8/20/2018    Pernio 3/9/2022    Prediabetes 3/8/2022       Past Surgical History:   Procedure Laterality Date    ANKLE SURGERY Left 5/23/15    ORIF Left ankle fracture dislocation.        Social History     Socioeconomic History    Marital status:      Spouse name: Not on file    Number of children: Not on file    Years of education: Not on file    Highest education level: Not on file   Occupational History    Occupation: lab    Tobacco Use    Smoking status: Never    Smokeless tobacco: Never   Vaping Use Vaping Use: Never used   Substance and Sexual Activity    Alcohol use: Yes     Alcohol/week: 0.0 standard drinks     Comment: wine or beer     Drug use: No    Sexual activity: Yes     Partners: Male   Other Topics Concern    Not on file   Social History Narrative    Not on file     Social Determinants of Health     Financial Resource Strain: Low Risk     Difficulty of Paying Living Expenses: Not hard at all   Food Insecurity: No Food Insecurity    Worried About Running Out of Food in the Last Year: Never true    Ran Out of Food in the Last Year: Never true   Transportation Needs: Not on file   Physical Activity: Insufficiently Active    Days of Exercise per Week: 3 days    Minutes of Exercise per Session: 40 min   Stress: Not on file   Social Connections: Not on file   Intimate Partner Violence: Not At Risk    Fear of Current or Ex-Partner: No    Emotionally Abused: No    Physically Abused: No    Sexually Abused: No   Housing Stability: Not on file       No family history on file. Current Outpatient Medications on File Prior to Visit   Medication Sig Dispense Refill    diclofenac sodium (VOLTAREN) 1 % GEL APPLY FOUR GRAMS TOPICALLY TO THE SKIN FOUR TIMES A  g 5    pravastatin (PRAVACHOL) 20 MG tablet TAKE ONE TABLET BY MOUTH EVERY EVENING FOR CHOLESTEROL 90 tablet 3    lisinopril (PRINIVIL;ZESTRIL) 10 MG tablet TAKE ONE TABLET BY MOUTH DAILY FOR BLOOD PRESSURE - FOLLOW UP DUE IN MARCH 90 tablet 3    baclofen (LIORESAL) 10 MG tablet TAKE ONE TABLET BY MOUTH EVERY NIGHT AT BEDTIME AS NEEDED FOR BACK PAIN 30 tablet 2    Cholecalciferol (VITAMIN D) 50 MCG (2000 UT) CAPS capsule Take by mouth daily      Acetaminophen (TYLENOL PO) Take by mouth as needed      melatonin 3 MG TABS tablet Take 3 mg by mouth daily      IBUPROFEN PO Take by mouth       No current facility-administered medications on file prior to visit.        Pertinent items are noted in HPI  Review of systems reviewed from Patient History Form and available in the patient's chart under the Media tab. No change noted. PHYSICAL EXAMINATION:  Ms. Marigene Bloch is a very pleasant 74 y.o.  female who presents today in no acute distress, awake, alert, and oriented. She is well dressed, nourished and  groomed. Patient with normal affect. Height is  5' 4\" (1.626 m), weight is 168 lb (76.2 kg), Body mass index is 28.84 kg/m². Resting respiratory rate is 16. Examination of the gait, showed that the patient walks heel-toe with a non-antalgic gait and no limp. Examination of both ankles showing a good range of motion. She has dorsiflexion to about 5 degrees bilaterally, which increased with knee flexion. She has intact sensation and good pedal pulses. She has good strength in all four planes, including eversion, and has no tenderness on deep palpation over the right 2nd metatarsal head, without instability compared to the other side. The ankles are stable to drawer test bilaterally, ankle reflex 1+ equally bilaterally. No 2nd toe deformity, but small bunion deformity. IMAGING: Dawne Aase were reviewed, 3 views of the right foot taken in office 7/27/2022, and showed no acute fracture. No other abnormality. MRI right foot dated 4/22/2022 was reviewed and showed; High grade 2nd MTPJ plantar plate tear, intermediate plantar plate tear 3rd and 5th MTPJ. IMPRESSION: Right forefoot pain/ 2nd metatarsalgia. PLAN: I discussed with the patient the treatment options. We recommended continue stretching exercises of the calf which was taught to the patient today. She will take NSAID. Use soft orthosis, with metatarsal pad. F/U PRN, PT if indicated.       Aries Stovall MD

## 2022-10-10 NOTE — PROGRESS NOTES
HPI: Kenia Delarosa presents for discussion of anti-inflammatories    Chronic health issues include hypertension, DJD, family history adenomatous polyp, prediabetes. ,        Foot pain. History left fourth ankle. Bunion surgery canceled De Quervain's tenosynovitis. DJD back, neck, bunion. Tylenol, advil. achilles tendonitis  IT syndrome. Cortisone injections. Follows with podiatry and orthopedist.  Possible toe surgery in the future. Second opion with aribi recommended not having surgery. Gave bunion brace. Hx motrin with GI symptoms. Voltaren stings. Wonders about using oral medication. Did have dyspepsia when she was on Motrin in the past has been on Voltaren orally. ,     Transient eyelid edema it from  resolved with medication. No recurrence. Sores hand and toes usually with with cold. Dr garcia noticed with pernio diagnosis. No known autoimmune disease negative hepatitis C. Up-to-date on cancer screening. No family history of autoimmune disease. Occurs mostly in cold weather. Has not tried nifedipine no concerns with hepatitis or connective tissue disease. Has not had serologies      Hx anemia leukopenia. No recurrent infections. She is to have a repeat CBC     HTN contoled with lisinopril. + yoga, walking, FH early MI. ldl 130's ASCVD 8.6. + BP cuff. Control blood pressure. Significant weight loss prior prediabetes. Silver sneakers and weight watchers      vaginal deliveries BI-RADS 2 mammogram 2022 bone density 2019 T score -1.8 femoral neck. Positive vitamin D. Frequent cortisone injections. No vaginal bleeding. [id number] polyp recheck . .  Family history adenomatous polyps. + constipation. psyllium q pm and bene fiber in am.      Specialists    optho    Physical medicine/rehab    Dermatology    GI    Eye    dentist    Ortho/podiatrist        PMH:    Ankle fracture     Colonoscopy with polyps              SH   Daughter and 2 babies [place]. . 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 Rare alcohol. No tobacco or recreational drugs. floor exercise and stretch 4 times weekly. [facility]         [facility] 4 sisters    +sister with adenomatous popyl\ DM, MI,     - breast or ovarian cancer, mental illness     ROS: Ocular migraines less since [facility]. . No concussions or seizures. Up-to-date with eye exams. Glasses. Follows with dermatology. Keara Meyer Up-to-date with dentist. Denies any wheezing pneumonias abnormal chest x-rays. No chest pain persistent palpitations. No breast masses or discharge. Upcoming mammogram.  Rare GE reflux. Positive colon polyps recheck 2026 denies any kidney stones. Remote urinary tract infection. No vaginal discharge. Mild dyspareunia. History of basal cell? No depression or anxiety. BMI is up. No knee pain. No apnea.     Constitutional, ent, CV, respiratory, GI, , joint, skin, allergic and psychiatric ROS reviewed and negative except for above    Allergies   Allergen Reactions    Sulfa Antibiotics Hives       Outpatient Medications Marked as Taking for the 10/11/22 encounter (Office Visit) with Jonathon Collins MD   Medication Sig Dispense Refill    meloxicam (MOBIC) 7.5 MG tablet 1-2 po q day for pain 60 tablet 0    diclofenac sodium (VOLTAREN) 1 % GEL APPLY FOUR GRAMS TOPICALLY TO THE SKIN FOUR TIMES A  g 5    pravastatin (PRAVACHOL) 20 MG tablet TAKE ONE TABLET BY MOUTH EVERY EVENING FOR CHOLESTEROL 90 tablet 3    lisinopril (PRINIVIL;ZESTRIL) 10 MG tablet TAKE ONE TABLET BY MOUTH DAILY FOR BLOOD PRESSURE - FOLLOW UP DUE IN MARCH 90 tablet 3    Cholecalciferol (VITAMIN D) 50 MCG (2000 UT) CAPS capsule Take by mouth daily      Acetaminophen (TYLENOL PO) Take by mouth as needed      melatonin 3 MG TABS tablet Take 3 mg by mouth daily               Past Medical History:   Diagnosis Date    Essential hypertension 8/20/2018    History of 2019 novel coronavirus disease (COVID-19) 6/1/2022 5 2022    History of ankle fracture 5/23/2015    History of colonic polyps 10/23/2020    10.2020 sig tics, polyp ascending x 2 recheck 2025 Telephone Encounter - Lis Johnson MD - 10/09/2014 10:23 AM EDT Doing better since hospital, finishing antibiotics. ICV \"polyp\" was not a true polyp; lymphoid aggregate; recommend keeping stool soft with fiber/fluids as discussed; next colon polyp surveillance in 5 to 7 years      Hypertension     Leukopenia 3/9/2022    Overweight (BMI 25.0-29. 9) 8/20/2018    Pernio 3/9/2022    Prediabetes 3/8/2022       Past Surgical History:   Procedure Laterality Date    ANKLE SURGERY Left 5/23/15    ORIF Left ankle fracture dislocation. Objective     /80   Pulse 66   Resp 16   Ht 5' 4\" (1.626 m)   Wt 174 lb (78.9 kg)   LMP 05/23/2005 (Approximate)   SpO2 98%   BMI 29.87 kg/m²     @LASTSAO2(3)@    Wt Readings from Last 3 Encounters:   09/07/22 168 lb (76.2 kg)   07/27/22 168 lb (76.2 kg)   03/08/22 168 lb (76.2 kg)       Physical Exam     NAD alert and cooperative  HEENT: Throat is clear. Good range of motion of the neck. Lungs are clear no wheezes rales or rhonchi  Cardiovascular exam regular rate and rhythm without any murmur click. Abdomen is benign. No suspicious skin lesions or nodules. Pain in the Achilles and some prominent bony prominence on the lateral aspect of the left talas   She is appropriate, well-groomed.   Deliberate    Chemistry        Component Value Date/Time     03/08/2022 1009    K 4.4 03/08/2022 1009    CL 98 (L) 03/08/2022 1009    CO2 22 03/08/2022 1009    BUN 12 03/08/2022 1009    CREATININE 0.7 03/08/2022 1009        Component Value Date/Time    CALCIUM 9.9 03/08/2022 1009    ALKPHOS 87 06/10/2022 0933    AST 18 06/10/2022 0933    ALT 14 06/10/2022 0933    BILITOT 0.6 06/10/2022 0933            Lab Results   Component Value Date    WBC 6.5 06/10/2022    HGB 11.7 (L) 06/10/2022    HCT 35.5 (L) 06/10/2022    MCV 88.8 06/10/2022     06/10/2022     Lab Results   Component Value Date    LABA1C 5.3 03/08/2022     Lab Results   Component Value Date    .6 03/22/2021     Lab Results   Component Value Date    LABA1C 5.3 03/08/2022     No components found for: CHLPL  Lab Results   Component Value Date    TRIG 61 03/08/2022    TRIG 69 09/28/2020    TRIG 59 08/22/2018     Lab Results   Component Value Date    HDL 76 (H) 06/10/2022    HDL 73 (H) 03/08/2022    HDL 72 (H) 03/22/2021     Lab Results   Component Value Date    LDLCALC 99 06/10/2022    LDLCALC 125 (H) 03/08/2022    LDLCALC 126 (H) 03/22/2021     Lab Results   Component Value Date    LABVLDL 8 06/10/2022    LABVLDL 12 03/08/2022    LABVLDL 13 03/22/2021       Old labs and records reviewed or requested  Discussed past lab and studies with patient      Diagnosis Orders   1. Essential hypertension        2. Hypercholesterolemia        3. Osteoarthritis of spine, unspecified spinal osteoarthritis complication status, unspecified spinal region        4. Osteopenia, unspecified location        5. History of colonic polyps        6. Migraine with aura and without status migrainosus, not intractable        7. Leukopenia, unspecified type        8. History of anemia  CBC with Auto Differential        Hypertension good control on current medication will continue. Hyperlipidemia. Osteoarthritis with foot involvement we will try Mobic 7.5 mg 1-2 daily. If this not effective trial of Celebrex. Osteopenia exercise weightbearing vitamin D. History of colonic polyps recheck in 2025. History of leukopenia CBC differential and platelets. Pernio consider nifedipine if recurrent issues in the cold can switch from lisinopril.   Also if recurrent episodes consider evaluation with autoimmune disease viral hepatitis screening      On this date I have spent 20 minutes reviewing previous notes, test results, and face to face with the patient discussing the diagnosis and plan          No follow-ups on file. Diagnosis and treatment discussed.   Possible side effects of medication reviewed  Patients questions answered  Follow up understood  Pt aware if they are not contacted about any test results , this does not mean they are normal.  They should call

## 2022-10-11 ENCOUNTER — OFFICE VISIT (OUTPATIENT)
Dept: FAMILY MEDICINE CLINIC | Age: 67
End: 2022-10-11
Payer: MEDICARE

## 2022-10-11 VITALS
HEIGHT: 64 IN | BODY MASS INDEX: 29.71 KG/M2 | SYSTOLIC BLOOD PRESSURE: 134 MMHG | WEIGHT: 174 LBS | RESPIRATION RATE: 16 BRPM | OXYGEN SATURATION: 98 % | DIASTOLIC BLOOD PRESSURE: 80 MMHG | HEART RATE: 66 BPM

## 2022-10-11 DIAGNOSIS — G43.109 MIGRAINE WITH AURA AND WITHOUT STATUS MIGRAINOSUS, NOT INTRACTABLE: ICD-10-CM

## 2022-10-11 DIAGNOSIS — Z86.2 HISTORY OF ANEMIA: ICD-10-CM

## 2022-10-11 DIAGNOSIS — E78.00 HYPERCHOLESTEROLEMIA: ICD-10-CM

## 2022-10-11 DIAGNOSIS — D72.819 LEUKOPENIA, UNSPECIFIED TYPE: ICD-10-CM

## 2022-10-11 DIAGNOSIS — M85.80 OSTEOPENIA, UNSPECIFIED LOCATION: ICD-10-CM

## 2022-10-11 DIAGNOSIS — Z86.010 HISTORY OF COLONIC POLYPS: ICD-10-CM

## 2022-10-11 DIAGNOSIS — I10 ESSENTIAL HYPERTENSION: Primary | ICD-10-CM

## 2022-10-11 DIAGNOSIS — M47.9 OSTEOARTHRITIS OF SPINE, UNSPECIFIED SPINAL OSTEOARTHRITIS COMPLICATION STATUS, UNSPECIFIED SPINAL REGION: ICD-10-CM

## 2022-10-11 LAB
BASOPHILS ABSOLUTE: 0 K/UL (ref 0–0.2)
BASOPHILS RELATIVE PERCENT: 0.4 %
EOSINOPHILS ABSOLUTE: 0.1 K/UL (ref 0–0.6)
EOSINOPHILS RELATIVE PERCENT: 2.4 %
HCT VFR BLD CALC: 36.2 % (ref 36–48)
HEMOGLOBIN: 11.9 G/DL (ref 12–16)
LYMPHOCYTES ABSOLUTE: 2.5 K/UL (ref 1–5.1)
LYMPHOCYTES RELATIVE PERCENT: 42.7 %
MCH RBC QN AUTO: 29.3 PG (ref 26–34)
MCHC RBC AUTO-ENTMCNC: 32.9 G/DL (ref 31–36)
MCV RBC AUTO: 89 FL (ref 80–100)
MONOCYTES ABSOLUTE: 0.3 K/UL (ref 0–1.3)
MONOCYTES RELATIVE PERCENT: 5.1 %
NEUTROPHILS ABSOLUTE: 2.9 K/UL (ref 1.7–7.7)
NEUTROPHILS RELATIVE PERCENT: 49.4 %
PDW BLD-RTO: 13.9 % (ref 12.4–15.4)
PLATELET # BLD: 212 K/UL (ref 135–450)
PMV BLD AUTO: 7.6 FL (ref 5–10.5)
RBC # BLD: 4.06 M/UL (ref 4–5.2)
WBC # BLD: 5.8 K/UL (ref 4–11)

## 2022-10-11 PROCEDURE — G8427 DOCREV CUR MEDS BY ELIG CLIN: HCPCS | Performed by: INTERNAL MEDICINE

## 2022-10-11 PROCEDURE — G8417 CALC BMI ABV UP PARAM F/U: HCPCS | Performed by: INTERNAL MEDICINE

## 2022-10-11 PROCEDURE — 1123F ACP DISCUSS/DSCN MKR DOCD: CPT | Performed by: INTERNAL MEDICINE

## 2022-10-11 PROCEDURE — G8484 FLU IMMUNIZE NO ADMIN: HCPCS | Performed by: INTERNAL MEDICINE

## 2022-10-11 PROCEDURE — 36415 COLL VENOUS BLD VENIPUNCTURE: CPT | Performed by: INTERNAL MEDICINE

## 2022-10-11 PROCEDURE — 3017F COLORECTAL CA SCREEN DOC REV: CPT | Performed by: INTERNAL MEDICINE

## 2022-10-11 PROCEDURE — G8399 PT W/DXA RESULTS DOCUMENT: HCPCS | Performed by: INTERNAL MEDICINE

## 2022-10-11 PROCEDURE — 1090F PRES/ABSN URINE INCON ASSESS: CPT | Performed by: INTERNAL MEDICINE

## 2022-10-11 PROCEDURE — 1036F TOBACCO NON-USER: CPT | Performed by: INTERNAL MEDICINE

## 2022-10-11 PROCEDURE — 99213 OFFICE O/P EST LOW 20 MIN: CPT | Performed by: INTERNAL MEDICINE

## 2022-10-11 RX ORDER — MELOXICAM 7.5 MG/1
TABLET ORAL
Qty: 60 TABLET | Refills: 0 | Status: SHIPPED | OUTPATIENT
Start: 2022-10-11

## 2022-10-11 NOTE — PATIENT INSTRUCTIONS
You recommend getting a COVID booster. Glandular rashes been doing better. Stay warm. Some people use a calcium channel blocker blood pressure medicine if they continue to have some difficulty in cold weather. We should have your laboratories back within the week  Let us know how you are doing with your new medicine or some other alternatives as well for example Celebrex    Be sure and call to schedule an appointment with a new PCP below. I think this is a good group. Continue weightbearing exercise and vitamin D. Consider bone density in the next few years. Do not forget your colonoscopy in 2026.     Thank you for allowing me to be involved in your care the best    MD Cristina Davidson MD  [facility] internal [facility] [address] [facility] [address]  [address]  [phone]

## 2022-11-29 SDOH — HEALTH STABILITY: PHYSICAL HEALTH: ON AVERAGE, HOW MANY DAYS PER WEEK DO YOU ENGAGE IN MODERATE TO STRENUOUS EXERCISE (LIKE A BRISK WALK)?: 3 DAYS

## 2022-11-29 SDOH — HEALTH STABILITY: PHYSICAL HEALTH: ON AVERAGE, HOW MANY MINUTES DO YOU ENGAGE IN EXERCISE AT THIS LEVEL?: 40 MIN

## 2022-12-01 ENCOUNTER — OFFICE VISIT (OUTPATIENT)
Dept: INTERNAL MEDICINE CLINIC | Age: 67
End: 2022-12-01

## 2022-12-01 VITALS
WEIGHT: 171.2 LBS | DIASTOLIC BLOOD PRESSURE: 82 MMHG | BODY MASS INDEX: 29.39 KG/M2 | SYSTOLIC BLOOD PRESSURE: 129 MMHG | TEMPERATURE: 97.3 F | HEART RATE: 63 BPM | OXYGEN SATURATION: 99 %

## 2022-12-01 DIAGNOSIS — Z00.00 MEDICARE ANNUAL WELLNESS VISIT, SUBSEQUENT: Primary | ICD-10-CM

## 2022-12-01 DIAGNOSIS — D64.9 ANEMIA, UNSPECIFIED TYPE: ICD-10-CM

## 2022-12-01 DIAGNOSIS — M76.62 ACHILLES TENDINITIS OF BOTH LOWER EXTREMITIES: ICD-10-CM

## 2022-12-01 DIAGNOSIS — I10 ESSENTIAL HYPERTENSION: ICD-10-CM

## 2022-12-01 DIAGNOSIS — M76.61 ACHILLES TENDINITIS OF BOTH LOWER EXTREMITIES: ICD-10-CM

## 2022-12-01 DIAGNOSIS — Z78.0 POSTMENOPAUSAL: ICD-10-CM

## 2022-12-01 DIAGNOSIS — E78.00 HYPERCHOLESTEROLEMIA: ICD-10-CM

## 2022-12-01 LAB
ANION GAP SERPL CALCULATED.3IONS-SCNC: 14 MMOL/L (ref 3–16)
BASOPHILS ABSOLUTE: 0 K/UL (ref 0–0.2)
BASOPHILS RELATIVE PERCENT: 0.6 %
BUN BLDV-MCNC: 15 MG/DL (ref 7–20)
CALCIUM SERPL-MCNC: 9.8 MG/DL (ref 8.3–10.6)
CHLORIDE BLD-SCNC: 101 MMOL/L (ref 99–110)
CHOLESTEROL, FASTING: 206 MG/DL (ref 0–199)
CO2: 26 MMOL/L (ref 21–32)
CREAT SERPL-MCNC: 0.7 MG/DL (ref 0.6–1.2)
EOSINOPHILS ABSOLUTE: 0.1 K/UL (ref 0–0.6)
EOSINOPHILS RELATIVE PERCENT: 3.3 %
GFR SERPL CREATININE-BSD FRML MDRD: >60 ML/MIN/{1.73_M2}
GLUCOSE BLD-MCNC: 92 MG/DL (ref 70–99)
HCT VFR BLD CALC: 37.5 % (ref 36–48)
HDLC SERPL-MCNC: 81 MG/DL (ref 40–60)
HEMOGLOBIN: 12.3 G/DL (ref 12–16)
LDL CHOLESTEROL CALCULATED: 114 MG/DL
LYMPHOCYTES ABSOLUTE: 2 K/UL (ref 1–5.1)
LYMPHOCYTES RELATIVE PERCENT: 46.2 %
MCH RBC QN AUTO: 28.8 PG (ref 26–34)
MCHC RBC AUTO-ENTMCNC: 32.8 G/DL (ref 31–36)
MCV RBC AUTO: 87.9 FL (ref 80–100)
MONOCYTES ABSOLUTE: 0.3 K/UL (ref 0–1.3)
MONOCYTES RELATIVE PERCENT: 7.4 %
NEUTROPHILS ABSOLUTE: 1.9 K/UL (ref 1.7–7.7)
NEUTROPHILS RELATIVE PERCENT: 42.5 %
PDW BLD-RTO: 14.1 % (ref 12.4–15.4)
PLATELET # BLD: 225 K/UL (ref 135–450)
PMV BLD AUTO: 8.3 FL (ref 5–10.5)
POTASSIUM SERPL-SCNC: 4.5 MMOL/L (ref 3.5–5.1)
RBC # BLD: 4.26 M/UL (ref 4–5.2)
SODIUM BLD-SCNC: 141 MMOL/L (ref 136–145)
TRIGLYCERIDE, FASTING: 56 MG/DL (ref 0–150)
VLDLC SERPL CALC-MCNC: 11 MG/DL
WBC # BLD: 4.4 K/UL (ref 4–11)

## 2022-12-01 RX ORDER — MELOXICAM 7.5 MG/1
TABLET ORAL
Qty: 60 TABLET | Refills: 0 | Status: SHIPPED | OUTPATIENT
Start: 2022-12-01

## 2022-12-01 ASSESSMENT — ENCOUNTER SYMPTOMS
BACK PAIN: 0
CHEST TIGHTNESS: 0
ABDOMINAL PAIN: 0
VOMITING: 0
RHINORRHEA: 0
SORE THROAT: 0
COUGH: 0
EYE REDNESS: 0
NAUSEA: 0
EYE DISCHARGE: 0

## 2022-12-01 ASSESSMENT — PATIENT HEALTH QUESTIONNAIRE - PHQ9
SUM OF ALL RESPONSES TO PHQ9 QUESTIONS 1 & 2: 0
SUM OF ALL RESPONSES TO PHQ QUESTIONS 1-9: 0
SUM OF ALL RESPONSES TO PHQ QUESTIONS 1-9: 0
1. LITTLE INTEREST OR PLEASURE IN DOING THINGS: 0
SUM OF ALL RESPONSES TO PHQ QUESTIONS 1-9: 0
SUM OF ALL RESPONSES TO PHQ QUESTIONS 1-9: 0
2. FEELING DOWN, DEPRESSED OR HOPELESS: 0

## 2022-12-01 ASSESSMENT — LIFESTYLE VARIABLES
HOW OFTEN DO YOU HAVE A DRINK CONTAINING ALCOHOL: 2-4 TIMES A MONTH
HOW MANY STANDARD DRINKS CONTAINING ALCOHOL DO YOU HAVE ON A TYPICAL DAY: 1 OR 2

## 2022-12-01 NOTE — PATIENT INSTRUCTIONS
Personalized Preventive Plan for Lona Sims - 12/1/2022  Medicare offers a range of preventive health benefits. Some of the tests and screenings are paid in full while other may be subject to a deductible, co-insurance, and/or copay. Some of these benefits include a comprehensive review of your medical history including lifestyle, illnesses that may run in your family, and various assessments and screenings as appropriate. After reviewing your medical record and screening and assessments performed today your provider may have ordered immunizations, labs, imaging, and/or referrals for you. A list of these orders (if applicable) as well as your Preventive Care list are included within your After Visit Summary for your review. Other Preventive Recommendations:    A preventive eye exam performed by an eye specialist is recommended every 1-2 years to screen for glaucoma; cataracts, macular degeneration, and other eye disorders. A preventive dental visit is recommended every 6 months. Try to get at least 150 minutes of exercise per week or 10,000 steps per day on a pedometer . Order or download the FREE \"Exercise & Physical Activity: Your Everyday Guide\" from [employer]. Call 6-778.250.3356 or search [employer] online. You need 8319-7824 mg of calcium and 9260-0551 IU of vitamin D per day. It is possible to meet your calcium requirement with diet alone, but a vitamin D supplement is usually necessary to meet this goal.  When exposed to the sun, use a sunscreen that protects against both UVA and UVB radiation with an SPF of 30 or greater. Reapply every 2 to 3 hours or after sweating, drying off with a towel, or swimming. Always wear a seat belt when traveling in a car. Always wear a helmet when riding a bicycle or motorcycle.

## 2022-12-01 NOTE — PROGRESS NOTES
Marguerite Castillo (:  1955) is a 74 y.o. female,New patient, here for evaluation of the following chief complaint(s):  Established New Doctor    Chronic health issues include hypertension, DJD, family history adenomatous polyp, prediabetes. , bunion, achilis tendinitis. Transient eyelid edema it from  resolved with medication. No recurrence. ----Hx anemia leukopenia. No recurrent infections. She is to have a repeat CBC    [id number] polyp recheck . .  Family history adenomatous polyps. + constipation. psyllium q pm and bene fiber in am.     2022- BIRAD 2- f/u in 1 year. [id number] polyp recheck .  2019- Dexa- osteopenia. Exercise- yoga, dog walk. 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 Subjective   SUBJECTIVE/OBJECTIVE:  HPI    Review of Systems   Constitutional:  Negative for chills, fatigue and fever. HENT:  Negative for congestion, ear pain, rhinorrhea and sore throat. Eyes:  Negative for discharge, redness and visual disturbance. Respiratory:  Negative for cough and chest tightness. Cardiovascular:  Negative for chest pain, palpitations and leg swelling. Gastrointestinal:  Negative for abdominal pain, nausea and vomiting. Endocrine: Negative. Genitourinary:  Negative for dysuria. Musculoskeletal:  Negative for back pain and gait problem. Achillis tendinitis, bunion (right), doing stretching exercises, follows orthopaedics and podiatrist.   Skin: Negative. Neurological:  Negative for syncope, facial asymmetry, speech difficulty, light-headedness and headaches. Objective   Physical Exam  Vitals reviewed. Constitutional:       Appearance: Normal appearance. HENT:      Head: Normocephalic. Eyes:      Extraocular Movements: Extraocular movements intact. Pupils: Pupils are equal, round, and reactive to light.    Cardiovascular: Rate and Rhythm: Normal rate. Heart sounds: Normal heart sounds. No murmur heard. Pulmonary:      Effort: Pulmonary effort is normal.      Breath sounds: Normal breath sounds. Abdominal:      General: Bowel sounds are normal.      Palpations: Abdomen is soft. Musculoskeletal:         General: Normal range of motion. Skin:     General: Skin is warm. Neurological:      General: No focal deficit present. Mental Status: She is alert and oriented to person, place, and time. Mental status is at baseline. Psychiatric:         Mood and Affect: Mood normal.                Medicare Annual Wellness Visit    Assessment & Plan     1. Medicare annual wellness visit, subsequent  2. Essential hypertension-well-controlled, continue with lisinopril 10 mg daily. Will get her BMP. -     Basic Metabolic Panel; Future  3. Hypercholesterolemia-tolerating pravastatin 20 mg daily, continue with the same.  -     Lipid, Fasting; Future  4. Postmenopausal-known history of osteopenia, on vitamin D daily, calcium rich food and exercise. -     DEXA BONE DENSITY 2 SITES; Future  5. Anemia, unspecified type-  Known history of low hemoglobin, had stopped ibuprofen. Labs from before shows a normal liver function test, normal vitamin B12, normal iron studies. Meitzer's index 21, not suggestive of thalassemia. Colonoscopy unremarkable except for polyps. Continue to monitor, advised on diet. We will repeat her labs. -     CBC with Auto Differential; Future    6.  Achilles tendinitis of both lower extremities-improves with meloxicam.  Patient has sulfa allergy, not been on celecoxib  -     meloxicam (MOBIC) 7.5 MG tablet; 1-2 po q day for pain, Disp-60 tablet, R-0Normal          Recommendations for Preventive Services Due: see orders and patient instructions/AVS.  Recommended screening schedule for the next 5-10 years is provided to the patient in written form: see Patient Instructions/AVS.     Return in 6 months (on 6/1/2023) for Medicare Annual Wellness Visit in 1 year. Subjective   The following acute and/or chronic problems were also addressed today:  HTN. HLD. Patient's complete Health Risk Assessment and screening values have been reviewed and are found in Flowsheets. The following problems were reviewed today and where indicated follow up appointments were made and/or referrals ordered. Positive Risk Factor Screenings with Interventions:             General Health and ACP:  General  In general, how would you say your health is?: Very Good  In the past 7 days, have you experienced any of the following: New or Increased Pain, New or Increased Fatigue, Loneliness, Social Isolation, Stress or Anger?: No  Do you get the social and emotional support that you need?: Yes  Do you have a Living Will?: Yes    Advance Directives       Power of  Living Will ACP-Advance Directive ACP-Power of     Not on File Not on File Not on File Not on File          General Health Risk Interventions:  Diet and exercise. Objective   Vitals:    12/01/22 0945   BP: 129/82   Site: Left Upper Arm   Position: Sitting   Cuff Size: Medium Adult   Pulse: 63   Temp: 97.3 °F (36.3 °C)   TempSrc: Temporal   SpO2: 99%   Weight: 171 lb 3.2 oz (77.7 kg)      Body mass index is 29.39 kg/m². Allergies   Allergen Reactions    Sulfa Antibiotics Hives     Prior to Visit Medications    Medication Sig Taking?  Authorizing Provider   meloxicam (MOBIC) 7.5 MG tablet 1-2 po q day for pain Yes Christina Navarro MD   diclofenac sodium (VOLTAREN) 1 % GEL APPLY FOUR GRAMS TOPICALLY TO THE SKIN FOUR TIMES A DAY Yes Emre Roper MD   pravastatin (PRAVACHOL) 20 MG tablet TAKE ONE TABLET BY MOUTH EVERY EVENING FOR CHOLESTEROL Yes Emre Roper MD   lisinopril (PRINIVIL;ZESTRIL) 10 MG tablet TAKE ONE TABLET BY MOUTH DAILY FOR BLOOD PRESSURE - FOLLOW UP DUE IN MARCH Yes Emre Roper MD   Cholecalciferol (VITAMIN D) 50 MCG (2000 UT) CAPS capsule Take by mouth daily Yes Historical Provider, MD   Acetaminophen (TYLENOL PO) Take by mouth as needed Yes Historical Provider, MD   melatonin 3 MG TABS tablet Take 3 mg by mouth daily Yes Historical Provider, MD       [facility] (Including outside providers/suppliers regularly involved in providing care):   Patient Care Team:  Melissa Mariee MD as PCP - General (Internal Medicine)     Reviewed and updated this visit:  Tobacco  Allergies  Meds  Med Hx  Surg Hx  Soc Hx  Fam Hx

## 2022-12-02 DIAGNOSIS — E78.00 HYPERCHOLESTEROLEMIA: Primary | ICD-10-CM

## 2022-12-02 RX ORDER — PRAVASTATIN SODIUM 40 MG
TABLET ORAL
Qty: 90 TABLET | Refills: 2 | Status: SHIPPED | OUTPATIENT
Start: 2022-12-02

## 2023-01-08 SDOH — HEALTH STABILITY: PHYSICAL HEALTH: ON AVERAGE, HOW MANY MINUTES DO YOU ENGAGE IN EXERCISE AT THIS LEVEL?: 40 MIN

## 2023-01-08 SDOH — HEALTH STABILITY: PHYSICAL HEALTH: ON AVERAGE, HOW MANY DAYS PER WEEK DO YOU ENGAGE IN MODERATE TO STRENUOUS EXERCISE (LIKE A BRISK WALK)?: 3 DAYS

## 2023-01-11 ENCOUNTER — OFFICE VISIT (OUTPATIENT)
Dept: ORTHOPEDIC SURGERY | Age: 68
End: 2023-01-11

## 2023-01-11 VITALS — HEIGHT: 64 IN | BODY MASS INDEX: 29.88 KG/M2 | WEIGHT: 175 LBS

## 2023-01-11 DIAGNOSIS — M92.62 HAGLUND'S DEFORMITY OF LEFT HEEL: Primary | ICD-10-CM

## 2023-01-11 DIAGNOSIS — G89.29 CHRONIC PAIN OF LEFT ANKLE: ICD-10-CM

## 2023-01-11 DIAGNOSIS — M25.572 CHRONIC PAIN OF LEFT ANKLE: ICD-10-CM

## 2023-01-11 DIAGNOSIS — M67.874 ACHILLES TENDINOSIS OF LEFT ANKLE: ICD-10-CM

## 2023-01-11 RX ORDER — DEXAMETHASONE SODIUM PHOSPHATE 4 MG/ML
INJECTION, SOLUTION INTRA-ARTICULAR; INTRALESIONAL; INTRAMUSCULAR; INTRAVENOUS; SOFT TISSUE
Qty: 30 ML | Refills: 0 | Status: SHIPPED | OUTPATIENT
Start: 2023-01-11

## 2023-01-11 NOTE — PROGRESS NOTES
CHIEF COMPLAINT: Left posterior heel pain/Merrill's deformity with insertinal Achillis tendenosis. HISTORY:  Ms. Anuradha Wynn 69 y.o.  female presents today for the first visit for evaluation of left posterior heel pain which started Dec 2021. She is complaining of sharp pain, 3/10. Pain is increase with standing and walking and shoe wear. Pain is sharp early in the morning with first few steps, dull achy pain by the end of the day. No radiation and no numbness and tingling sensation. No other complaint. The patient is known to me for ORIF left ankle fracture dislocation, 5/23/2015. She saw Dr Toña Martin, had MRI and was scheduled for surgery on 10/12/2022 at [facility] for right bunion correction with 2nd MTP plantar plate repair. She cancelled her upcoming surgery with Dr Toña Martin as she is getting much better. Past Medical History:   Diagnosis Date    Essential hypertension 8/20/2018    History of 2019 novel coronavirus disease (COVID-19) 6/1/2022 5 2022    History of ankle fracture 5/23/2015    History of colonic polyps 10/23/2020    10.2020 sig tics, polyp ascending x 2 recheck 2025 Telephone Encounter - Lizbet Matthews MD - 10/09/2014 10:23 AM EDT Doing better since hospital, finishing antibiotics. ICV \"polyp\" was not a true polyp; lymphoid aggregate; recommend keeping stool soft with fiber/fluids as discussed; next colon polyp surveillance in 5 to 7 years      Hypertension     Leukopenia 3/9/2022    Overweight (BMI 25.0-29. 9) 8/20/2018    Pernio 3/9/2022    Prediabetes 3/8/2022       Past Surgical History:   Procedure Laterality Date    ANKLE SURGERY Left 5/23/15    ORIF Left ankle fracture dislocation.        Social History     Socioeconomic History    Marital status:      Spouse name: Not on file    Number of children: Not on file    Years of education: Not on file    Highest education level: Not on file   Occupational History    Occupation: lab    Tobacco Use    Smoking status: Never Smokeless tobacco: Never   Vaping Use    Vaping Use: Never used   Substance and Sexual Activity    Alcohol use: Yes     Comment: ocassionally wine or beer in weekends. Drug use: No    Sexual activity: Yes     Partners: Male   Other Topics Concern    Not on file   Social History Narrative    Not on file     Social Determinants of Health     Financial Resource Strain: Low Risk     Difficulty of Paying Living Expenses: Not hard at all   Food Insecurity: No Food Insecurity    Worried About Running Out of Food in the Last Year: Never true    Ran Out of Food in the Last Year: Never true   Transportation Needs: Not on file   Physical Activity: Insufficiently Active    Days of Exercise per Week: 3 days    Minutes of Exercise per Session: 40 min   Stress: Not on file   Social Connections: Not on file   Intimate Partner Violence: Not At Risk    Fear of Current or Ex-Partner: No    Emotionally Abused: No    Physically Abused: No    Sexually Abused: No   Housing Stability: Not on file       Family History   Problem Relation Age of Onset    Alzheimer's Disease Father     Angina Father     Diabetes Maternal Grandmother        Current Outpatient Medications on File Prior to Visit   Medication Sig Dispense Refill    pravastatin (PRAVACHOL) 40 MG tablet TAKE ONE TABLET BY MOUTH EVERY EVENING FOR CHOLESTEROL 90 tablet 2    meloxicam (MOBIC) 7.5 MG tablet 1-2 po q day for pain 60 tablet 0    diclofenac sodium (VOLTAREN) 1 % GEL APPLY FOUR GRAMS TOPICALLY TO THE SKIN FOUR TIMES A  g 5    lisinopril (PRINIVIL;ZESTRIL) 10 MG tablet TAKE ONE TABLET BY MOUTH DAILY FOR BLOOD PRESSURE - FOLLOW UP DUE IN MARCH 90 tablet 3    Cholecalciferol (VITAMIN D) 50 MCG (2000 UT) CAPS capsule Take by mouth daily      Acetaminophen (TYLENOL PO) Take by mouth as needed      melatonin 3 MG TABS tablet Take 3 mg by mouth daily       No current facility-administered medications on file prior to visit.        Pertinent items are noted in HPI  Review of systems reviewed from Patient History Form and available in the patient's chart under the Media tab. No change noted. PHYSICAL EXAMINATION:  Ms. Elizabeth Valverde is a very pleasant 69 y.o.  female who presents today in no acute distress, awake, alert, and oriented. She is well dressed, nourished and  groomed. Patient with normal affect. Height is  5' 4\" (1.626 m), weight is 175 lb (79.4 kg), Body mass index is 30.04 kg/m². Resting respiratory rate is 16. Examination of the gait, showed that the patient walks heel-toe with a non-antalgic gait and no limp. Examination of both ankles showing a good range of motion. She has dorsiflexion to about 5 degrees bilaterally, which increased with knee flexion. She has intact sensation and good pedal pulses. She has good strength in all four planes, including eversion, and has tenderness on deep palpation over the left posterior calcaneal tubercle, with prominent Merrill's compared to the other side. The ankles are stable to drawer test bilaterally, equally. IMAGING:  Xray's were reviewed. 3 views of the left ankle taken in office today, and showed no acute fracture. 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 PLAN: I discussed with the patient the treatment options. We recommended stretching exercises of the calf which was taught to the patient today. She will take NSAIDS. Use backless shoes. I discussed with the patient that I think that she would really benefit from a course of physical therapy with Iontophoresis for further strengthening and stretching. An Rx for physical therapy was given to the patient. F/u in 6 weeks. She understands that this may need surgery if the pain did not to resolve.         Viviane Castanon MD

## 2023-02-01 NOTE — PLAN OF CARE
[facility]. [address]  Phone: [phone]   Fax:     [phone]                                                     Physical Therapy Certification    Dear Reji Bagley MD,I think this patient may benefit from dry needling and may attempt it at some point. .  Please sign the following if you are in agreement with this. If you have any reservations or questions please contact me at [phone]  Thanks, Sincerely, Juan Nguyen PT      We had the pleasure of evaluating the following patient for physical therapy services at [facility]. A summary of our findings can be found in the initial assessment below. This includes our plan of care. If you have any questions or concerns regarding these findings, please do not hesitate to contact me at the office phone number checked above. Thank you for the referral.       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   [] Yes, Patient intake triggered C-SSRS Screening      [] C-SSRS Screening completed  [] PCP notified via Epic     SUBJECTIVE: Patient is a 70 y/o female with a hx of left heel pain since Dec 2021. She had a left ankle fx with orif in 2015. She c/o constant aching pain in her left achilles which is sharp at times andrew when going down steps. She has been dx with a bone spur and Haglands deformity. She also has left hip pain and lbp. 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  She is active in general.      Relevant Medical History:Additional Pertinent Hx: htn, left ankle fx with orif in 2015, Merrill's deformity, leukopenia  Functional Outcome Measure: WOMAC  = 13    Pain Scale: 1-5/10  Easing factors: rest  Provocative factors: weight bearing     Type: [x]Constant   []Intermittent  []Radiating []Localized []other:     Numbness/Tingling: denies    Occupation/School:retired    Living Status/Prior Level of Function: Independent with ADLs and IADLs,     OBJECTIVE:     Posture: ant tilt, increased lumbar lordosis, er of left le at rest, flat feet bilat    Functional Mobility/Transfers: ind    Palpation: tight and tender from piriformis and glut med to plantar fascia, add, itb, post tib, ant tib, soleus, decreased 1 st ray jnt mob, gastroc        Gait: - ambulates with er of left le, decreased heel strike, stance, push off,     ROM LEFT RIGHT   HIP Flex 100 Wfl all    HIP Abd 25    HIP Ext 5    HIP IR 30    HIP ER 40    Knee ext -5    Knee Flex 120    Ankle PF 30    Ankle DF 5    Ankle In 25    Ankle Ev 10    Strength  LEFT RIGHT   HIP Flexors 4 5/5 all   HIP Abductors 4    HIP Ext 4    Hip ER 4    Knee EXT (quad) 4    Knee Flex (HS) 4    Ankle DF 4    Ankle PF 4    Ankle Inv 4    Ankle EV 4         Circumference  Mid apex  7 cm prox             Reflexes/Sensation:    [x]Dermatomes/Myotomes intact    [x]Reflexes equal and normal bilaterally   []Other:    Joint mobility:    []Normal    [x]Hypo   []Hyper    Orthopedic Special Tests: drawer test neg [x] Patient history, allergies, meds reviewed. Medical chart reviewed. See intake form. Review Of Systems (ROS):  [x]Performed Review of systems (Integumentary, CardioPulmonary, Neurological) by intake and observation. Intake form has been scanned into medical record. Patient has been instructed to contact their primary care physician regarding ROS issues if not already being addressed at this time. Co-morbidities/Complexities (which will affect course of rehabilitation):   []None           Arthritic conditions   []Rheumatoid arthritis (M05.9)  [x]Osteoarthritis (M19.91)   Cardiovascular conditions   [x]Hypertension (I10)  []Hyperlipidemia (E78.5)  []Angina pectoris (I20)  []Atherosclerosis (I70)  []CVA Musculoskeletal conditions   []Disc pathology   []Congenital spine pathologies   []Prior surgical intervention  []Osteoporosis (M81.8)  []Osteopenia (M85.8)   Endocrine conditions   []Hypothyroid (E03.9)  []Hyperthyroid Gastrointestinal conditions   []Constipation (Q83.77)   Metabolic conditions   []Morbid obesity (E66.01)  []Diabetes type 1(E10.65) or 2 (E11.65)   []Neuropathy (G60.9)     Pulmonary conditions   []Asthma (J45)  []Coughing   []COPD (J44.9)   Psychological Disorders  []Anxiety (F41.9)  []Depression (F32.9)   []Other:   [x]Other:   2015 ankle fx with orif, leukopenia       Barriers to/and or personal factors that will affect rehab potential:              []Age  []Sex    []Smoker              []Motivation/Lack of Motivation                        [x]Co-Morbidities              []Cognitive Function, education/learning barriers              []Environmental, home barriers              []profession/work barriers  [x]past PT/medical experience  []other  Justification:     Falls Risk Assessment (30 days):   [x] Falls Risk assessed and no intervention required.   [] Falls Risk assessed and Patient requires intervention due to being higher risk   TUG score (>12s at risk):     [] Falls education provided, including         ASSESSMENT:   Functional Impairments:     [x]Noted lumbar/proximal hip/LE hypomobility   [x]Decreased LE functional ROM   [x]Decreased core/proximal hip strength and neuromuscular control   [x]Decreased LE functional strength   [x]Reduced balance/proprioceptive control   []other:      Functional Activity Limitations (from functional questionnaire and intake)   []Reduced ability to tolerate prolonged functional positions   [x]Reduced ability or difficulty with changes of positions or transfers between positions   [x]Reduced ability to maintain good posture and demonstrate good body mechanics with sitting, bending, and lifting   [x]Reduced ability to sleep   [x] Reduced ability or tolerance with driving and/or computer work   [x]Reduced ability to perform lifting, carrying tasks   [x]Reduced ability to squat   [x]Reduced ability to forward bend   [x]Reduced ability to ambulate prolonged functional periods/distances/surfaces   [x]Reduced ability to ascend/descend stairs   [x]Reduced ability to run, hop or jump   []other:     Participation Restrictions   [x]Reduced participation in self care activities   [x]Reduced participation in home management activities   [x]Reduced participation in work activities   [x]Reduced participation in social activities. [x]Reduced participation in sport activities. Classification :    []Signs/symptoms consistent with post-surgical status including decreased ROM, strength and function.    []Signs/symptoms consistent with joint sprain/strain   []Signs/symptoms consistent with patella-femoral syndrome   []Signs/symptoms consistent with knee OA/hip OA   []Signs/symptoms consistent with internal derangement of knee/Hip   []Signs/symptoms consistent with functional hip weakness/NMR control      [x]Signs/symptoms consistent with tendinitis/tendinosis    []signs/symptoms consistent with pathology which may benefit from Dry needling      [x]other:  Merrill's deformity, [place]'s tendinitis    Prognosis/Rehab Potential:      []Excellent   [x]Good    []Fair   []Poor    Tolerance of evaluation/treatment:    []Excellent   [x]Good    []Fair   []Poor    Physical Therapy Evaluation Complexity Justification  [x] A history of present problem with:  [] no personal factors and/or comorbidities that impact the plan of care;  [x]1-2 personal factors and/or comorbidities that impact the plan of care  []3 personal factors and/or comorbidities that impact the plan of care  [x] An examination of body systems using standardized tests and measures addressing any of the following: body structures and functions (impairments), activity limitations, and/or participation restrictions;:  [] a total of 1-2 or more elements   [] a total of 3 or more elements   [x] a total of 4 or more elements   [x] A clinical presentation with:  [] stable and/or uncomplicated characteristics   [x] evolving clinical presentation with changing characteristics  [] unstable and unpredictable characteristics;   [x] Clinical decision making of [x] low, [] moderate, [] high complexity using standardized patient assessment instrument and/or measurable assessment of functional outcome. [] EVAL (LOW) 18581 (typically 20 minutes face-to-face)  [] EVAL (MOD) 51672 (typically 30 minutes face-to-face)  [] EVAL (HIGH) 91388 (typically 45 minutes face-to-face)  [] RE-EVAL     PLAN:  Frequency/Duration:  2 days per week for 8 Weeks:  Interventions:  []  Therapeutic exercise including: strength training, ROM, for Lower extremity and core   []  NMR activation and proprioception for LE, Glutes and Core   []  Manual therapy as indicated for LE, Hip and spine to include: Dry Needling/IASTM, STM, PROM, Gr I-IV mobilizations, manipulation.    [] Modalities as needed that may include: thermal agents, E-stim, Biofeedback, US, iontophoresis as indicated  [] Patient education on joint protection, postural re-education, activity modification, progression of HEP. HEP instruction: (see scanned forms)    GOALS:  Patient stated goal: \" I want to walk with less pain \"  [] Progressing: [] Met: [] Not Met: [] Adjusted    Therapist goals for Patient:   Short Term Goals: To be achieved in: 2 weeks  1. Independent in HEP and progression per patient tolerance, in order to prevent re-injury. [] Progressing: [] Met: [] Not Met: [] Adjusted  2. Patient will have a decrease in pain to facilitate improvement in movement, function, and ADLs as indicated by Functional Deficits. [] Progressing: [] Met: [] Not Met: [] Adjusted    Long Term Goals: To be achieved in: 8 weeks  1. Increase FOTO functional outcome score from 13 to 10  to assist with reaching prior level of function. [] Progressing: [] Met: [] Not Met: [] Adjusted  2. Patient will demonstrate increased AROM to wfl to allow for proper joint functioning as indicated by patients Functional Deficits. [] Progressing: [] Met: [] Not Met: [] Adjusted  3. Patient will demonstrate an increase in Strength to good proximal hip strength and control, within 5lb HHD in LE to allow for proper functional mobility as indicated by patients Functional Deficits. [] Progressing: [] Met: [] Not Met: [] Adjusted  4. Patient will return to walking, adl's functional activities without increased symptoms or restriction. [] Progressing: [] Met: [] Not Met: [] Adjusted  5. (patient specific functional goal)    [] Progressing: [] Met: [] Not Met: [] Adjusted     Electronically signed by:  Carmen Joseph PT      Note: If patient does not return for scheduled/recommended follow up visits, this note will serve as a discharge from care along with the most recent update on progress.

## 2023-02-06 DIAGNOSIS — M76.62 ACHILLES TENDINITIS OF BOTH LOWER EXTREMITIES: ICD-10-CM

## 2023-02-06 DIAGNOSIS — M76.61 ACHILLES TENDINITIS OF BOTH LOWER EXTREMITIES: ICD-10-CM

## 2023-02-06 RX ORDER — MELOXICAM 7.5 MG/1
TABLET ORAL
Qty: 60 TABLET | Refills: 0 | Status: SHIPPED | OUTPATIENT
Start: 2023-02-06

## 2023-02-06 NOTE — TELEPHONE ENCOUNTER
Last office visit : 12/01/2022    Future Appointments   Date Time Provider Leia Palacio   2/7/2023  2:00 PM Rise , PT [facility] PT [facility]   2/9/2023  9:30 AM Rise , PT [facility] PT [facility]   2/10/2023  9:[place] [id number]   2/14/2023  9:30 AM Rise , PT [facility] PT [facility]   2/16/2023  9:30 AM Rise , PT [facility] OP PT [facility]   6/6/2023  9:00 AM Christina Navarro MD [facility] Melissa MELGAR

## 2023-02-07 ENCOUNTER — HOSPITAL ENCOUNTER (OUTPATIENT)
Dept: PHYSICAL THERAPY | Age: 68
Setting detail: THERAPIES SERIES
Discharge: HOME | End: 2023-02-07
Payer: MEDICARE

## 2023-02-07 PROCEDURE — 97110 THERAPEUTIC EXERCISES: CPT

## 2023-02-07 PROCEDURE — 97161 PT EVAL LOW COMPLEX 20 MIN: CPT

## 2023-02-07 PROCEDURE — 97033 APP MDLTY 1+IONTPHRSIS EA 15: CPT

## 2023-02-07 NOTE — FLOWSHEET NOTE
[address] [facility]. 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]  Treatment Diagnosis: decreased ability to ambulate and function    Insurance/Certification information:  PT Insurance Information: medicare  Physician Information:  Rika Espino MD  Plan of care signed (Y/N): routed    Date of Patient follow up with Physician:      Progress Report: []  Yes  [x]  No     Date Range for reporting period:  Beginnin2023  Ending:     Progress report due (10 Rx/or 30 days whichever is less): 26     Recertification due (POC duration/ or 90 days whichever is less):      Visit # Insurance/POC Allowable Auth Needed    Wisam Yeung []Yes    []No       Latex Allergy:  [x]NO      []YES  Preferred Language for Healthcare:   [x]English       []Other:    Functional Scale:      Date assessed: at eval  Test: FOTO-13  Score:    Pain level:  2-5/10     History of Injury:Patient is a 68 y/o female with a hx of left heel pain since Dec 2021. She had a left ankle fx with orif in . She c/o constant aching pain in her left achilles which is sharp at times andrew when going down steps. She has been dx with a bone spur and Haglands deformity. She also has left hip pain and lbp. 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  She is active in general.      SUBJECTIVE:  Pt states, \" I have had heel pain for over a year \"    OBJECTIVE:      ROM LEFT RIGHT   HIP Flex 100 Wfl all    HIP Abd 25 HIP Ext 5     HIP IR 30     HIP ER 40     Knee ext -5     Knee Flex 120     Ankle PF 30     Ankle DF 5     Ankle In 25     Ankle Ev 10     Strength  LEFT RIGHT   HIP Flexors 4 5/5 all   HIP Abductors 4     HIP Ext 4     Hip ER 4     Knee EXT (quad) 4     Knee Flex (HS) 4     Ankle DF 4     Ankle PF 4     Ankle Inv 4     Ankle EV 4        RESTRICTIONS/PRECAUTIONS:     Exercises/Interventions:     Therapeutic Ex (89385)   Min: Reps/Resistance Notes/CUES   Recumbent bike     Bilat ever     Soleus stretch     Great toe ext                    Manual Intervention (22285)  Min:15 Mfr to itb, gluts, piriformis, gastroc, lat calc, peroneals, ant tib, great toe prom for df/ext, prom all ankle ranges    Knee mobs/PROM     Tib/Fem Mobs     Patella Mobs     Ankle mobs               NMR re-education (60836)  Min:  CUES NEEDED   wobble     sls                                                  Therapeutic Activity (43754)  Min: Discussed mechanism of injury, anatomy, physiology, biomechanics, sleeping positions,  and strategies to accelerate the healing process. Answered all of patients questions regarding injury. Gave necessary information to get any equipment needed. Modalities  Min: Iontophoresis to left lat heel with to go patch, 2.5 ml     IFC with      CP after exercises     MH after exercises            Other Therapeutic Activities: Pt was educated on PT POC, Diagnosis, Prognosis, pathomechanics as well as frequency and duration of scheduling future physical therapy appointments. Time was also taken on this day to answer all patient questions and participation in PT. Reviewed appointment policy in detail with patient and patient verbalized understanding. Home Exercise Program: Patient was instructed in the following for HEP:     . Patient verbalized/demonstrated understanding and was issued written handout. Access Code: [email or web address]  URL: [email or web address]  Date: 02/07/2023  Prepared by: Galina Fletcher Cuellar    Exercises  Supine Sciatic Nerve Glide - 1 x daily - 7 x weekly - 3 sets - 10 reps  Supine Piriformis Stretch with Foot on Ground - 1 x daily - 7 x weekly - 3 sets - 10 reps  Long Sitting Calf Stretch with Strap - 1 x daily - 7 x weekly - 1 sets - 3 reps - 60 hold  Seated Great Toe Extension - 1 x daily - 7 x weekly - 3 sets - 10 reps      Therapeutic Exercise and NMR EXR  [] (98835) Provided verbal/tactile cueing for activities related to strengthening, flexibility, endurance, ROM for improvements in LE, proximal hip, and core control with self care, mobility, lifting, ambulation.  [] (07378) Provided verbal/tactile cueing for activities related to improving balance, coordination, kinesthetic sense, posture, motor skill, proprioception  to assist with LE, proximal hip, and core control in self care, mobility, lifting, ambulation and eccentric single leg control.      NMR and Therapeutic Activities:    [] (31670 or 48118) Provided verbal/tactile cueing for activities related to improving balance, coordination, kinesthetic sense, posture, motor skill, proprioception and motor activation to allow for proper function of core, proximal hip and LE with self care and ADLs and functional mobility.   [] (65498) Gait Re-education- Provided training and instruction to the patient for proper LE, core and proximal hip recruitment and positioning and eccentric body weight control with ambulation re-education including up and down stairs     Home Exercise Program:    [x] (41456) Reviewed/Progressed HEP activities related to strengthening, flexibility, endurance, ROM of core, proximal hip and LE for functional self-care, mobility, lifting and ambulation/stair navigation   [] (04227)Reviewed/Progressed HEP activities related to improving balance, coordination, kinesthetic sense, posture, motor skill, proprioception of core, proximal hip and LE for self care, mobility, lifting, and ambulation/stair navigation      Manual Treatments:  PROM / STM / Oscillations-Mobs:  G-I, II, III, IV (PA's, Inf., Post.)  [] (53911) Provided manual therapy to mobilize LE, proximal hip and/or LS spine soft tissue/joints for the purpose of modulating pain, promoting relaxation,  increasing ROM, reducing/eliminating soft tissue swelling/inflammation/restriction, improving soft tissue extensibility and allowing for proper ROM for normal function with self care, mobility, lifting and ambulation. If Health system Please Indicate Time In/Out  CPT Code Time in Time out                         Approval Dates:  CPT Code Units Approved Units Used  Date Updated:                     Charges:  Timed Code Treatment Minutes: 30   Total Treatment Minutes: 50      [x] EVAL (LOW) 34377 (typically 20 minutes face-to-face)  [] EVAL (MOD) 78286 (typically 30 minutes face-to-face)  [] EVAL (HIGH) 63811 (typically 45 minutes face-to-face)  [] RE-EVAL     [x] CF(66517) x     [] Dry needle 1 or 2 Muscles (44668)  [] NMR (27818) x     [] Dry needle 3+ Muscles (59329)  [] Manual (78985) x     [] Ultrasound (81383) x  [] TA (64881) x     [] Mech Traction (70795)  [] ES(attended) (14867)     [] ES (un) (92869):   [] Vasopump (15434) [x] Ionto (80332)   [] Other:    GOALs   GOALS:  Patient stated goal: \" I want to walk with less pain \"  [] Progressing: [] Met: [] Not Met: [] Adjusted     Therapist goals for Patient:   Short Term Goals: To be achieved in: 2 weeks  1. Independent in HEP and progression per patient tolerance, in order to prevent re-injury. [] Progressing: [] Met: [] Not Met: [] Adjusted  2. Patient will have a decrease in pain to facilitate improvement in movement, function, and ADLs as indicated by Functional Deficits. [] Progressing: [] Met: [] Not Met: [] Adjusted     Long Term Goals: To be achieved in: 8 weeks  1. Increase FOTO functional outcome score from 13 to 10  to assist with reaching prior level of function.    [] Progressing: [] Met: [] Not Met: [] Adjusted  2. Patient will demonstrate increased AROM to wfl to allow for proper joint functioning as indicated by patients Functional Deficits. [] Progressing: [] Met: [] Not Met: [] Adjusted  3. Patient will demonstrate an increase in Strength to good proximal hip strength and control, within 5lb HHD in LE to allow for proper functional mobility as indicated by patients Functional Deficits. [] Progressing: [] Met: [] Not Met: [] Adjusted  4. Patient will return to walking, adl's functional activities without increased symptoms or restriction. [] Progressing: [] Met: [] Not Met: [] Adjusted  5. (patient specific functional goal)    [] Progressing: [] Met: [] Not Met: [] Adjusted            ASSESSMENT:  See eval    Treatment/Activity Tolerance:  [x] Patient tolerated treatment well [] Patient limited by fatique  [] Patient limited by pain  [] Patient limited by other medical complications  [] Other:     Overall Progression Towards Functional goals/ Treatment Progress Update:  [] Patient is progressing as expected towards functional goals listed. [] Progression is slowed due to complexities/Impairments listed. [] Progression has been slowed due to co-morbidities. [x] Plan just implemented, too soon to assess goals progression <30days   [] Goals require adjustment due to lack of progress  [] Patient is not progressing as expected and requires additional follow up with physician  [] Other    Prognosis for POC: [x] Good [] Fair  [] Poor    Patient requires continued skilled intervention: [x] Yes  [] No        PLAN: LE arom, prom  strength, proprioception, gait, balance, functional activities. Mfr, joint mobs, mods as needed, hep.  Progress as tolerated, very tight from gluts/piriformis itb, add, gastroc, peroneals, plantar fascia, , ionto, tp release, mat dn    [] Continue per plan of care [] Alter current plan (see comments)  [x] Plan of care initiated [] Hold pending MD visit [] Discharge    Electronically signed by: Nora Curtis PT    Note: If patient does not return for scheduled/recommended follow up visits, this note will serve as a discharge from care along with the most recent update on progress.

## 2023-02-09 ENCOUNTER — HOSPITAL ENCOUNTER (OUTPATIENT)
Dept: PHYSICAL THERAPY | Age: 68
Setting detail: THERAPIES SERIES
Discharge: HOME | End: 2023-02-09
Payer: MEDICARE

## 2023-02-09 PROCEDURE — 97033 APP MDLTY 1+IONTPHRSIS EA 15: CPT

## 2023-02-09 PROCEDURE — 97140 MANUAL THERAPY 1/> REGIONS: CPT

## 2023-02-09 PROCEDURE — 97110 THERAPEUTIC EXERCISES: CPT

## 2023-02-09 NOTE — FLOWSHEET NOTE
[address] [facility] 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]  Treatment Diagnosis: decreased ability to ambulate and function    Insurance/Certification information:  PT Insurance Information: medicare  Physician Information:  Leonardo Montesinos MD  Plan of care signed (Y/N): routed    Date of Patient follow up with Physician:      Progress Report: []  Yes  [x]  No     Date Range for reporting period:  Beginnin2023  Ending:     Progress report due (10 Rx/or 30 days whichever is less): 07     Recertification due (POC duration/ or 90 days whichever is less):      Visit # Insurance/POC Allowable Auth Needed    AOI Medical Loges []Yes    []No       Latex Allergy:  [x]NO      []YES  Preferred Language for Healthcare:   [x]English       []Other:    Functional Scale:      Date assessed: at eval  Test: FOTO-13  Score:    Pain level:  2-5/10     History of Injury:Patient is a 70 y/o female with a hx of left heel pain since Dec 2021. She had a left ankle fx with orif in . She c/o constant aching pain in her left achilles which is sharp at times andrew when going down steps. She has been dx with a bone spur and Haglands deformity. She also has left hip pain and lbp. 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  She is active in general.      SUBJECTIVE:  Pt states, \" I have had heel pain for over a year \"  23  Pt states, \" I have only felt the pain a time or 2 since last rx \"    OBJECTIVE:      ROM LEFT RIGHT   HIP Flex 100 Wfl all    HIP Abd 25     HIP Ext 5     HIP IR 30     HIP ER 40     Knee ext -5     Knee Flex 120     Ankle PF 30     Ankle DF 5     Ankle In 25     Ankle Ev 10     Strength  LEFT RIGHT   HIP Flexors 4 5/5 all   HIP Abductors 4     HIP Ext 4     Hip ER 4     Knee EXT (quad) 4     Knee Flex (HS) 4     Ankle DF 4     Ankle PF 4     Ankle Inv 4     Ankle EV 4        RESTRICTIONS/PRECAUTIONS:     Exercises/Interventions:     Therapeutic Ex (01885)   Min: Reps/Resistance Notes/CUES   HEP Reviewed and added on 2/9/23    Recumbent bike X 5 min    Bilat ever     Soleus stretch 30 x 3    Great toe ext 30 x 3    Toe extensor stretch 60 x 2    crossover 30 x3         Manual Intervention (42970)  Min:30 Mfr to itb, gluts, piriformis, gastroc, lat calc, peroneals, ant tib, great toe prom for df/ext, prom all ankle ranges, great toe prom, jnt mobs gr 3    Knee mobs/PROM     Tib/Fem Mobs     Patella Mobs     Ankle mobs               NMR re-education (71257)  Min:  CUES NEEDED   wobble     sls     Monster walk                                             Therapeutic Activity (29793)  Min: Discussed mechanism of injury, anatomy, physiology, biomechanics, sleeping positions,  and strategies to accelerate the healing process. Answered all of patients questions regarding injury. Gave necessary information to get any equipment needed. Modalities  Min: Iontophoresis to left lat heel with to go patch, 2.5 ml     IFC with      CP after exercises     MH after exercises            Other Therapeutic Activities: Pt was educated on PT POC, Diagnosis, Prognosis, pathomechanics as well as frequency and duration of scheduling future physical therapy appointments. Time was also taken on this day to answer all patient questions and participation in PT. Reviewed appointment policy in detail with patient and patient verbalized understanding.      Home Exercise Program: Patient was instructed in the following for HEP:     . Patient verbalized/demonstrated understanding and was issued written handout. Access Code: [email or web address]  URL: [email or web address]  Date: 02/07/2023  Prepared by: Kimberlee Jimenes    Exercises  Supine Sciatic Nerve Glide - 1 x daily - 7 x weekly - 3 sets - 10 reps  Supine Piriformis Stretch with Foot on Ground - 1 x daily - 7 x weekly - 3 sets - 10 reps  Long Sitting Calf Stretch with Strap - 1 x daily - 7 x weekly - 1 sets - 3 reps - 60 hold  Seated Great Toe Extension - 1 x daily - 7 x weekly - 3 sets - 10 reps  Access Code: [email or web address]  URL: [email or web address]  Date: 02/09/2023  Prepared by: Kimberlee Jimenes    Exercises  Standing Ankle Dorsiflexor Toe Extensor Stretch - 1 x daily - 7 x weekly - 3 sets - 10 reps  Seated Toe Flexion Extension PROM - 1 x daily - 7 x weekly - 3 sets - 10 reps      Therapeutic Exercise and NMR EXR  [] (27012) Provided verbal/tactile cueing for activities related to strengthening, flexibility, endurance, ROM for improvements in LE, proximal hip, and core control with self care, mobility, lifting, ambulation.  [] (06358) Provided verbal/tactile cueing for activities related to improving balance, coordination, kinesthetic sense, posture, motor skill, proprioception  to assist with LE, proximal hip, and core control in self care, mobility, lifting, ambulation and eccentric single leg control.      NMR and Therapeutic Activities:    [] (47853 or 48191) Provided verbal/tactile cueing for activities related to improving balance, coordination, kinesthetic sense, posture, motor skill, proprioception and motor activation to allow for proper function of core, proximal hip and LE with self care and ADLs and functional mobility.   [] (20561) Gait Re-education- Provided training and instruction to the patient for proper LE, core and proximal hip recruitment and positioning and eccentric body weight control with ambulation re-education including up and down stairs     Home Exercise Program:    [x] (05406) Reviewed/Progressed HEP activities related to strengthening, flexibility, endurance, ROM of core, proximal hip and LE for functional self-care, mobility, lifting and ambulation/stair navigation   [] (08041)Reviewed/Progressed HEP activities related to improving balance, coordination, kinesthetic sense, posture, motor skill, proprioception of core, proximal hip and LE for self care, mobility, lifting, and ambulation/stair navigation      Manual Treatments:  PROM / STM / Oscillations-Mobs:  G-I, II, III, IV (PA's, Inf., Post.)  [] (89965) Provided manual therapy to mobilize LE, proximal hip and/or LS spine soft tissue/joints for the purpose of modulating pain, promoting relaxation,  increasing ROM, reducing/eliminating soft tissue swelling/inflammation/restriction, improving soft tissue extensibility and allowing for proper ROM for normal function with self care, mobility, lifting and ambulation. If [facility] Please Indicate Time In/Out  CPT Code Time in Time out                         Approval Dates:  CPT Code Units Approved Units Used  Date Updated:                     Charges:  Timed Code Treatment Minutes: 45   Total Treatment Minutes: 55      [] EVAL (LOW) 28820 (typically 20 minutes face-to-face)  [] EVAL (MOD) 94369 (typically 30 minutes face-to-face)  [] EVAL (HIGH) 01398 (typically 45 minutes face-to-face)  [] RE-EVAL     [x] TP(50503) x  1   [] Dry needle 1 or 2 Muscles (85018)  [] NMR (50954) x     [] Dry needle 3+ Muscles (50490)  [x] Manual (63645) x  2   [] Ultrasound (27349) x  [] TA (22574) x     [] Mech Traction (93458)  [] ES(attended) (69190)     [] ES (un) (14525):   [] Vasopump (60391) [x] Ionto (89378)   [] Other:    GOALs   GOALS:  Patient stated goal: \" I want to walk with less pain \"  [] Progressing: [] Met: [] Not Met: [] Adjusted     Therapist goals for Patient:   Short Term Goals: To be achieved in: 2 weeks  1.  Independent in HEP and progression per patient tolerance, in order to prevent re-injury. [] Progressing: [] Met: [] Not Met: [] Adjusted  2. Patient will have a decrease in pain to facilitate improvement in movement, function, and ADLs as indicated by Functional Deficits. [] Progressing: [] Met: [] Not Met: [] Adjusted     Long Term Goals: To be achieved in: 8 weeks  1. Increase FOTO functional outcome score from 13 to 10  to assist with reaching prior level of function. [] Progressing: [] Met: [] Not Met: [] Adjusted  2. Patient will demonstrate increased AROM to wfl to allow for proper joint functioning as indicated by patients Functional Deficits. [] Progressing: [] Met: [] Not Met: [] Adjusted  3. Patient will demonstrate an increase in Strength to good proximal hip strength and control, within 5lb HHD in LE to allow for proper functional mobility as indicated by patients Functional Deficits. [] Progressing: [] Met: [] Not Met: [] Adjusted  4. Patient will return to walking, adl's functional activities without increased symptoms or restriction. [] Progressing: [] Met: [] Not Met: [] Adjusted  5. (patient specific functional goal)    [] Progressing: [] Met: [] Not Met: [] Adjusted            ASSESSMENT:  See eval    Treatment/Activity Tolerance:  [x] Patient tolerated treatment well [] Patient limited by fatique  [] Patient limited by pain  [] Patient limited by other medical complications  [] Other:     Overall Progression Towards Functional goals/ Treatment Progress Update:  [] Patient is progressing as expected towards functional goals listed. [] Progression is slowed due to complexities/Impairments listed. [] Progression has been slowed due to co-morbidities.   [x] Plan just implemented, too soon to assess goals progression <30days   [] Goals require adjustment due to lack of progress  [] Patient is not progressing as expected and requires additional follow up with physician  [] Other    Prognosis for POC: [x] Good [] Fair  [] Poor    Patient requires continued skilled intervention: [x] Yes  [] No        PLAN: LE arom, prom  strength, proprioception, gait, balance, functional activities. Mfr, joint mobs, mods as needed, hep. Progress as tolerated, very tight from gluts/piriformis itb, add, gastroc, peroneals, plantar fascia, , ionto, tp release, mat dn    [] Continue per plan of care [] Alter current plan (see comments)  [x] Plan of care initiated [] Hold pending MD visit [] Discharge    Electronically signed by: Mohit Louise PT    Note: If patient does not return for scheduled/recommended follow up visits, this note will serve as a discharge from care along with the most recent update on progress.

## 2023-02-10 ENCOUNTER — HOSPITAL ENCOUNTER (OUTPATIENT)
Dept: WOMENS IMAGING | Age: 68
Discharge: HOME | End: 2023-02-10
Payer: MEDICARE

## 2023-02-10 DIAGNOSIS — Z78.0 POSTMENOPAUSAL: ICD-10-CM

## 2023-02-10 PROCEDURE — 77080 DXA BONE DENSITY AXIAL: CPT

## 2023-02-14 ENCOUNTER — HOSPITAL ENCOUNTER (OUTPATIENT)
Dept: PHYSICAL THERAPY | Age: 68
Setting detail: THERAPIES SERIES
Discharge: HOME | End: 2023-02-14
Payer: MEDICARE

## 2023-02-14 PROCEDURE — 97033 APP MDLTY 1+IONTPHRSIS EA 15: CPT

## 2023-02-14 PROCEDURE — 97110 THERAPEUTIC EXERCISES: CPT

## 2023-02-14 PROCEDURE — 97140 MANUAL THERAPY 1/> REGIONS: CPT

## 2023-02-14 NOTE — FLOWSHEET NOTE
[facility] [place]. 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]  Treatment Diagnosis: decreased ability to ambulate and function    Insurance/Certification information:  PT Insurance Information: medicare  Physician Information:  Ramos Blas MD  Plan of care signed (Y/N): routed    Date of Patient follow up with Physician:      Progress Report: []  Yes  [x]  No     Date Range for reporting period:  Beginnin2023  Ending:     Progress report due (10 Rx/or 30 days whichever is less): 69     Recertification due (POC duration/ or 90 days whichever is less):      Visit # Insurance/POC Allowable Auth Needed   3 /12 Med Amy Dominik []Yes    []No       Latex Allergy:  [x]NO      []YES  Preferred Language for Healthcare:   [x]English       []Other:    Functional Scale:      Date assessed: at eval  Test: FOTO-13  Score:    Pain level:  2-5/10     History of Injury:Patient is a 68 y/o female with a hx of left heel pain since Dec 2021. She had a left ankle fx with orif in . She c/o constant aching pain in her left achilles which is sharp at times andrew when going down steps. She has been dx with a bone spur and Haglands deformity. She also has left hip pain and lbp. 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  She is active in general.      SUBJECTIVE:  Pt states, \" I have had heel pain for over a year \"  23  Pt states, \" I have only felt the pain a time or 2 since last rx \"  2/14/23  Pt states, \" I'm about the same \"    OBJECTIVE:      ROM LEFT RIGHT   HIP Flex 100 Wfl all    HIP Abd 25     HIP Ext 5     HIP IR 30     HIP ER 40     Knee ext -5     Knee Flex 120     Ankle PF 30     Ankle DF 5     Ankle In 25     Ankle Ev 10     Strength  LEFT RIGHT   HIP Flexors 4 5/5 all   HIP Abductors 4     HIP Ext 4     Hip ER 4     Knee EXT (quad) 4     Knee Flex (HS) 4     Ankle DF 4     Ankle PF 4     Ankle Inv 4     Ankle EV 4        RESTRICTIONS/PRECAUTIONS:     Exercises/Interventions:     Therapeutic Ex (18596)   Min:15 Reps/Resistance Notes/CUES   HEP Reviewed and added on 2/9/23    Recumbent bike X 5 min    Bilat ever Red x 2 min    Soleus stretch 30 x 3    Great toe ext 30 x 3    Toe extensor stretch 60 x 2    crossover 30 x3                                       Manual Intervention (68398)  Min:30 Mfr to itb, gluts, piriformis, gastroc, lat calc, peroneals, ant tib, great toe prom for df/ext, prom all ankle ranges, great toe prom, jnt mobs gr 3    Knee mobs/PROM     Tib/Fem Mobs     Patella Mobs     Ankle mobs               NMR re-education (25714)  Min:  CUES NEEDED   wobble     sls     Monster walk                                             Therapeutic Activity (39279)  Min: Discussed mechanism of injury, anatomy, physiology, biomechanics, sleeping positions,  and strategies to accelerate the healing process. Answered all of patients questions regarding injury. Gave necessary information to get any equipment needed. Modalities  Min: Iontophoresis to left lat heel with to go patch, 2.5 ml     IFC with      CP after exercises     MH after exercises            Other Therapeutic Activities: Pt was educated on PT POC, Diagnosis, Prognosis, pathomechanics as well as frequency and duration of scheduling future physical therapy appointments. Time was also taken on this day to answer all patient questions and participation in PT.  Reviewed appointment policy in detail with patient and patient verbalized understanding. Home Exercise Program: Patient was instructed in the following for HEP:     . Patient verbalized/demonstrated understanding and was issued written handout. Access Code: [email or web address]  URL: [email or web address]  Date: 02/07/2023  Prepared by: Chanel Freeman    Exercises  Supine Sciatic Nerve Glide - 1 x daily - 7 x weekly - 3 sets - 10 reps  Supine Piriformis Stretch with Foot on Ground - 1 x daily - 7 x weekly - 3 sets - 10 reps  Long Sitting Calf Stretch with Strap - 1 x daily - 7 x weekly - 1 sets - 3 reps - 60 hold  Seated Great Toe Extension - 1 x daily - 7 x weekly - 3 sets - 10 reps  Access Code: [email or web address]  URL: [email or web address]  Date: 02/09/2023  Prepared by: Chanel Freeman    Exercises  Standing Ankle Dorsiflexor Toe Extensor Stretch - 1 x daily - 7 x weekly - 3 sets - 10 reps  Seated Toe Flexion Extension PROM - 1 x daily - 7 x weekly - 3 sets - 10 reps      Therapeutic Exercise and NMR EXR  [] (50512) Provided verbal/tactile cueing for activities related to strengthening, flexibility, endurance, ROM for improvements in LE, proximal hip, and core control with self care, mobility, lifting, ambulation.  [] (20295) Provided verbal/tactile cueing for activities related to improving balance, coordination, kinesthetic sense, posture, motor skill, proprioception  to assist with LE, proximal hip, and core control in self care, mobility, lifting, ambulation and eccentric single leg control.      NMR and Therapeutic Activities:    [] (72742 or 59730) Provided verbal/tactile cueing for activities related to improving balance, coordination, kinesthetic sense, posture, motor skill, proprioception and motor activation to allow for proper function of core, proximal hip and LE with self care and ADLs and functional mobility.   [] (23575) Gait Re-education- Provided training and instruction to the patient for proper LE, core and proximal hip recruitment and positioning and eccentric body weight control with ambulation re-education including up and down stairs     Home Exercise Program:    [x] (46778) Reviewed/Progressed HEP activities related to strengthening, flexibility, endurance, ROM of core, proximal hip and LE for functional self-care, mobility, lifting and ambulation/stair navigation   [] (55914)Reviewed/Progressed HEP activities related to improving balance, coordination, kinesthetic sense, posture, motor skill, proprioception of core, proximal hip and LE for self care, mobility, lifting, and ambulation/stair navigation      Manual Treatments:  PROM / STM / Oscillations-Mobs:  G-I, II, III, IV (PA's, Inf., Post.)  [] (93359) Provided manual therapy to mobilize LE, proximal hip and/or LS spine soft tissue/joints for the purpose of modulating pain, promoting relaxation,  increasing ROM, reducing/eliminating soft tissue swelling/inflammation/restriction, improving soft tissue extensibility and allowing for proper ROM for normal function with self care, mobility, lifting and ambulation.      If [facility] Please Indicate Time In/Out  CPT Code Time in Time out                         Approval Dates:  CPT Code Units Approved Units Used  Date Updated:                     Charges:  Timed Code Treatment Minutes: 45   Total Treatment Minutes: 55      [] EVAL (LOW) 38794 (typically 20 minutes face-to-face)  [] EVAL (MOD) 12840 (typically 30 minutes face-to-face)  [] EVAL (HIGH) 11352 (typically 45 minutes face-to-face)  [] RE-EVAL     [x] JU(08337) x  1   [] Dry needle 1 or 2 Muscles (15122)  [] NMR (64710) x     [] Dry needle 3+ Muscles (14022)  [x] Manual (27541) x  2   [] Ultrasound (32212) x  [] TA (67842) x     [] Mech Traction (66428)  [] ES(attended) (10921)     [] ES (un) (08107):   [] Vasopump (37693) [x] Ionto (57361)   [] Other:    GOALs   GOALS:  Patient stated goal: \" I want to walk with less pain \"  [] Progressing: [] Met: [] Not Met: [] Adjusted     Therapist goals for Patient:   Short Term Goals: To be achieved in: 2 weeks  1. Independent in HEP and progression per patient tolerance, in order to prevent re-injury. [] Progressing: [] Met: [] Not Met: [] Adjusted  2. Patient will have a decrease in pain to facilitate improvement in movement, function, and ADLs as indicated by Functional Deficits. [] Progressing: [] Met: [] Not Met: [] Adjusted     Long Term Goals: To be achieved in: 8 weeks  1. Increase FOTO functional outcome score from 13 to 10  to assist with reaching prior level of function. [] Progressing: [] Met: [] Not Met: [] Adjusted  2. Patient will demonstrate increased AROM to wfl to allow for proper joint functioning as indicated by patients Functional Deficits. [] Progressing: [] Met: [] Not Met: [] Adjusted  3. Patient will demonstrate an increase in Strength to good proximal hip strength and control, within 5lb HHD in LE to allow for proper functional mobility as indicated by patients Functional Deficits. [] Progressing: [] Met: [] Not Met: [] Adjusted  4. Patient will return to walking, adl's functional activities without increased symptoms or restriction. [] Progressing: [] Met: [] Not Met: [] Adjusted  5. (patient specific functional goal)    [] Progressing: [] Met: [] Not Met: [] Adjusted            ASSESSMENT:  See eval    Treatment/Activity Tolerance:  [x] Patient tolerated treatment well [] Patient limited by fatique  [] Patient limited by pain  [] Patient limited by other medical complications  [] Other:     Overall Progression Towards Functional goals/ Treatment Progress Update:  [] Patient is progressing as expected towards functional goals listed. [] Progression is slowed due to complexities/Impairments listed. [] Progression has been slowed due to co-morbidities.   [x] Plan just implemented, too soon to assess goals progression <30days   [] Goals require adjustment due to lack of progress  [] Patient is not progressing as expected and requires additional follow up with physician  [] Other    Prognosis for POC: [x] Good [] Fair  [] Poor    Patient requires continued skilled intervention: [x] Yes  [] No        PLAN: LE arom, prom  strength, proprioception, gait, balance, functional activities.  Mfr, joint mobs, mods as needed, hep. Progress as tolerated, very tight from gluts/piriformis itb, add, gastroc, peroneals, plantar fascia, , ionto, tp release, mat dn    [] Continue per plan of care [] Alter current plan (see comments)  [x] Plan of care initiated [] Hold pending MD visit [] Discharge    Electronically signed by: Daren Cuellar, PT    Note: If patient does not return for scheduled/recommended follow up visits, this note will serve as a discharge from care along with the most recent update on progress.

## 2023-02-16 ENCOUNTER — HOSPITAL ENCOUNTER (OUTPATIENT)
Dept: PHYSICAL THERAPY | Age: 68
Setting detail: THERAPIES SERIES
Discharge: HOME | End: 2023-02-16
Payer: MEDICARE

## 2023-02-16 PROCEDURE — 97140 MANUAL THERAPY 1/> REGIONS: CPT

## 2023-02-16 PROCEDURE — 97110 THERAPEUTIC EXERCISES: CPT

## 2023-02-16 PROCEDURE — 97033 APP MDLTY 1+IONTPHRSIS EA 15: CPT

## 2023-02-16 PROCEDURE — 97035 APP MDLTY 1+ULTRASOUND EA 15: CPT

## 2023-02-16 NOTE — FLOWSHEET NOTE
[facility]. 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]  Treatment Diagnosis: decreased ability to ambulate and function    Insurance/Certification information:  PT Insurance Information: medicare  Physician Information:  Nate Kay MD  Plan of care signed (Y/N): routed    Date of Patient follow up with Physician:      Progress Report: []  Yes  [x]  No     Date Range for reporting period:  Beginnin2023  Ending:     Progress report due (10 Rx/or 30 days whichever is less): 95     Recertification due (POC duration/ or 90 days whichever is less):      Visit # Insurance/POC Allowable Auth Needed    [facility] []Yes    []No       Latex Allergy:  [x]NO      []YES  Preferred Language for Healthcare:   [x]English       []Other:    Functional Scale:      Date assessed: at eval  Test: FOTO-13  Score:    Pain level:  /10     History of Injury:Patient is a 70 y/o female with a hx of left heel pain since Dec 2021. She had a left ankle fx with orif in . She c/o constant aching pain in her left achilles which is sharp at times andrew when going down steps. She has been dx with a bone spur and Haglands deformity. She also has left hip pain and lbp. 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  She is active in general.      SUBJECTIVE:  Pt states, \" I have had heel pain for over a year \"  23  Pt states, \" I have only felt the pain a time or 2 since last rx \"  2/14/23  Pt states, \" I'm about the same \"  2/16/23  Pt states, \" maybe a little better \"    OBJECTIVE:      ROM LEFT RIGHT   HIP Flex 100 Wfl all    HIP Abd 25     HIP Ext 5     HIP IR 30     HIP ER 40     Knee ext -5     Knee Flex 120     Ankle PF 30     Ankle DF 5     Ankle In 25     Ankle Ev 10     Strength  LEFT RIGHT   HIP Flexors 4 5/5 all   HIP Abductors 4     HIP Ext 4     Hip ER 4     Knee EXT (quad) 4     Knee Flex (HS) 4     Ankle DF 4     Ankle PF 4     Ankle Inv 4     Ankle EV 4        RESTRICTIONS/PRECAUTIONS:     Exercises/Interventions:     Therapeutic Ex (43505)   Min:15 Reps/Resistance Notes/CUES   HEP Reviewed and added on 2/9/23    Recumbent bike X 5 min    Bilat ever Red x 2 min    Soleus stretch 30 x 3    Great toe ext 30 x 3    Toe extensor stretch 60 x 2    crossover 30 x3    bosu X 2 min march    Dd sqauts X 2 minn                             Manual Intervention (10414)  Min:15 Mfr to itb, gluts, piriformis, gastroc, lat calc, peroneals, ant tib, great toe prom for df/ext, prom all ankle ranges, great toe prom, jnt mobs gr 3    Knee mobs/PROM     Tib/Fem Mobs     Patella Mobs     Ankle mobs               NMR re-education (68445)  Min:  CUES NEEDED   wobble     sls     Monster walk                                             Therapeutic Activity (43435)  Min: Discussed mechanism of injury, anatomy, physiology, biomechanics, sleeping positions,  and strategies to accelerate the healing process. Answered all of patients questions regarding injury. Gave necessary information to get any equipment needed. Modalities  Min: Iontophoresis to left lat heel with to go patch, 2.5 ml     IFC with      CP after exercises     MH after exercises     us 1.5w/cm2 x 8 min pulsed 100% to lateral heel      Other Therapeutic Activities: Pt was educated on PT POC, Diagnosis, Prognosis, pathomechanics as well as frequency and duration of scheduling future physical therapy appointments.  Time was also taken on this day to answer all patient questions and participation in PT. Reviewed appointment policy in detail with patient and patient verbalized understanding. Home Exercise Program: Patient was instructed in the following for HEP:     . Patient verbalized/demonstrated understanding and was issued written handout. Access Code: [email or web address]  URL: [email or web address]  Date: 02/07/2023  Prepared by: Almira Spoon    Exercises  Supine Sciatic Nerve Glide - 1 x daily - 7 x weekly - 3 sets - 10 reps  Supine Piriformis Stretch with Foot on Ground - 1 x daily - 7 x weekly - 3 sets - 10 reps  Long Sitting Calf Stretch with Strap - 1 x daily - 7 x weekly - 1 sets - 3 reps - 60 hold  Seated Great Toe Extension - 1 x daily - 7 x weekly - 3 sets - 10 reps  Access Code: [email or web address]  URL: [email or web address]  Date: 02/09/2023  Prepared by: Almira Spoon    Exercises  Standing Ankle Dorsiflexor Toe Extensor Stretch - 1 x daily - 7 x weekly - 3 sets - 10 reps  Seated Toe Flexion Extension PROM - 1 x daily - 7 x weekly - 3 sets - 10 reps      Therapeutic Exercise and NMR EXR  [] (23739) Provided verbal/tactile cueing for activities related to strengthening, flexibility, endurance, ROM for improvements in LE, proximal hip, and core control with self care, mobility, lifting, ambulation.  [] (63149) Provided verbal/tactile cueing for activities related to improving balance, coordination, kinesthetic sense, posture, motor skill, proprioception  to assist with LE, proximal hip, and core control in self care, mobility, lifting, ambulation and eccentric single leg control.      NMR and Therapeutic Activities:    [] (50282 or 00517) Provided verbal/tactile cueing for activities related to improving balance, coordination, kinesthetic sense, posture, motor skill, proprioception and motor activation to allow for proper function of core, proximal hip and LE with self care and ADLs and functional mobility.   [] (83123) Gait Re-education- Provided training and instruction to the patient for proper LE, core and proximal hip recruitment and positioning and eccentric body weight control with ambulation re-education including up and down stairs     Home Exercise Program:    [x] (65945) Reviewed/Progressed HEP activities related to strengthening, flexibility, endurance, ROM of core, proximal hip and LE for functional self-care, mobility, lifting and ambulation/stair navigation   [] (72476)Reviewed/Progressed HEP activities related to improving balance, coordination, kinesthetic sense, posture, motor skill, proprioception of core, proximal hip and LE for self care, mobility, lifting, and ambulation/stair navigation      Manual Treatments:  PROM / STM / Oscillations-Mobs:  G-I, II, III, IV (PA's, Inf., Post.)  [] (67913) Provided manual therapy to mobilize LE, proximal hip and/or LS spine soft tissue/joints for the purpose of modulating pain, promoting relaxation,  increasing ROM, reducing/eliminating soft tissue swelling/inflammation/restriction, improving soft tissue extensibility and allowing for proper ROM for normal function with self care, mobility, lifting and ambulation.      If [facility] Please Indicate Time In/Out  CPT Code Time in Time out                         Approval Dates:  CPT Code Units Approved Units Used  Date Updated:                     Charges:  Timed Code Treatment Minutes: 45   Total Treatment Minutes: 55      [] EVAL (LOW) 60339 (typically 20 minutes face-to-face)  [] EVAL (MOD) 45837 (typically 30 minutes face-to-face)  [] EVAL (HIGH) 53287 (typically 45 minutes face-to-face)  [] RE-EVAL     [x] OI(97862) x  1   [] Dry needle 1 or 2 Muscles (53207)  [] NMR (83318) x     [] Dry needle 3+ Muscles (05768)  [x] Manual (56993) x 1  [x] Ultrasound (19586) x1  [] TA (41048) x     [] Mech Traction (76021)  [] ES(attended) (57586)     [] ES (un) (24783):   [] Vasopump (11594) [x] Ionto (66336)   [] Other:    GOALs   GOALS:  Patient stated goal: \" I want to walk with less pain \"  [] Progressing: [] Met: [] Not Met: [] Adjusted     Therapist goals for Patient:   Short Term Goals: To be achieved in: 2 weeks  1. Independent in HEP and progression per patient tolerance, in order to prevent re-injury. [] Progressing: [] Met: [] Not Met: [] Adjusted  2. Patient will have a decrease in pain to facilitate improvement in movement, function, and ADLs as indicated by Functional Deficits. [] Progressing: [] Met: [] Not Met: [] Adjusted     Long Term Goals: To be achieved in: 8 weeks  1. Increase FOTO functional outcome score from 13 to 10  to assist with reaching prior level of function. [] Progressing: [] Met: [] Not Met: [] Adjusted  2. Patient will demonstrate increased AROM to wfl to allow for proper joint functioning as indicated by patients Functional Deficits. [] Progressing: [] Met: [] Not Met: [] Adjusted  3. Patient will demonstrate an increase in Strength to good proximal hip strength and control, within 5lb HHD in LE to allow for proper functional mobility as indicated by patients Functional Deficits. [] Progressing: [] Met: [] Not Met: [] Adjusted  4. Patient will return to walking, adl's functional activities without increased symptoms or restriction. [] Progressing: [] Met: [] Not Met: [] Adjusted  5. (patient specific functional goal)    [] Progressing: [] Met: [] Not Met: [] Adjusted            ASSESSMENT:  See eval    Treatment/Activity Tolerance:  [x] Patient tolerated treatment well [] Patient limited by fatique  [] Patient limited by pain  [] Patient limited by other medical complications  [] Other:     Overall Progression Towards Functional goals/ Treatment Progress Update:  [] Patient is progressing as expected towards functional goals listed. [] Progression is slowed due to complexities/Impairments listed. [] Progression has been slowed due to co-morbidities.   [x] Plan just implemented, too soon to assess goals progression <30days   [] Goals require adjustment due to lack of progress  [] Patient is not progressing as expected and requires additional follow up with physician  [] Other    Prognosis for POC: [x] Good [] Fair  [] Poor    Patient requires continued skilled intervention: [x] Yes  [] No        PLAN: LE arom, prom  strength, proprioception, gait, balance, functional activities. Mfr, joint mobs, mods as needed, hep. Progress as tolerated, very tight from gluts/piriformis itb, add, gastroc, peroneals, plantar fascia, , ionto, tp release, mat dn    [] Continue per plan of care [] Alter current plan (see comments)  [x] Plan of care initiated [] Hold pending MD visit [] Discharge    Electronically signed by: Eddie Busch PT    Note: If patient does not return for scheduled/recommended follow up visits, this note will serve as a discharge from care along with the most recent update on progress.

## 2023-02-22 ENCOUNTER — HOSPITAL ENCOUNTER (OUTPATIENT)
Dept: PHYSICAL THERAPY | Age: 68
Setting detail: THERAPIES SERIES
Discharge: HOME | End: 2023-02-22
Payer: MEDICARE

## 2023-02-22 ENCOUNTER — TELEPHONE (OUTPATIENT)
Dept: INTERNAL MEDICINE CLINIC | Age: 68
End: 2023-02-22

## 2023-02-22 DIAGNOSIS — M81.0 AGE RELATED OSTEOPOROSIS, UNSPECIFIED PATHOLOGICAL FRACTURE PRESENCE: Primary | ICD-10-CM

## 2023-02-22 PROCEDURE — 97140 MANUAL THERAPY 1/> REGIONS: CPT

## 2023-02-22 PROCEDURE — 97033 APP MDLTY 1+IONTPHRSIS EA 15: CPT

## 2023-02-22 PROCEDURE — 97035 APP MDLTY 1+ULTRASOUND EA 15: CPT

## 2023-02-22 PROCEDURE — 97110 THERAPEUTIC EXERCISES: CPT

## 2023-02-22 RX ORDER — ALENDRONATE SODIUM 70 MG/1
70 TABLET ORAL
Qty: 12 TABLET | Refills: 1 | Status: SHIPPED | OUTPATIENT
Start: 2023-02-22

## 2023-02-22 NOTE — FLOWSHEET NOTE
[facility]. 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]  Treatment Diagnosis: decreased ability to ambulate and function    Insurance/Certification information:  PT Insurance Information: medicare  Physician Information:  Caden Perry MD  Plan of care signed (Y/N): routed    Date of Patient follow up with Physician:      Progress Report: []  Yes  [x]  No     Date Range for reporting period:  Beginnin2023  Ending:     Progress report due (10 Rx/or 30 days whichever is less): 44     Recertification due (POC duration/ or 90 days whichever is less):      Visit # Insurance/POC Allowable Auth Needed    [facility] []Yes    []No       Latex Allergy:  [x]NO      []YES  Preferred Language for Healthcare:   [x]English       []Other:    Functional Scale:      Date assessed: at eval  Test: FOTO-13  Score:    Pain level:  4/10     History of Injury:Patient is a 68 y/o female with a hx of left heel pain since Dec 2021. She had a left ankle fx with orif in . She c/o constant aching pain in her left achilles which is sharp at times andrew when going down steps. She has been dx with a bone spur and Haglands deformity. She also has left hip pain and lbp. 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  She is active in general.      SUBJECTIVE:  Pt states, \" I have had heel pain for over a year \"  23  Pt states, \" I have only felt the pain a time or 2 since last rx \"  2/14/23  Pt states, \" I'm about the same \"  2/16/23  Pt states, \" maybe a little better \"  2/22/23 Pt states, not getting the sharp as often \"    OBJECTIVE:      ROM LEFT RIGHT   HIP Flex 100 Wfl all    HIP Abd 25     HIP Ext 5     HIP IR 30     HIP ER 40     Knee ext -5     Knee Flex 120     Ankle PF 30     Ankle DF 5     Ankle In 25     Ankle Ev 10     Strength  LEFT RIGHT   HIP Flexors 4 5/5 all   HIP Abductors 4     HIP Ext 4     Hip ER 4     Knee EXT (quad) 4     Knee Flex (HS) 4     Ankle DF 4     Ankle PF 4     Ankle Inv 4     Ankle EV 4        RESTRICTIONS/PRECAUTIONS:     Exercises/Interventions:     Therapeutic Ex (50347)   Min:15 Reps/Resistance Notes/CUES   HEP Reviewed and added on 2/9/23    Recumbent bike X 5 min    Bilat ever Red x 2 min    Soleus stretch 30 x 3    Great toe ext 30 x 3    Toe extensor stretch 60 x 2    crossover 30 x3    bosu X 2 min march    Dd sqauts X 2 minn                             Manual Intervention (97029)  Min:15 Mfr to itb, gluts, piriformis, gastroc, lat calc, peroneals, ant tib, great toe prom for df/ext, prom all ankle ranges, great toe prom, jnt mobs gr 3    Knee mobs/PROM     Tib/Fem Mobs     Patella Mobs     Ankle mobs               NMR re-education (84382)  Min:  CUES NEEDED   wobble     sls X 2 min on bs    Monster walk     Heel raise ecc X 2 min                                       Therapeutic Activity (93302)  Min: Discussed mechanism of injury, anatomy, physiology, biomechanics, sleeping positions,  and strategies to accelerate the healing process. Answered all of patients questions regarding injury. Gave necessary information to get any equipment needed.                 Modalities  Min: Iontophoresis to left lat heel with to go patch, 2.5 ml     IFC with      CP after exercises     MH after exercises     us 1.5w/cm2 x 8 min pulsed 100% to lateral heel      Other Therapeutic Activities: Pt was educated on PT POC, Diagnosis, Prognosis, pathomechanics as well as frequency and duration of scheduling future physical therapy appointments. Time was also taken on this day to answer all patient questions and participation in PT. Reviewed appointment policy in detail with patient and patient verbalized understanding.     Home Exercise Program: Patient was instructed in the following for HEP:     . Patient verbalized/demonstrated understanding and was issued written handout.  Access Code: [email or web address]  URL: [email or web address]  Date: 02/07/2023  Prepared by: Jonny Cuellar    Exercises  Supine Sciatic Nerve Glide - 1 x daily - 7 x weekly - 3 sets - 10 reps  Supine Piriformis Stretch with Foot on Ground - 1 x daily - 7 x weekly - 3 sets - 10 reps  Long Sitting Calf Stretch with Strap - 1 x daily - 7 x weekly - 1 sets - 3 reps - 60 hold  Seated Great Toe Extension - 1 x daily - 7 x weekly - 3 sets - 10 reps  Access Code: [email or web address]  URL: [email or web address]  Date: 02/09/2023  Prepared by: Jonny Cuellar    Exercises  Standing Ankle Dorsiflexor Toe Extensor Stretch - 1 x daily - 7 x weekly - 3 sets - 10 reps  Seated Toe Flexion Extension PROM - 1 x daily - 7 x weekly - 3 sets - 10 reps      Therapeutic Exercise and NMR EXR  [] (55169) Provided verbal/tactile cueing for activities related to strengthening, flexibility, endurance, ROM for improvements in LE, proximal hip, and core control with self care, mobility, lifting, ambulation.  [] (95378) Provided verbal/tactile cueing for activities related to improving balance, coordination, kinesthetic sense, posture, motor skill, proprioception  to assist with LE, proximal hip, and core control in self care, mobility, lifting, ambulation and eccentric single leg control.     NMR and Therapeutic Activities:    [] (39160 or 23437) Provided verbal/tactile cueing for activities related to improving balance, coordination, kinesthetic sense, posture, motor skill, proprioception and motor activation to allow for proper function of core,  proximal hip and LE with self care and ADLs and functional mobility.   [] (65576) Gait Re-education- Provided training and instruction to the patient for proper LE, core and proximal hip recruitment and positioning and eccentric body weight control with ambulation re-education including up and down stairs     Home Exercise Program:    [x] (41677) Reviewed/Progressed HEP activities related to strengthening, flexibility, endurance, ROM of core, proximal hip and LE for functional self-care, mobility, lifting and ambulation/stair navigation   [] (53768)Reviewed/Progressed HEP activities related to improving balance, coordination, kinesthetic sense, posture, motor skill, proprioception of core, proximal hip and LE for self care, mobility, lifting, and ambulation/stair navigation      Manual Treatments:  PROM / STM / Oscillations-Mobs:  G-I, II, III, IV (PA's, Inf., Post.)  [] (94618) Provided manual therapy to mobilize LE, proximal hip and/or LS spine soft tissue/joints for the purpose of modulating pain, promoting relaxation,  increasing ROM, reducing/eliminating soft tissue swelling/inflammation/restriction, improving soft tissue extensibility and allowing for proper ROM for normal function with self care, mobility, lifting and ambulation.      If [facility] Please Indicate Time In/Out  CPT Code Time in Time out                         Approval Dates:  CPT Code Units Approved Units Used  Date Updated:                     Charges:  Timed Code Treatment Minutes: 45   Total Treatment Minutes: 55      [] EVAL (LOW) 38148 (typically 20 minutes face-to-face)  [] EVAL (MOD) 52924 (typically 30 minutes face-to-face)  [] EVAL (HIGH) 12715 (typically 45 minutes face-to-face)  [] RE-EVAL     [x] DA(81504) x  1   [] Dry needle 1 or 2 Muscles (80545)  [] NMR (82732) x     [] Dry needle 3+ Muscles (28657)  [x] Manual (19100) x 1  [x] Ultrasound (01922) x1  [] TA (94036) x     [] Mech Traction (47849)  [] ES(attended) (97179)     [] ES (un) (38357):   [] Vasopump (40822) [x] Ionto (16839)   [] Other:    GOALs   GOALS:  Patient stated goal: \" I want to walk with less pain \"  [] Progressing: [] Met: [] Not Met: [] Adjusted     Therapist goals for Patient:   Short Term Goals: To be achieved in: 2 weeks  1. Independent in HEP and progression per patient tolerance, in order to prevent re-injury. [] Progressing: [] Met: [] Not Met: [] Adjusted  2. Patient will have a decrease in pain to facilitate improvement in movement, function, and ADLs as indicated by Functional Deficits. [] Progressing: [] Met: [] Not Met: [] Adjusted     Long Term Goals: To be achieved in: 8 weeks  1. Increase FOTO functional outcome score from 13 to 10  to assist with reaching prior level of function. [] Progressing: [] Met: [] Not Met: [] Adjusted  2. Patient will demonstrate increased AROM to wfl to allow for proper joint functioning as indicated by patients Functional Deficits. [] Progressing: [] Met: [] Not Met: [] Adjusted  3. Patient will demonstrate an increase in Strength to good proximal hip strength and control, within 5lb HHD in LE to allow for proper functional mobility as indicated by patients Functional Deficits. [] Progressing: [] Met: [] Not Met: [] Adjusted  4. Patient will return to walking, adl's functional activities without increased symptoms or restriction. [] Progressing: [] Met: [] Not Met: [] Adjusted  5. (patient specific functional goal)    [] Progressing: [] Met: [] Not Met: [] Adjusted            ASSESSMENT:  See eval    Treatment/Activity Tolerance:  [x] Patient tolerated treatment well [] Patient limited by fatique  [] Patient limited by pain  [] Patient limited by other medical complications  [] Other:     Overall Progression Towards Functional goals/ Treatment Progress Update:  [] Patient is progressing as expected towards functional goals listed. [] Progression is slowed due to complexities/Impairments listed.   [] Progression has been slowed due to co-morbidities. [x] Plan just implemented, too soon to assess goals progression <30days   [] Goals require adjustment due to lack of progress  [] Patient is not progressing as expected and requires additional follow up with physician  [] Other    Prognosis for POC: [x] Good [] Fair  [] Poor    Patient requires continued skilled intervention: [x] Yes  [] No        PLAN: LE arom, prom  strength, proprioception, gait, balance, functional activities. Mfr, joint mobs, mods as needed, hep. Progress as tolerated, very tight from gluts/piriformis itb, add, gastroc, peroneals, plantar fascia, , ionto, tp release, mat dn    [] Continue per plan of care [] Alter current plan (see comments)  [x] Plan of care initiated [] Hold pending MD visit [] Discharge    Electronically signed by: Ruth Lan PT    Note: If patient does not return for scheduled/recommended follow up visits, this note will serve as a discharge from care along with the most recent update on progress.

## 2023-02-22 NOTE — TELEPHONE ENCOUNTER
----- Message from Carmita Medina MD sent at 2/22/2023 10:31 AM EST -----  Regarding: RE: osteoporosis diagnosis  Thank you. Rx sent.    ----- Message -----  From: Tristan Hagen MA  Sent: 2/22/2023  10:09 AM EST  To: Carmita Medina MD  Subject: FW: osteoporosis diagnosis                             ----- Message -----  From: Carmita Medina MD  Sent: 2/22/2023   9:40 AM EST  To: Magaly Tejeda MA, #  Subject: RE: osteoporosis diagnosis                         If she wants the oral medicine fosamax. I will be able to send it to the pharmacy    ----- Message -----  From: Magaly Tejeda MA  Sent: 2/22/2023   9:36 AM EST  To: Carmita Medina MD  Subject: FW: osteoporosis diagnosis                         ----- Message -----  From: Alexey Roe  Sent: 2/22/2023   7:18 AM EST  To: [facility] Staff  Subject: osteoporosis diagnosis                           I guess that I am ready to start on osteoporosis medication.   Should I schedule an appointment to talk about it first?

## 2023-02-24 ENCOUNTER — HOSPITAL ENCOUNTER (OUTPATIENT)
Dept: PHYSICAL THERAPY | Age: 68
Setting detail: THERAPIES SERIES
Discharge: HOME | End: 2023-02-24
Payer: MEDICARE

## 2023-02-24 PROCEDURE — 97110 THERAPEUTIC EXERCISES: CPT

## 2023-02-24 PROCEDURE — 97035 APP MDLTY 1+ULTRASOUND EA 15: CPT

## 2023-02-24 PROCEDURE — 97140 MANUAL THERAPY 1/> REGIONS: CPT

## 2023-02-24 PROCEDURE — 97033 APP MDLTY 1+IONTPHRSIS EA 15: CPT

## 2023-02-24 NOTE — FLOWSHEET NOTE
[address] [facility]. 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]  Treatment Diagnosis: decreased ability to ambulate and function    Insurance/Certification information:  PT Insurance Information: medicare  Physician Information:  Beba Lynn MD  Plan of care signed (Y/N): routed    Date of Patient follow up with Physician:      Progress Report: []  Yes  [x]  No     Date Range for reporting period:  Beginnin2023  Ending:     Progress report due (10 Rx/or 30 days whichever is less): 11     Recertification due (POC duration/ or 90 days whichever is less):      Visit # Insurance/POC Allowable Auth Needed    [facility] []Yes    []No       Latex Allergy:  [x]NO      []YES  Preferred Language for Healthcare:   [x]English       []Other:    Functional Scale:      Date assessed: at eval  Test: FOTO-13  Score:    Pain level:  4/10     History of Injury:Patient is a 70 y/o female with a hx of left heel pain since Dec 2021. She had a left ankle fx with orif in . She c/o constant aching pain in her left achilles which is sharp at times andrew when going down steps. She has been dx with a bone spur and Haglands deformity. She also has left hip pain and lbp. 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  She is active in general.      SUBJECTIVE:  Pt states, \" I have had heel pain for over a year \"  23  Pt states, \" I have only felt the pain a time or 2 since last rx \"  2/14/23  Pt states, \" I'm about the same \"  2/16/23  Pt states, \" maybe a little better \"  2/22/23 Pt states, not getting the sharp as often \"  2/24/23  Pt states, \" I'm doing better overall, still getting the sharp pain on occasion \"    OBJECTIVE:      ROM LEFT RIGHT   HIP Flex 100 Wfl all    HIP Abd 25     HIP Ext 5     HIP IR 30     HIP ER 40     Knee ext -5     Knee Flex 120     Ankle PF 30     Ankle DF 5     Ankle In 25     Ankle Ev 10     Strength  LEFT RIGHT   HIP Flexors 4 5/5 all   HIP Abductors 4     HIP Ext 4     Hip ER 4     Knee EXT (quad) 4     Knee Flex (HS) 4     Ankle DF 4     Ankle PF 4     Ankle Inv 4     Ankle EV 4        RESTRICTIONS/PRECAUTIONS:     Exercises/Interventions:     Therapeutic Ex (97756)   Min:15 Reps/Resistance Notes/CUES   HEP Reviewed and added on 2/9/23    Recumbent bike X 5 min    Bilat ever Red x 2 min    Soleus stretch 30 x 3    Great toe ext 30 x 3    Toe extensor stretch 60 x 2    crossover 30 x3    bosu X 2 min march    Dd sqauts X 2 minn                             Manual Intervention (82804)  Min:15 Mfr to itb, gluts, piriformis, gastroc, lat calc, peroneals, ant tib, great toe prom for df/ext, prom all ankle ranges, great toe prom, jnt mobs gr 3    Knee mobs/PROM     Tib/Fem Mobs     Patella Mobs     Ankle mobs               NMR re-education (97570)  Min:  CUES NEEDED   wobble     sls X 2 min on bs    Monster walk     Heel raise ecc X 2 min                                       Therapeutic Activity (91120)  Min: Discussed mechanism of injury, anatomy, physiology, biomechanics, sleeping positions,  and strategies to accelerate the healing process. Answered all of patients questions regarding injury. Gave necessary information to get any equipment needed.                 Modalities  Min: Iontophoresis to left lat heel with to go patch, 2.5 ml     IFC with      CP after exercises     MH after exercises     us 1.5w/cm2 x 8 min pulsed 100% to lateral heel      Other Therapeutic Activities: Pt was educated on PT POC, Diagnosis, Prognosis, pathomechanics as well as frequency and duration of scheduling future physical therapy appointments. Time was also taken on this day to answer all patient questions and participation in PT. Reviewed appointment policy in detail with patient and patient verbalized understanding. Home Exercise Program: Patient was instructed in the following for HEP:     . Patient verbalized/demonstrated understanding and was issued written handout. Access Code: [email or web address]  URL: [email or web address]  Date: 02/07/2023  Prepared by: Edy Mora    Exercises  Supine Sciatic Nerve Glide - 1 x daily - 7 x weekly - 3 sets - 10 reps  Supine Piriformis Stretch with Foot on Ground - 1 x daily - 7 x weekly - 3 sets - 10 reps  Long Sitting Calf Stretch with Strap - 1 x daily - 7 x weekly - 1 sets - 3 reps - 60 hold  Seated Great Toe Extension - 1 x daily - 7 x weekly - 3 sets - 10 reps  Access Code: [email or web address]  URL: [email or web address]  Date: 02/09/2023  Prepared by: Edy Mora    Exercises  Standing Ankle Dorsiflexor Toe Extensor Stretch - 1 x daily - 7 x weekly - 3 sets - 10 reps  Seated Toe Flexion Extension PROM - 1 x daily - 7 x weekly - 3 sets - 10 reps      Therapeutic Exercise and NMR EXR  [] (64084) Provided verbal/tactile cueing for activities related to strengthening, flexibility, endurance, ROM for improvements in LE, proximal hip, and core control with self care, mobility, lifting, ambulation.  [] (71393) Provided verbal/tactile cueing for activities related to improving balance, coordination, kinesthetic sense, posture, motor skill, proprioception  to assist with LE, proximal hip, and core control in self care, mobility, lifting, ambulation and eccentric single leg control.      NMR and Therapeutic Activities:    [] (22485 or 39724) Provided verbal/tactile cueing for activities related to improving balance, coordination, kinesthetic sense, posture, motor skill, proprioception and motor activation to allow for proper function of core, proximal hip and LE with self care and ADLs and functional mobility.   [] (08652) Gait Re-education- Provided training and instruction to the patient for proper LE, core and proximal hip recruitment and positioning and eccentric body weight control with ambulation re-education including up and down stairs     Home Exercise Program:    [x] (60290) Reviewed/Progressed HEP activities related to strengthening, flexibility, endurance, ROM of core, proximal hip and LE for functional self-care, mobility, lifting and ambulation/stair navigation   [] (13346)Reviewed/Progressed HEP activities related to improving balance, coordination, kinesthetic sense, posture, motor skill, proprioception of core, proximal hip and LE for self care, mobility, lifting, and ambulation/stair navigation      Manual Treatments:  PROM / STM / Oscillations-Mobs:  G-I, II, III, IV (PA's, Inf., Post.)  [] (53798) Provided manual therapy to mobilize LE, proximal hip and/or LS spine soft tissue/joints for the purpose of modulating pain, promoting relaxation,  increasing ROM, reducing/eliminating soft tissue swelling/inflammation/restriction, improving soft tissue extensibility and allowing for proper ROM for normal function with self care, mobility, lifting and ambulation.      If [facility] Please Indicate Time In/Out  CPT Code Time in Time out                         Approval Dates:  CPT Code Units Approved Units Used  Date Updated:                     Charges:  Timed Code Treatment Minutes: 45   Total Treatment Minutes: 55      [] EVAL (LOW) 52642 (typically 20 minutes face-to-face)  [] EVAL (MOD) 74198 (typically 30 minutes face-to-face)  [] EVAL (HIGH) 09425 (typically 45 minutes face-to-face)  [] RE-EVAL     [x] JR(15617) x  1   [] Dry needle 1 or 2 Muscles (01540)  [] NMR (95161) x     [] Dry needle 3+ Muscles (59280)  [x] Manual (54983) x 1  [x] Ultrasound (76669) x1  [] TA (29482) x     [] Mech Traction (23758)  [] ES(attended) (48129)     [] ES (un) (02452):   [] Vasopump (21371) [x] Ionto (21812)   [] Other:    GOALs   GOALS:  Patient stated goal: \" I want to walk with less pain \"  [] Progressing: [] Met: [] Not Met: [] Adjusted     Therapist goals for Patient:   Short Term Goals: To be achieved in: 2 weeks  1. Independent in HEP and progression per patient tolerance, in order to prevent re-injury. [] Progressing: [] Met: [] Not Met: [] Adjusted  2. Patient will have a decrease in pain to facilitate improvement in movement, function, and ADLs as indicated by Functional Deficits. [] Progressing: [] Met: [] Not Met: [] Adjusted     Long Term Goals: To be achieved in: 8 weeks  1. Increase FOTO functional outcome score from 13 to 10  to assist with reaching prior level of function. [] Progressing: [] Met: [] Not Met: [] Adjusted  2. Patient will demonstrate increased AROM to wfl to allow for proper joint functioning as indicated by patients Functional Deficits. [] Progressing: [] Met: [] Not Met: [] Adjusted  3. Patient will demonstrate an increase in Strength to good proximal hip strength and control, within 5lb HHD in LE to allow for proper functional mobility as indicated by patients Functional Deficits. [] Progressing: [] Met: [] Not Met: [] Adjusted  4. Patient will return to walking, adl's functional activities without increased symptoms or restriction.    [] Progressing: [] Met: [] Not Met: [] Adjusted  5. (patient specific functional goal)    [] Progressing: [] Met: [] Not Met: [] Adjusted            ASSESSMENT:  See eval    Treatment/Activity Tolerance:  [x] Patient tolerated treatment well [] Patient limited by fatique  [] Patient limited by pain  [] Patient limited by other medical complications  [] Other:     Overall Progression Towards Functional goals/ Treatment Progress Update:  [] Patient is progressing as expected towards functional goals listed.    [] Progression is slowed due to complexities/Impairments listed.  [] Progression has been slowed due to co-morbidities.  [x] Plan just implemented, too soon to assess goals progression <30days   [] Goals require adjustment due to lack of progress  [] Patient is not progressing as expected and requires additional follow up with physician  [] Other    Prognosis for POC: [x] Good [] Fair  [] Poor    Patient requires continued skilled intervention: [x] Yes  [] No        PLAN: LE arom, prom  strength, proprioception, gait, balance, functional activities.  Mfr, joint mobs, mods as needed, hep. Progress as tolerated, very tight from gluts/piriformis itb, add, gastroc, peroneals, plantar fascia, , ionto, tp release, mat dn    [] Continue per plan of care [] Alter current plan (see comments)  [x] Plan of care initiated [] Hold pending MD visit [] Discharge    Electronically signed by: Daren Cuellar, PT    Note: If patient does not return for scheduled/recommended follow up visits, this note will serve as a discharge from care along with the most recent update on progress.

## 2023-02-28 ENCOUNTER — HOSPITAL ENCOUNTER (OUTPATIENT)
Dept: PHYSICAL THERAPY | Age: 68
Setting detail: THERAPIES SERIES
Discharge: HOME | End: 2023-02-28
Payer: MEDICARE

## 2023-02-28 PROCEDURE — 97140 MANUAL THERAPY 1/> REGIONS: CPT

## 2023-02-28 PROCEDURE — 97110 THERAPEUTIC EXERCISES: CPT

## 2023-02-28 PROCEDURE — 97033 APP MDLTY 1+IONTPHRSIS EA 15: CPT

## 2023-02-28 PROCEDURE — 97035 APP MDLTY 1+ULTRASOUND EA 15: CPT

## 2023-02-28 NOTE — FLOWSHEET NOTE
[address] [facility]. 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]  Treatment Diagnosis: decreased ability to ambulate and function    Insurance/Certification information:  PT Insurance Information: medicare  Physician Information:  Charlotte Kelly MD  Plan of care signed (Y/N): routed    Date of Patient follow up with Physician:      Progress Report: []  Yes  [x]  No     Date Range for reporting period:  Beginnin2023  Ending:     Progress report due (10 Rx/or 30 days whichever is less): 31     Recertification due (POC duration/ or 90 days whichever is less):      Visit # Insurance/POC Allowable Auth Needed    [facility] []Yes    []No       Latex Allergy:  [x]NO      []YES  Preferred Language for Healthcare:   [x]English       []Other:    Functional Scale:      Date assessed: at eval  Test: FOTO-13  Score:    Pain level:  3-4/10     History of Injury:Patient is a 70 y/o female with a hx of left heel pain since Dec 2021. She had a left ankle fx with orif in . She c/o constant aching pain in her left achilles which is sharp at times andrew when going down steps. She has been dx with a bone spur and Haglands deformity. She also has left hip pain and lbp. 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  She is active in general.      SUBJECTIVE:  Pt states, \" I have had heel pain for over a year \"  23  Pt states, \" I have only felt the pain a time or 2 since last rx \"  2/14/23  Pt states, \" I'm about the same \"  2/16/23  Pt states, \" maybe a little better \"  2/22/23 Pt states, not getting the sharp as often \"  2/24/23  Pt states, \" I'm doing better overall, still getting the sharp pain on occasion \"  2/28/23  Pt states, \" Sore in a different area since last rx \" \" No sharp stabs \"    OBJECTIVE:      ROM LEFT RIGHT   HIP Flex 100 Wfl all    HIP Abd 25     HIP Ext 5     HIP IR 30     HIP ER 40     Knee ext -5     Knee Flex 120     Ankle PF 30     Ankle DF 5     Ankle In 25     Ankle Ev 10     Strength  LEFT RIGHT   HIP Flexors 4 5/5 all   HIP Abductors 4     HIP Ext 4     Hip ER 4     Knee EXT (quad) 4     Knee Flex (HS) 4     Ankle DF 4     Ankle PF 4     Ankle Inv 4     Ankle EV 4        RESTRICTIONS/PRECAUTIONS:     Exercises/Interventions:     Therapeutic Ex (14752)   Min:15 Reps/Resistance Notes/CUES   HEP Reviewed and added on 2/9/23    Recumbent bike X 5 min    Bilat ever Red x 2 min    Soleus stretch 30 x 3    Great toe ext 30 x 3    Toe extensor stretch 60 x 2    crossover 30 x3    bosu X 2 min march    Dd sqauts X 2 minn                             Manual Intervention (65065)  Min:15 Mfr to itb, gluts, piriformis, gastroc, lat calc, peroneals, ant tib, great toe prom for df/ext, prom all ankle ranges, great toe prom, jnt mobs gr 3    Knee mobs/PROM     Tib/Fem Mobs     Patella Mobs     Ankle mobs               NMR re-education (23411)  Min:  CUES NEEDED   wobble     sls X 2 min on bs    Monster walk     Heel raise ecc X 2 min                                       Therapeutic Activity (53442)  Min: Discussed mechanism of injury, anatomy, physiology, biomechanics, sleeping positions,  and strategies to accelerate the healing process. Answered all of patients questions regarding injury. Gave necessary information to get any equipment needed.                 Modalities  Min: Iontophoresis to left lat heel with to go patch, 2.5 ml     IFC with      CP after exercises      after exercises     us 1.5w/cm2 x 8 min pulsed 100% to lateral heel      Other Therapeutic Activities: Pt was educated on PT POC, Diagnosis, Prognosis, pathomechanics as well as frequency and duration of scheduling future physical therapy appointments. Time was also taken on this day to answer all patient questions and participation in PT. Reviewed appointment policy in detail with patient and patient verbalized understanding. Home Exercise Program: Patient was instructed in the following for HEP:     . Patient verbalized/demonstrated understanding and was issued written handout. Access Code: [email or web address]  URL: [email or web address]  Date: 02/07/2023  Prepared by: Refugia Ace    Exercises  Supine Sciatic Nerve Glide - 1 x daily - 7 x weekly - 3 sets - 10 reps  Supine Piriformis Stretch with Foot on Ground - 1 x daily - 7 x weekly - 3 sets - 10 reps  Long Sitting Calf Stretch with Strap - 1 x daily - 7 x weekly - 1 sets - 3 reps - 60 hold  Seated Great Toe Extension - 1 x daily - 7 x weekly - 3 sets - 10 reps  Access Code: [email or web address]  URL: [email or web address]  Date: 02/09/2023  Prepared by: Refugia Ace    Exercises  Standing Ankle Dorsiflexor Toe Extensor Stretch - 1 x daily - 7 x weekly - 3 sets - 10 reps  Seated Toe Flexion Extension PROM - 1 x daily - 7 x weekly - 3 sets - 10 reps      Therapeutic Exercise and NMR EXR  [] (55193) Provided verbal/tactile cueing for activities related to strengthening, flexibility, endurance, ROM for improvements in LE, proximal hip, and core control with self care, mobility, lifting, ambulation.  [] (48120) Provided verbal/tactile cueing for activities related to improving balance, coordination, kinesthetic sense, posture, motor skill, proprioception  to assist with LE, proximal hip, and core control in self care, mobility, lifting, ambulation and eccentric single leg control.      NMR and Therapeutic Activities:    [] (00902 or 26726) Provided verbal/tactile cueing for activities related to improving balance, coordination, kinesthetic sense, posture, motor skill, proprioception and motor activation to allow for proper function of core, proximal hip and LE with self care and ADLs and functional mobility.   [] (55122) Gait Re-education- Provided training and instruction to the patient for proper LE, core and proximal hip recruitment and positioning and eccentric body weight control with ambulation re-education including up and down stairs     Home Exercise Program:    [x] (15955) Reviewed/Progressed HEP activities related to strengthening, flexibility, endurance, ROM of core, proximal hip and LE for functional self-care, mobility, lifting and ambulation/stair navigation   [] (18133)Reviewed/Progressed HEP activities related to improving balance, coordination, kinesthetic sense, posture, motor skill, proprioception of core, proximal hip and LE for self care, mobility, lifting, and ambulation/stair navigation      Manual Treatments:  PROM / STM / Oscillations-Mobs:  G-I, II, III, IV (PA's, Inf., Post.)  [] (27165) Provided manual therapy to mobilize LE, proximal hip and/or LS spine soft tissue/joints for the purpose of modulating pain, promoting relaxation,  increasing ROM, reducing/eliminating soft tissue swelling/inflammation/restriction, improving soft tissue extensibility and allowing for proper ROM for normal function with self care, mobility, lifting and ambulation.      If [facility] Please Indicate Time In/Out  CPT Code Time in Time out                         Approval Dates:  CPT Code Units Approved Units Used  Date Updated:                     Charges:  Timed Code Treatment Minutes: 45   Total Treatment Minutes: 55      [] EVAL (LOW) 18282 (typically 20 minutes face-to-face)  [] EVAL (MOD) 30860 (typically 30 minutes face-to-face)  [] EVAL (HIGH) 41763 (typically 45 minutes face-to-face)  [] RE-EVAL     [x] FX(59115) x  1   [] Dry needle 1 or 2 Muscles (29666)  [] NMR (25537) x [] Dry needle 3+ Muscles (82732)  [x] Manual (69123) x 1  [x] Ultrasound (02591) x1  [] TA (17051) x     [] Mech Traction (37985)  [] ES(attended) (68091)     [] ES (un) (53002):   [] Vasopump (04604) [x] Ionto (20951)   [] Other:    GOALs   GOALS:  Patient stated goal: \" I want to walk with less pain \"  [x] Progressing: [] Met: [] Not Met: [] Adjusted     Therapist goals for Patient:   Short Term Goals: To be achieved in: 2 weeks  1. Independent in HEP and progression per patient tolerance, in order to prevent re-injury. [x] Progressing: [] Met: [] Not Met: [] Adjusted  2. Patient will have a decrease in pain to facilitate improvement in movement, function, and ADLs as indicated by Functional Deficits. [x] Progressing: [] Met: [] Not Met: [] Adjusted     Long Term Goals: To be achieved in: 8 weeks  1. Increase FOTO functional outcome score from 13 to 10  to assist with reaching prior level of function. [x] Progressing: [] Met: [] Not Met: [] Adjusted  2. Patient will demonstrate increased AROM to wfl to allow for proper joint functioning as indicated by patients Functional Deficits. [x] Progressing: [] Met: [] Not Met: [] Adjusted  3. Patient will demonstrate an increase in Strength to good proximal hip strength and control, within 5lb HHD in LE to allow for proper functional mobility as indicated by patients Functional Deficits. [x] Progressing: [] Met: [] Not Met: [] Adjusted  4. Patient will return to walking, adl's functional activities without increased symptoms or restriction.    [x] Progressing: [] Met: [] Not Met: [] Adjusted  5. (patient specific functional goal)    [] Progressing: [] Met: [] Not Met: [] Adjusted            ASSESSMENT:  See eval    Treatment/Activity Tolerance:  [x] Patient tolerated treatment well [] Patient limited by fatique  [] Patient limited by pain  [] Patient limited by other medical complications  [] Other:     Overall Progression Towards Functional goals/ Treatment Progress Update:  [] Patient is progressing as expected towards functional goals listed. [] Progression is slowed due to complexities/Impairments listed. [] Progression has been slowed due to co-morbidities. [x] Plan just implemented, too soon to assess goals progression <30days   [] Goals require adjustment due to lack of progress  [] Patient is not progressing as expected and requires additional follow up with physician  [] Other    Prognosis for POC: [x] Good [] Fair  [] Poor    Patient requires continued skilled intervention: [x] Yes  [] No        PLAN: LE arom, prom  strength, proprioception, gait, balance, functional activities. Mfr, joint mobs, mods as needed, hep. Progress as tolerated, very tight from gluts/piriformis itb, add, gastroc, peroneals, plantar fascia, , ionto, tp release, mat dn    [x] Continue per plan of care [] Alter current plan (see comments)  [] Plan of care initiated [] Hold pending MD visit [] Discharge    Electronically signed by: Caroline Carrillo PT    Note: If patient does not return for scheduled/recommended follow up visits, this note will serve as a discharge from care along with the most recent update on progress.

## 2023-03-02 ENCOUNTER — HOSPITAL ENCOUNTER (OUTPATIENT)
Dept: PHYSICAL THERAPY | Age: 68
Setting detail: THERAPIES SERIES
Discharge: HOME | End: 2023-03-02
Payer: MEDICARE

## 2023-03-02 PROCEDURE — 97140 MANUAL THERAPY 1/> REGIONS: CPT

## 2023-03-02 PROCEDURE — 97033 APP MDLTY 1+IONTPHRSIS EA 15: CPT

## 2023-03-02 PROCEDURE — 97035 APP MDLTY 1+ULTRASOUND EA 15: CPT

## 2023-03-02 NOTE — FLOWSHEET NOTE
[facility] [place]. 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]  Treatment Diagnosis: decreased ability to ambulate and function    Insurance/Certification information:  PT Insurance Information: medicare  Physician Information:  Liliam Mukherjee MD  Plan of care signed (Y/N): routed    Date of Patient follow up with Physician:      Progress Report: []  Yes  [x]  No     Date Range for reporting period:  Beginning: 3/2/2023  Ending:     Progress report due (10 Rx/or 30 days whichever is less): 23     Recertification due (POC duration/ or 90 days whichever is less):      Visit # Insurance/POC Allowable Auth Needed    Wisam García []Yes    []No       Latex Allergy:  [x]NO      []YES  Preferred Language for Healthcare:   [x]English       []Other:    Functional Scale:      Date assessed: at eval  Test: FOTO-13  Score:    Pain level:  3-4/10     History of Injury:Patient is a 70 y/o female with a hx of left heel pain since Dec 2021. She had a left ankle fx with orif in . She c/o constant aching pain in her left achilles which is sharp at times andrew when going down steps. She has been dx with a bone spur and Haglands deformity. She also has left hip pain and lbp. 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  She is active in general.      SUBJECTIVE:  Pt states, \" I have had heel pain for over a year \"  23  Pt states, \" I have only felt the pain a time or 2 since last rx \"  2/14/23  Pt states, \" I'm about the same \"  2/16/23  Pt states, \" maybe a little better \"  2/22/23 Pt states, not getting the sharp as often \"  2/24/23  Pt states, \" I'm doing better overall, still getting the sharp pain on occasion \"  2/28/23  Pt states, \" Sore in a different area since last rx \" \" No sharp stabs \"  3/2/23  Pt states, \" Still sore in the calf, not as bad \"    OBJECTIVE:      ROM LEFT RIGHT   HIP Flex 100 Wfl all    HIP Abd 25     HIP Ext 5     HIP IR 30     HIP ER 40     Knee ext -5     Knee Flex 120     Ankle PF 30     Ankle DF 5     Ankle In 25     Ankle Ev 10     Strength  LEFT RIGHT   HIP Flexors 4 5/5 all   HIP Abductors 4     HIP Ext 4     Hip ER 4     Knee EXT (quad) 4     Knee Flex (HS) 4     Ankle DF 4     Ankle PF 4     Ankle Inv 4     Ankle EV 4        RESTRICTIONS/PRECAUTIONS:     Exercises/Interventions:     Therapeutic Ex (08663)   Min:15 Reps/Resistance Notes/CUES   HEP    Recumbent bike    Bilat ever    Soleus stretch    Great toe ext    Toe extensor stretch    crossover    bosu    Dd sqauts                             Manual Intervention (00854)  Min:15 Mfr to itb, gluts, piriformis, gastroc, lat calc, peroneals, ant tib, great toe prom for df/ext, prom all ankle ranges, great toe prom, jnt mobs gr 3    Knee mobs/PROM     Tib/Fem Mobs     Patella Mobs     Ankle mobs               NMR re-education (67626)  Min:  CUES NEEDED   wobble     sls X 2 min on bs    Monster walk     Heel raise ecc X 2 min                                       Therapeutic Activity (09349)  Min: Discussed mechanism of injury, anatomy, physiology, biomechanics, sleeping positions,  and strategies to accelerate the healing process. Answered all of patients questions regarding injury. Gave necessary information to get any equipment needed.                 Modalities  Min: Iontophoresis to left lat heel with to go patch, 2.5 ml     IFC with      CP after exercises     MH after exercises     us 1.5w/cm2 x 8 min pulsed 100% to lateral heel      Other Therapeutic Activities: Pt was educated on PT POC, Diagnosis, Prognosis, pathomechanics as well as frequency and duration of scheduling future physical therapy appointments. Time was also taken on this day to answer all patient questions and participation in PT. Reviewed appointment policy in detail with patient and patient verbalized understanding. Home Exercise Program: Patient was instructed in the following for HEP:     . Patient verbalized/demonstrated understanding and was issued written handout. Access Code: [email or web address]  URL: [email or web address]  Date: 02/07/2023  Prepared by: Carlie Sings    Exercises  Supine Sciatic Nerve Glide - 1 x daily - 7 x weekly - 3 sets - 10 reps  Supine Piriformis Stretch with Foot on Ground - 1 x daily - 7 x weekly - 3 sets - 10 reps  Long Sitting Calf Stretch with Strap - 1 x daily - 7 x weekly - 1 sets - 3 reps - 60 hold  Seated Great Toe Extension - 1 x daily - 7 x weekly - 3 sets - 10 reps  Access Code: [email or web address]  URL: [email or web address]  Date: 02/09/2023  Prepared by: Carlie Matiass    Exercises  Standing Ankle Dorsiflexor Toe Extensor Stretch - 1 x daily - 7 x weekly - 3 sets - 10 reps  Seated Toe Flexion Extension PROM - 1 x daily - 7 x weekly - 3 sets - 10 reps      Therapeutic Exercise and NMR EXR  [] (54906) Provided verbal/tactile cueing for activities related to strengthening, flexibility, endurance, ROM for improvements in LE, proximal hip, and core control with self care, mobility, lifting, ambulation.  [] (51417) Provided verbal/tactile cueing for activities related to improving balance, coordination, kinesthetic sense, posture, motor skill, proprioception  to assist with LE, proximal hip, and core control in self care, mobility, lifting, ambulation and eccentric single leg control.      NMR and Therapeutic Activities:    [] (41375 or 53951) Provided verbal/tactile cueing for activities related to improving balance, coordination, kinesthetic sense, posture, motor skill, proprioception and motor activation to allow for proper function of core, proximal hip and LE with self care and ADLs and functional mobility.   [] (81758) Gait Re-education- Provided training and instruction to the patient for proper LE, core and proximal hip recruitment and positioning and eccentric body weight control with ambulation re-education including up and down stairs     Home Exercise Program:    [x] (99318) Reviewed/Progressed HEP activities related to strengthening, flexibility, endurance, ROM of core, proximal hip and LE for functional self-care, mobility, lifting and ambulation/stair navigation   [] (36944)Reviewed/Progressed HEP activities related to improving balance, coordination, kinesthetic sense, posture, motor skill, proprioception of core, proximal hip and LE for self care, mobility, lifting, and ambulation/stair navigation      Manual Treatments:  PROM / STM / Oscillations-Mobs:  G-I, II, III, IV (PA's, Inf., Post.)  [] (61674) Provided manual therapy to mobilize LE, proximal hip and/or LS spine soft tissue/joints for the purpose of modulating pain, promoting relaxation,  increasing ROM, reducing/eliminating soft tissue swelling/inflammation/restriction, improving soft tissue extensibility and allowing for proper ROM for normal function with self care, mobility, lifting and ambulation.      If [facility] Please Indicate Time In/Out  CPT Code Time in Time out                         Approval Dates:  CPT Code Units Approved Units Used  Date Updated:                     Charges:  Timed Code Treatment Minutes: 45   Total Treatment Minutes: 55      [] EVAL (LOW) 45002 (typically 20 minutes face-to-face)  [] EVAL (MOD) 58438 (typically 30 minutes face-to-face)  [] EVAL (HIGH) 57800 (typically 45 minutes face-to-face)  [] RE-EVAL     [] MF(16493) x  [] Dry needle 1 or 2 Muscles (93585)  [] NMR (51432) x     [] Dry needle 3+ Muscles (36298)  [x] Manual ([id number]) x 1  [x] Ultrasound (63092) x1  [] TA (06255) x     [] Mech Traction (21965)  [] ES(attended) (57010)     [] ES (un) (26178):   [] Vasopump (46573) [x] Ionto (49689)   [] Other:    GOALs   GOALS:  Patient stated goal: \" I want to walk with less pain \"  [x] Progressing: [] Met: [] Not Met: [] Adjusted     Therapist goals for Patient:   Short Term Goals: To be achieved in: 2 weeks  1. Independent in HEP and progression per patient tolerance, in order to prevent re-injury. [x] Progressing: [] Met: [] Not Met: [] Adjusted  2. Patient will have a decrease in pain to facilitate improvement in movement, function, and ADLs as indicated by Functional Deficits. [x] Progressing: [] Met: [] Not Met: [] Adjusted     Long Term Goals: To be achieved in: 8 weeks  1. Increase FOTO functional outcome score from 13 to 10  to assist with reaching prior level of function. [x] Progressing: [] Met: [] Not Met: [] Adjusted  2. Patient will demonstrate increased AROM to wfl to allow for proper joint functioning as indicated by patients Functional Deficits. [x] Progressing: [] Met: [] Not Met: [] Adjusted  3. Patient will demonstrate an increase in Strength to good proximal hip strength and control, within 5lb HHD in LE to allow for proper functional mobility as indicated by patients Functional Deficits. [x] Progressing: [] Met: [] Not Met: [] Adjusted  4. Patient will return to walking, adl's functional activities without increased symptoms or restriction.    [x] Progressing: [] Met: [] Not Met: [] Adjusted  5. (patient specific functional goal)    [] Progressing: [] Met: [] Not Met: [] Adjusted            ASSESSMENT:  See eval    Treatment/Activity Tolerance:  [x] Patient tolerated treatment well [] Patient limited by fatique  [] Patient limited by pain  [] Patient limited by other medical complications  [] Other:     Overall Progression Towards Functional goals/ Treatment Progress Update:  [] Patient is progressing as expected towards functional goals listed.    [] Progression is slowed due to complexities/Impairments listed.  [] Progression has been slowed due to co-morbidities.  [x] Plan just implemented, too soon to assess goals progression <30days   [] Goals require adjustment due to lack of progress  [] Patient is not progressing as expected and requires additional follow up with physician  [] Other    Prognosis for POC: [x] Good [] Fair  [] Poor    Patient requires continued skilled intervention: [x] Yes  [] No        PLAN: LE arom, prom  strength, proprioception, gait, balance, functional activities.  Mfr, joint mobs, mods as needed, hep. Progress as tolerated, very tight from gluts/piriformis itb, add, gastroc, peroneals, plantar fascia, , ionto, tp release, mat dn    [x] Continue per plan of care [] Alter current plan (see comments)  [] Plan of care initiated [] Hold pending MD visit [] Discharge    Electronically signed by: Daren Cuellar, PT    Note: If patient does not return for scheduled/recommended follow up visits, this note will serve as a discharge from care along with the most recent update on progress.

## 2023-03-10 DIAGNOSIS — I10 ESSENTIAL HYPERTENSION: ICD-10-CM

## 2023-03-10 RX ORDER — LISINOPRIL 10 MG/1
TABLET ORAL
Qty: 90 TABLET | Refills: 3 | Status: SHIPPED | OUTPATIENT
Start: 2023-03-10

## 2023-03-10 NOTE — TELEPHONE ENCOUNTER
Last office visit 12/1/22      Future Appointments   Date Time Provider Leia Palacio   6/6/2023  9:00 AM Zoë Pittman MD [facility]

## 2023-06-03 SDOH — ECONOMIC STABILITY: FOOD INSECURITY: WITHIN THE PAST 12 MONTHS, THE FOOD YOU BOUGHT JUST DIDN'T LAST AND YOU DIDN'T HAVE MONEY TO GET MORE.: NEVER TRUE

## 2023-06-03 SDOH — ECONOMIC STABILITY: INCOME INSECURITY: HOW HARD IS IT FOR YOU TO PAY FOR THE VERY BASICS LIKE FOOD, HOUSING, MEDICAL CARE, AND HEATING?: NOT HARD AT ALL

## 2023-06-03 SDOH — ECONOMIC STABILITY: TRANSPORTATION INSECURITY
IN THE PAST 12 MONTHS, HAS LACK OF TRANSPORTATION KEPT YOU FROM MEETINGS, WORK, OR FROM GETTING THINGS NEEDED FOR DAILY LIVING?: NO

## 2023-06-03 SDOH — ECONOMIC STABILITY: HOUSING INSECURITY
IN THE LAST 12 MONTHS, WAS THERE A TIME WHEN YOU DID NOT HAVE A STEADY PLACE TO SLEEP OR SLEPT IN A SHELTER (INCLUDING NOW)?: NO

## 2023-06-03 SDOH — ECONOMIC STABILITY: FOOD INSECURITY: WITHIN THE PAST 12 MONTHS, YOU WORRIED THAT YOUR FOOD WOULD RUN OUT BEFORE YOU GOT MONEY TO BUY MORE.: NEVER TRUE

## 2023-06-05 NOTE — PROGRESS NOTES
Endocrine: Negative. Genitourinary: Negative. Musculoskeletal: Negative. Skin: Negative. Allergic/Immunologic: Negative. Neurological: Negative. Negative for dizziness and headaches. Hematological: Negative. Psychiatric/Behavioral: Negative. Objective   Physical Exam  Vitals reviewed. Constitutional:       Appearance: Normal appearance. HENT:      Head: Normocephalic. Eyes:      Extraocular Movements: Extraocular movements intact. Pupils: Pupils are equal, round, and reactive to light. Cardiovascular:      Rate and Rhythm: Normal rate. Heart sounds: Normal heart sounds. No murmur heard. Pulmonary:      Effort: Pulmonary effort is normal.      Breath sounds: Normal breath sounds. Abdominal:      General: Bowel sounds are normal.      Palpations: Abdomen is soft. Musculoskeletal:         General: Normal range of motion. Skin:     General: Skin is warm. Neurological:      General: No focal deficit present. Mental Status: She is alert and oriented to person, place, and time. Mental status is at baseline. Psychiatric:         Mood and Affect: Mood normal.              Please note that this chart was generated using dragon dictation software. Although every effort was made to ensure the accuracy of this automated transcription, some errors in transcription may have occurred. An electronic signature was used to authenticate this note.     --China Eason MD

## 2023-06-06 ENCOUNTER — OFFICE VISIT (OUTPATIENT)
Dept: INTERNAL MEDICINE CLINIC | Age: 68
End: 2023-06-06

## 2023-06-06 VITALS
SYSTOLIC BLOOD PRESSURE: 121 MMHG | HEART RATE: 63 BPM | OXYGEN SATURATION: 99 % | TEMPERATURE: 97.2 F | WEIGHT: 174 LBS | DIASTOLIC BLOOD PRESSURE: 69 MMHG | HEIGHT: 64 IN | BODY MASS INDEX: 29.71 KG/M2

## 2023-06-06 DIAGNOSIS — I10 ESSENTIAL HYPERTENSION: Primary | ICD-10-CM

## 2023-06-06 DIAGNOSIS — D64.9 ANEMIA, UNSPECIFIED TYPE: ICD-10-CM

## 2023-06-06 DIAGNOSIS — M81.0 AGE-RELATED OSTEOPOROSIS WITHOUT CURRENT PATHOLOGICAL FRACTURE: ICD-10-CM

## 2023-06-06 DIAGNOSIS — E78.00 HYPERCHOLESTEROLEMIA: ICD-10-CM

## 2023-06-06 LAB
ALBUMIN SERPL-MCNC: 4.8 G/DL (ref 3.4–5)
ALBUMIN/GLOB SERPL: 1.9 {RATIO} (ref 1.1–2.2)
ALP SERPL-CCNC: 74 U/L (ref 40–129)
ALT SERPL-CCNC: 16 U/L (ref 10–40)
ANION GAP SERPL CALCULATED.3IONS-SCNC: 10 MMOL/L (ref 3–16)
AST SERPL-CCNC: 18 U/L (ref 15–37)
BASOPHILS # BLD: 0 K/UL (ref 0–0.2)
BASOPHILS NFR BLD: 0.5 %
BILIRUB SERPL-MCNC: 0.7 MG/DL (ref 0–1)
BUN SERPL-MCNC: 15 MG/DL (ref 7–20)
CALCIUM SERPL-MCNC: 9.4 MG/DL (ref 8.3–10.6)
CHLORIDE SERPL-SCNC: 101 MMOL/L (ref 99–110)
CHOLEST SERPL-MCNC: 169 MG/DL (ref 0–199)
CO2 SERPL-SCNC: 27 MMOL/L (ref 21–32)
CREAT SERPL-MCNC: 0.6 MG/DL (ref 0.6–1.2)
DEPRECATED RDW RBC AUTO: 14.7 % (ref 12.4–15.4)
EOSINOPHIL # BLD: 0.3 K/UL (ref 0–0.6)
EOSINOPHIL NFR BLD: 6.4 %
GFR SERPLBLD CREATININE-BSD FMLA CKD-EPI: >60 ML/MIN/{1.73_M2}
GLUCOSE SERPL-MCNC: 101 MG/DL (ref 70–99)
HCT VFR BLD AUTO: 37.5 % (ref 36–48)
HDLC SERPL-MCNC: 71 MG/DL (ref 40–60)
HGB BLD-MCNC: 13.1 G/DL (ref 12–16)
LDL CHOLESTEROL CALCULATED: 83 MG/DL
LYMPHOCYTES # BLD: 1.9 K/UL (ref 1–5.1)
LYMPHOCYTES NFR BLD: 39.5 %
MCH RBC QN AUTO: 30.2 PG (ref 26–34)
MCHC RBC AUTO-ENTMCNC: 34.9 G/DL (ref 31–36)
MCV RBC AUTO: 86.5 FL (ref 80–100)
MONOCYTES # BLD: 0.4 K/UL (ref 0–1.3)
MONOCYTES NFR BLD: 8.4 %
NEUTROPHILS # BLD: 2.1 K/UL (ref 1.7–7.7)
NEUTROPHILS NFR BLD: 45.2 %
PLATELET # BLD AUTO: 237 K/UL (ref 135–450)
PMV BLD AUTO: 8 FL (ref 5–10.5)
POTASSIUM SERPL-SCNC: 4.8 MMOL/L (ref 3.5–5.1)
PROT SERPL-MCNC: 7.3 G/DL (ref 6.4–8.2)
RBC # BLD AUTO: 4.34 M/UL (ref 4–5.2)
SODIUM SERPL-SCNC: 138 MMOL/L (ref 136–145)
TRIGL SERPL-MCNC: 73 MG/DL (ref 0–150)
VLDLC SERPL CALC-MCNC: 15 MG/DL
WBC # BLD AUTO: 4.7 K/UL (ref 4–11)

## 2023-06-06 ASSESSMENT — ENCOUNTER SYMPTOMS
EYE DISCHARGE: 0
RESPIRATORY NEGATIVE: 1
GASTROINTESTINAL NEGATIVE: 1
ALLERGIC/IMMUNOLOGIC NEGATIVE: 1
EYES NEGATIVE: 1
ABDOMINAL PAIN: 0
SHORTNESS OF BREATH: 0

## 2023-06-06 ASSESSMENT — PATIENT HEALTH QUESTIONNAIRE - PHQ9
SUM OF ALL RESPONSES TO PHQ QUESTIONS 1-9: 0
SUM OF ALL RESPONSES TO PHQ QUESTIONS 1-9: 0
1. LITTLE INTEREST OR PLEASURE IN DOING THINGS: 0
2. FEELING DOWN, DEPRESSED OR HOPELESS: 0
SUM OF ALL RESPONSES TO PHQ QUESTIONS 1-9: 0
SUM OF ALL RESPONSES TO PHQ9 QUESTIONS 1 & 2: 0
SUM OF ALL RESPONSES TO PHQ QUESTIONS 1-9: 0

## 2023-07-06 DIAGNOSIS — M76.62 ACHILLES TENDINITIS OF BOTH LOWER EXTREMITIES: ICD-10-CM

## 2023-07-06 DIAGNOSIS — M76.61 ACHILLES TENDINITIS OF BOTH LOWER EXTREMITIES: ICD-10-CM

## 2023-07-06 RX ORDER — MELOXICAM 7.5 MG/1
TABLET ORAL
Qty: 60 TABLET | Refills: 2 | Status: SHIPPED | OUTPATIENT
Start: 2023-07-06

## 2023-08-05 DIAGNOSIS — M81.0 AGE RELATED OSTEOPOROSIS, UNSPECIFIED PATHOLOGICAL FRACTURE PRESENCE: ICD-10-CM

## 2023-08-07 RX ORDER — ALENDRONATE SODIUM 70 MG/1
TABLET ORAL
Qty: 12 TABLET | Refills: 2 | Status: SHIPPED | OUTPATIENT
Start: 2023-08-07

## 2023-08-07 NOTE — TELEPHONE ENCOUNTER
Future Appointments   Date Time Provider [address]00  [address]   12/5/2023  8:15 AM Rico Welsh MD [facility]     Last appt on 6.6.2023

## 2023-08-28 DIAGNOSIS — E78.00 HYPERCHOLESTEROLEMIA: ICD-10-CM

## 2023-08-28 RX ORDER — PRAVASTATIN SODIUM 40 MG
TABLET ORAL
Qty: 90 TABLET | Refills: 3 | Status: SHIPPED | OUTPATIENT
Start: 2023-08-28

## 2023-08-28 NOTE — TELEPHONE ENCOUNTER
Future Appointments   Date Time Provider [address]00  [address]   12/5/2023  8:15 AM Rico Welsh MD [facility]       Last appt on 6.6.2023

## 2023-12-02 SDOH — HEALTH STABILITY: PHYSICAL HEALTH: ON AVERAGE, HOW MANY MINUTES DO YOU ENGAGE IN EXERCISE AT THIS LEVEL?: 40 MIN

## 2023-12-02 SDOH — HEALTH STABILITY: PHYSICAL HEALTH: ON AVERAGE, HOW MANY DAYS PER WEEK DO YOU ENGAGE IN MODERATE TO STRENUOUS EXERCISE (LIKE A BRISK WALK)?: 3 DAYS

## 2023-12-02 ASSESSMENT — LIFESTYLE VARIABLES
HOW MANY STANDARD DRINKS CONTAINING ALCOHOL DO YOU HAVE ON A TYPICAL DAY: 1 OR 2
HOW OFTEN DO YOU HAVE A DRINK CONTAINING ALCOHOL: 2-4 TIMES A MONTH
HOW MANY STANDARD DRINKS CONTAINING ALCOHOL DO YOU HAVE ON A TYPICAL DAY: 1
HOW OFTEN DO YOU HAVE SIX OR MORE DRINKS ON ONE OCCASION: 1
HOW OFTEN DO YOU HAVE A DRINK CONTAINING ALCOHOL: 3

## 2023-12-02 ASSESSMENT — PATIENT HEALTH QUESTIONNAIRE - PHQ9
1. LITTLE INTEREST OR PLEASURE IN DOING THINGS: 0
SUM OF ALL RESPONSES TO PHQ9 QUESTIONS 1 & 2: 0
SUM OF ALL RESPONSES TO PHQ QUESTIONS 1-9: 0
2. FEELING DOWN, DEPRESSED OR HOPELESS: 0
SUM OF ALL RESPONSES TO PHQ QUESTIONS 1-9: 0

## 2023-12-05 ENCOUNTER — OFFICE VISIT (OUTPATIENT)
Dept: INTERNAL MEDICINE CLINIC | Age: 68
End: 2023-12-05

## 2023-12-05 VITALS
OXYGEN SATURATION: 97 % | HEART RATE: 63 BPM | HEIGHT: 64 IN | DIASTOLIC BLOOD PRESSURE: 78 MMHG | BODY MASS INDEX: 30.7 KG/M2 | SYSTOLIC BLOOD PRESSURE: 116 MMHG | WEIGHT: 179.8 LBS | RESPIRATION RATE: 19 BRPM

## 2023-12-05 DIAGNOSIS — E78.00 HYPERCHOLESTEROLEMIA: ICD-10-CM

## 2023-12-05 DIAGNOSIS — I10 ESSENTIAL HYPERTENSION: ICD-10-CM

## 2023-12-05 DIAGNOSIS — Z00.00 MEDICARE ANNUAL WELLNESS VISIT, SUBSEQUENT: Primary | ICD-10-CM

## 2023-12-05 DIAGNOSIS — M48.02 CERVICAL STENOSIS OF SPINE: ICD-10-CM

## 2023-12-05 DIAGNOSIS — M48.02 FORAMINAL STENOSIS OF CERVICAL REGION: ICD-10-CM

## 2023-12-05 ASSESSMENT — ENCOUNTER SYMPTOMS: BACK PAIN: 1

## 2023-12-05 NOTE — PROGRESS NOTES
Medicare Annual Wellness Visit    Michel Jimenez is here for Medicare AWV  And routine follow up. Assessment & Plan   Medicare annual wellness visit, subsequent  Essential hypertension-stable, continue with her current dose of lisinopril 10 mg daily. Hypercholesterolemia-stable, tolerating her Pravachol, continue the same. Cervical stenosis of spine-recommended the patient to follow-up with spine/orthopedic, she is to  follow orthopedic at [facility] and would like to continue care. MRI done from December 2016 reviewed, presence of cervical canal stenosis and foraminal stenosis noted at C6/C7. Also has known physical therapy in the past.  Physical therapy offered, would like to speak with orthopedic first.    -     External Referral To Orthopedic Surgery  Foraminal stenosis of cervical region-worsening, referrals made to orthopedic. Continue with use of Tylenol and meloxicam alternatively. Use of ice and heat. -     External Referral To Orthopedic Surgery  Recommendations for Preventive Services Due: see orders and patient instructions/AVS.  Recommended screening schedule for the next 5-10 years is provided to the patient in written form: see Patient Instructions/AVS.     Return in 3 months (on 3/5/2024) for HTN, Blood work. Subjective       Patient's complete Health Risk Assessment and screening values have been reviewed and are found in Flowsheets. The following problems were reviewed today and where indicated follow up appointments were made and/or referrals ordered.     Positive Risk Factor Screenings with Interventions:                 Weight and Activity:  Physical Activity: Insufficiently Active (12/2/2023)    Exercise Vital Sign     Days of Exercise per Week: 3 days     Minutes of Exercise per Session: 40 min     On average, how many days per week do you engage in moderate to strenuous exercise (like a brisk walk)?: 3 days  Have you lost any weight without trying in the past 3 months?: No  Body

## 2024-01-09 ENCOUNTER — HOSPITAL ENCOUNTER (OUTPATIENT)
Dept: PHYSICAL THERAPY | Age: 69
Setting detail: THERAPIES SERIES
Discharge: HOME | End: 2024-01-09
Payer: MEDICARE

## 2024-01-09 DIAGNOSIS — R68.89 DECREASED EXERCISE TOLERANCE: ICD-10-CM

## 2024-01-09 DIAGNOSIS — M25.60 LIMITED JOINT RANGE OF MOTION (ROM): ICD-10-CM

## 2024-01-09 DIAGNOSIS — Z78.9 NEED FOR HOME EXERCISE PROGRAM: ICD-10-CM

## 2024-01-09 DIAGNOSIS — R53.1 FUNCTIONAL WEAKNESS: ICD-10-CM

## 2024-01-09 DIAGNOSIS — Z56.89 DIFFICULTY PERFORMING WORK ACTIVITIES: ICD-10-CM

## 2024-01-09 DIAGNOSIS — R68.89 DECREASED ACTIVITY TOLERANCE: ICD-10-CM

## 2024-01-09 DIAGNOSIS — R68.89 DECREASED ABILITY TO PERFORM ACTIVITIES: ICD-10-CM

## 2024-01-09 DIAGNOSIS — R52 PAIN: Primary | ICD-10-CM

## 2024-01-09 PROCEDURE — 97140 MANUAL THERAPY 1/> REGIONS: CPT

## 2024-01-09 PROCEDURE — 97110 THERAPEUTIC EXERCISES: CPT

## 2024-01-09 PROCEDURE — 97162 PT EVAL MOD COMPLEX 30 MIN: CPT

## 2024-01-09 NOTE — PLAN OF CARE
Modified Oswestry to assist with return top prior level of function.    Status: [] Progressing: [] Met: [] Not Met: [] Adjusted  Improve cerv rot to symmetrical  to allow for proper joint functioning as indicated by patients functional deficits.  Status: [] Progressing: [] Met: [] Not Met: [] Adjusted  Improve shldr girdle strength to at least 4+/5 bilat (scaption and prone HAB).   Status: [] Progressing: [] Met: [] Not Met: [] Adjusted  Patient will return to Usual work, housework or activities and lifting an object from the floor without increased symptoms or restriction to work towards return to prior level of function.  Status: [] Progressing: [] Met: [] Not Met: [] Adjusted  Patient will resume normal work/leisure activities without pain.            Status: [] Progressing: [] Met: [] Not Met: [] Adjusted    TREATMENT PLAN     Frequency/Duration: 1-2x/week for  5-10  weeks for the following treatment interventions:    Interventions:  [x] Therapeutic exercise including: strength training, ROM, including postural re-education.   [x] NMR activation and proprioception, including postural re-education.    [x] Manual therapy as indicated to include: PROM, Gr I-IV mobilizations, STM, and Dry Needling/IASTM  [x] Modalities as needed that may include: Cryotherapy and Thermal Agents  [x] Patient education on joint protection, postural re-education, activity modification, progression of HEP.        [] Aquatic Therapy     HEP instruction: Refer to HEP instructions outlined on the flowsheet on 01/09/2024.    Electronically Signed by Osvaldo Ballard PT  Date: 01/09/2024

## 2024-01-09 NOTE — FLOWSHEET NOTE
[facility]- Outpatient Rehabilitation and Therapy [address], [address], [place] [address] office: [phone] fax: [phone]      Physical Therapy: TREATMENT/PROGRESS NOTE   Patient: Nicky Ellis (68 y.o. female)   Treatment Date: 2024   :  1955 MRN: [id number]   Visit #: 1   Insurance Allowable Auth Needed   medicare []Yes    []No    Insurance: Payor: MEDICARE / Plan: MEDICARE PART A AND B / Product Type: *No Product type* /   Insurance ID: [id number] - (Medicare)  Secondary Insurance (if applicable): [employer]   Treatment Diagnosis:     ICD-10-CM    1. Pain  R52       2. Decreased activity tolerance  R68.89       3. Limited joint range of motion (ROM)  M25.60       4. Decreased exercise tolerance  R68.89       5. Difficulty performing work activities  Z56.89       6. Functional weakness  R53.1       7. Decreased ability to perform activities  R68.89       8. Need for home exercise program  Z78.9          Medical Diagnosis:    Pain in thoracic spine [M54.6]   Referring Physician: Adriano Mosqueda MD  PCP: Mauriloi Collier MD                             Plan of care signed (Y/N):     Date of Patient follow up with Physician:      Progress Report/POC: EVAL today  POC update due: (10 visits /OR AUTH LIMITS, whichever is less)  2024     Chronic pain (Cut and paste Precautions/Contraindications from Eval)    Preferred Language for Healthcare:   [x]English       []other:    Pt here for evaluation for upper back pain/ neck/ shldr strain. She has had chronic neck tightness and upper back tightness for many years, but recently increased this past Fall. She also has chronic back pain which peaked on [place] day, but has improved with prednisone. Again this upper back strain started a few months ago without injury. She had been doing [employer] class for about a year with weights, but in the fall she started noticing increased strain across her shoulders and neck.

## 2024-01-11 ENCOUNTER — HOSPITAL ENCOUNTER (OUTPATIENT)
Dept: PHYSICAL THERAPY | Age: 69
Setting detail: THERAPIES SERIES
Discharge: HOME | End: 2024-01-11
Payer: MEDICARE

## 2024-01-11 PROCEDURE — 97140 MANUAL THERAPY 1/> REGIONS: CPT

## 2024-01-11 PROCEDURE — 97110 THERAPEUTIC EXERCISES: CPT

## 2024-01-11 PROCEDURE — 97112 NEUROMUSCULAR REEDUCATION: CPT

## 2024-01-11 NOTE — FLOWSHEET NOTE
[facility]- Outpatient Rehabilitation and Therapy [address], [place] [address] office: [phone] fax: [phone]      Physical Therapy: TREATMENT/PROGRESS NOTE   Patient: Nicky Ellis (68 y.o. female)   Treatment Date: 2024   :  1955 MRN: [id number]   Visit #: 2   Insurance Allowable Auth Needed   medicare []Yes    []No    Insurance: Payor: MEDICARE / Plan: MEDICARE PART A AND B / Product Type: *No Product type* /   Insurance ID: [id number] - (Medicare)  Secondary Insurance (if applicable): [employer]   Treatment Diagnosis:   No diagnosis found.     Medical Diagnosis:    Pain in thoracic spine [M54.6]   Referring Physician: Adriano Mosqueda MD  PCP: Maurilio Collier MD                             Plan of care signed (Y/N):     Date of Patient follow up with Physician:      Progress Report/POC: NO  POC update due: (10 visits /OR AUTH LIMITS, whichever is less)  2024     Chronic pain (Cut and paste Precautions/Contraindications from Eval)    Preferred Language for Healthcare:   [x]English       []other:    Pt here for evaluation for upper back pain/ neck/ shldr strain. She has had chronic neck tightness and upper back tightness for many years, but recently increased this past Fall. She also has chronic back pain which peaked on [place] day, but has improved with prednisone. Again this upper back strain started a few months ago without injury. She had been doing [employer] class for about a year with weights, but in the fall she started noticing increased strain across her shoulders and neck. She lowered weights which helped some, but she also has been watching her grandkids 2x/week and one is a  requiring a lot of care and holding in her L arm. Pain is improved with heat and massage, but she hasn't had a massage in years. She has stopped her [employer] Sne[employer] class over the past few weeks with the holidays, but plans to resume soon. She saw

## 2024-01-17 ENCOUNTER — HOSPITAL ENCOUNTER (OUTPATIENT)
Dept: PHYSICAL THERAPY | Age: 69
Setting detail: THERAPIES SERIES
Discharge: HOME | End: 2024-01-17
Payer: MEDICARE

## 2024-01-17 PROCEDURE — 97110 THERAPEUTIC EXERCISES: CPT

## 2024-01-17 PROCEDURE — 97140 MANUAL THERAPY 1/> REGIONS: CPT

## 2024-01-17 PROCEDURE — 97112 NEUROMUSCULAR REEDUCATION: CPT

## 2024-01-17 NOTE — FLOWSHEET NOTE
strenuous; deferred to HAB above   lawnmower 3#  2x15 ea >resume    row       Push up @ wall 2x15 >resume    scaption 1-2# bilat x15 >resume Full ROM                 Pt Edu   x Pathology, role of joint mobilizations, role of self mobs, and cont'd targeted upper quarter strength   Manual Intervention (25160)  TIME     Thoracic manual  10' > Prone: Unilat/ bilat PA, STM paraspinal, CT jxn PA    Supine: mid thoracic; mid /    Cervical manual  10 x Side glides, up glides, mtract and SO DTM - Gr 4 mobs   CT jxn  10 x Prone CT rot Gr 4                 NMR re-education (07118) Resistance Sets/ Reps/ Time Completed  CUES NEEDED   Thoracic ext str Seated and Supine on SB 5s x 5 x Pt prefers supine          Self mob rot 5s x 5 ea x Lower Csp cross body self mob: low to mid grade per jenny                               Therapeutic Activity (33836)  Sets/time                     Modalities:    No modalities applied this session    Education/Home Exercise Program: Patient HEP program created electronically.  Refer to [employer] access code: 1/9: TB HAB pull apart, bilat scaption, thoracic ext str  1/11: add lawnmower, SB thoracic ext str, wall push up      ASSESSMENT     Today's Assessment:  Jenny session well. Improved Cerv rot after mobs and self stretches. Held some of the ex's today due to patient needing to leave to get grandkids to school.      Medical Necessity Documentation:  I certify that this patient meets the below criteria necessary for medical necessity for care and/or justification of therapy services:  The patient has functional impairments and/or activity limitations and would benefit from continued outpatient therapy services to address the deficits outlined in the patients goals  The patient has a musculoskeletal condition(s) with a corresponding ICD-10 code that is of complexity and severity that require skilled therapeutic intervention. This has a direct and significant impact on the need for therapy and

## 2024-01-19 ENCOUNTER — HOSPITAL ENCOUNTER (OUTPATIENT)
Dept: PHYSICAL THERAPY | Age: 69
Setting detail: THERAPIES SERIES
Discharge: HOME | End: 2024-01-19
Payer: MEDICARE

## 2024-01-19 PROCEDURE — 97110 THERAPEUTIC EXERCISES: CPT

## 2024-01-19 PROCEDURE — 97140 MANUAL THERAPY 1/> REGIONS: CPT

## 2024-01-19 NOTE — FLOWSHEET NOTE
01/19/2024     Note: If patient does not return for scheduled/recommended follow up visits, this note will serve as a discharge from care along with the most recent update on progress.

## 2024-01-24 ENCOUNTER — HOSPITAL ENCOUNTER (OUTPATIENT)
Dept: PHYSICAL THERAPY | Age: 69
Setting detail: THERAPIES SERIES
Discharge: HOME | End: 2024-01-24
Payer: MEDICARE

## 2024-01-24 PROCEDURE — 97112 NEUROMUSCULAR REEDUCATION: CPT

## 2024-01-24 PROCEDURE — 97110 THERAPEUTIC EXERCISES: CPT

## 2024-01-24 NOTE — FLOWSHEET NOTE
progression   [] Goals require adjustment due to lack of progress  [] Patient is not progressing as expected and requires additional follow up with physician  [] Other:     CHARGE CAPTURE     PT CHARGE GRID   CPT Code (TIMED) minutes # CPT Code (UNTIMED) #     Therex (37408)  25 2  EVAL:MODERATE (99785 - Typically 30 minutes face-to-face)     Neuromusc. Re-ed (04060) 15 1  Re-Eval (21858)     Manual (35324)    Estim Unattended (02328)     Ther. Act (09948)    Mech. Traction (60535)     Gait (09749)    Dry Needle 1-2 muscle (03818)     Aquatic Therex (48200)    Dry Needle 3+ muscle (20561)     Iontophoresis (60339)    VASO (90749)     Ultrasound (56988)    Group Therapy (97102)     Estim Attended (06122)    Canalith Repositioning (51839)     Other:    Other:    Total Timed Code Tx Minutes 40 3       Total Treatment Minutes 40        Charge Justification:  (08219) THERAPEUTIC EXERCISE - Provided verbal/tactile cueing for activities related to strengthening, flexibility, endurance, ROM performed to prevent loss of range of motion, maintain or improve muscular strength or increase flexibility, following either an injury or surgery.   (56734) HOME EXERCISE PROGRAM - Reviewed/Progressed HEP activities related to strengthening, flexibility, endurance, ROM performed to prevent loss of range of motion, maintain or improve muscular strength or increase flexibility, following either an injury or surgery.  (19895) NEUROMUSCULAR RE-EDUCATION - Therapeutic procedure, 1 or more areas, each 15 minutes; neuromuscular reeducation of movement, balance, coordination, kinesthetic sense, posture, and/or proprioception for sitting and/or standing activities  (72364) HOME EXERCISE PROGRAM - Reviewed/Progressed HEP activities related to neuromuscular reeducation of movement, balance, coordination, kinesthetic sense, posture, and/or proprioception for sitting and/or standing activities    (48357) THERAPEUTIC ACTIVITY - use of dynamic

## 2024-01-30 ENCOUNTER — HOSPITAL ENCOUNTER (OUTPATIENT)
Dept: PHYSICAL THERAPY | Age: 69
Setting detail: THERAPIES SERIES
Discharge: HOME | End: 2024-01-30
Payer: MEDICARE

## 2024-01-30 PROCEDURE — 97110 THERAPEUTIC EXERCISES: CPT

## 2024-01-30 PROCEDURE — 97140 MANUAL THERAPY 1/> REGIONS: CPT

## 2024-01-30 NOTE — FLOWSHEET NOTE
measurements: see eval    Exercises/Interventions:     Therapeutic Ex (01104)  Resistance Sets/ Reps/ Time Completed Notes/Cues/Progressions   cardio nustep 4' > UE/LE   AAROM/ AROM                     TB HAB pull apart black 2x15 x    No money   hold Attempted, but mildly strenuous; deferred to HAB above   lawnmower 5# X15 ea >resume    row       Push up @ counter  2x15 x (Full serratus/ pike prn)    scaption 3# bilat 2x15 x Full ROM   TB loop scaption yellow 2x10 > Full ROM          Prone CT extension over SB  2x8 x                  Pt Edu   x Thorough review of HEP with targeted movements for each specific ex   Manual Intervention (80143)  TIME     Thoracic manual  10' > Prone: Unilat/ bilat PA, STM paraspinal, CT jxn PA    Supine: mid thoracic; mid /    Cervical manual  10 x Side glides, up glides, mtract  - Gr 4 mobs   CT jxn  10 x Prone CT rot Gr 4                  NMR re-education (87711) Resistance Sets/ Reps/ Time Completed  CUES NEEDED   Thoracic ext str  Supine on SB 5s x 5 x Pt prefers supine   Seated CT jxn ext motor control practice  5s x 10 >prn Cues for chest lift/ thoracic extension.    Self mob Lower Csp cross body self mob: low to mid grade per jenny 5s x 5 ea >prn                                Therapeutic Activity (93204)  Sets/time                     Modalities:    No modalities applied this session    Education/Home Exercise Program: Patient HEP program created electronically.  Refer to [employer] access code: 1/9: TB HAB pull apart, bilat scaption, thoracic ext str  1/11: add lawnmower, SB thoracic ext str, wall push up  1/24: Access Code: [email or web address]  URL: [email or web address]  Date: 01/24/2024  Prepared by: Christoph Ballard    Exercises  - Shoulder Scaption at Wall Thumbs Up  - 4-6 x weekly - 2-3 sets - 10-15 reps  - Prone Upper Back Extension Off Table with Hands Behind Head  - 2-3 x daily - 4-6 x weekly - 2-3 sets - 10-15 reps  - Quadruped Floor Squat to a [place] to a Push Up  -

## 2024-02-02 ENCOUNTER — HOSPITAL ENCOUNTER (OUTPATIENT)
Dept: PHYSICAL THERAPY | Age: 69
Setting detail: THERAPIES SERIES
Discharge: HOME | End: 2024-02-02
Payer: MEDICARE

## 2024-02-02 PROCEDURE — 97110 THERAPEUTIC EXERCISES: CPT

## 2024-02-02 NOTE — FLOWSHEET NOTE
[facility]- Outpatient Rehabilitation and Therapy [address], [place] [address] office: [phone] fax: [phone]      Physical Therapy: TREATMENT/PROGRESS NOTE   Patient: Nicky Ellis (68 y.o. female)   Treatment Date: 2024   :  1955 MRN: [id number]   Visit #: 7   Insurance Allowable Auth Needed   medicare []Yes    []No    Insurance: Payor: MEDICARE / Plan: MEDICARE PART A AND B / Product Type: *No Product type* /   Insurance ID: [id number] - (Medicare)  Secondary Insurance (if applicable): [employer]   Treatment Diagnosis:   No diagnosis found.     Medical Diagnosis:    Pain in thoracic spine [M54.6]   Referring Physician: Adriano Mosqueda MD  PCP: Maurilio Collier MD                             Plan of care signed (Y/N):     Date of Patient follow up with Physician:      Progress Report/POC: NO  POC update due: (10 visits /OR AUTH LIMITS, whichever is less)  2024     Chronic pain (Cut and paste Precautions/Contraindications from Eval)    Preferred Language for Healthcare:   [x]English       []other:    Pt here for evaluation for upper back pain/ neck/ shldr strain. She has had chronic neck tightness and upper back tightness for many years, but recently increased this past Fall. She also has chronic back pain which peaked on Indira day, but has improved with prednisone. Again this upper back strain started a few months ago without injury. She had been doing Silver Sneakers class for about a year with weights, but in the fall she started noticing increased strain across her shoulders and neck. She lowered weights which helped some, but she also has been watching her grandkids 2x/week and one is a  requiring a lot of care and holding in her L arm. Pain is improved with heat and massage, but she hasn't had a massage in years. She has stopped her Silver Sneakers class over the past few weeks with the holidays, but plans to resume soon. She saw

## 2024-02-06 ENCOUNTER — HOSPITAL ENCOUNTER (OUTPATIENT)
Dept: PHYSICAL THERAPY | Age: 69
Setting detail: THERAPIES SERIES
Discharge: HOME | End: 2024-02-06
Payer: MEDICARE

## 2024-02-06 PROCEDURE — 97110 THERAPEUTIC EXERCISES: CPT

## 2024-02-06 PROCEDURE — 97140 MANUAL THERAPY 1/> REGIONS: CPT

## 2024-02-06 NOTE — FLOWSHEET NOTE
near symmetrical with just very slight restriction L rotation    Test measurements: see eval    Exercises/Interventions:     Therapeutic Ex (98780)  Resistance Sets/ Reps/ Time Completed Notes/Cues/Progressions   cardio nustep 4' > UE/LE   AAROM/ AROM                     TB HAB pull apart black 2x15 >resume prn    No money   hold Attempted, but mildly strenuous; deferred to HAB above   lawnmower 6# 2X15 ea x Attempted 8#, but too much strain   row black 2x15 x    Push up @ counter  2x15 x scaption 3# bilat 2x15 x Full ROM   TB loop scaption yellow 2x10 > Full ROM   Thoracic rot str @ wall X10 ea x Hand behind head   Prone CT extension over SB  2x8 >                  Pt Edu   x Appropriate mgmt of strains, cont same ex program adjust prn   Manual Intervention (00407)  TIME     Thoracic manual  10' > Prone: Unilat/ bilat PA, STM paraspinal, CT jxn PA    Supine: mid thoracic; mid /    Cervical manual  10 > Side glides, up glides, mtract  - Gr 4 mobs   CT jxn  10 x   Seated CT dist - mid grade                 NMR re-education (10023) Resistance Sets/ Reps/ Time Completed  CUES NEEDED   Thoracic ext str  Supine on SB    Foam roll 5s x 5        5s x 3 x 3 >        x Pt prefers supine   Seated CT jxn ext motor control practice  5s x 10 >prn Cues for chest lift/ thoracic extension.    Self mob Lower Csp cross body self mob: low to mid grade per jenny > >prn Reviewed 2/2 w/ options for manual or trigger point stick                               Therapeutic Activity (03807)  Sets/time                     Modalities:    No modalities applied this session    Education/Home Exercise Program: Patient HEP program created electronically.  Refer to [employer] access code: 1/9: TB HAB pull apart, bilat scaption, thoracic ext str  1/11: add lawnmower, SB thoracic ext str, wall push up  1/24: Access Code: [email or web address]  URL: [email or web address]  Date: 01/24/2024  Prepared by: Christoph Ballard    Exercises  - Shoulder Scaption

## 2024-02-09 ENCOUNTER — APPOINTMENT (OUTPATIENT)
Dept: PHYSICAL THERAPY | Age: 69
End: 2024-02-09
Payer: MEDICARE

## 2024-02-13 ENCOUNTER — APPOINTMENT (OUTPATIENT)
Dept: PHYSICAL THERAPY | Age: 69
End: 2024-02-13
Payer: MEDICARE

## 2024-02-16 ENCOUNTER — HOSPITAL ENCOUNTER (OUTPATIENT)
Dept: PHYSICAL THERAPY | Age: 69
Setting detail: THERAPIES SERIES
Discharge: HOME | End: 2024-02-16
Payer: MEDICARE

## 2024-02-16 PROCEDURE — 97140 MANUAL THERAPY 1/> REGIONS: CPT

## 2024-02-16 PROCEDURE — 97110 THERAPEUTIC EXERCISES: CPT

## 2024-02-16 NOTE — PLAN OF CARE
[facility] - Outpatient Rehabilitation & Therapy  [address] [place] [address]  Phone: [phone]   Fax:     [phone]    **Please sign and fax ASAP**      Physical Therapy Prescription    Date: 2024    Patient Name: Nicky Ellis  : 1955  MRN: [id number]    Diagnosis:Pain in thoracic spine [M54.6]       Referring Physician: Adriano Mosqueda MD    Drug Allergies:    With your approval we would like to add the following to the patient's current PT treatment:    [x] DRY NEEDLING   [] TENS Unit        [] [facility] Cervical Traction Unit        [] [facility] Lumbar Traction Unit    [] Quad/Standard Cane        [] Rolling/Standard Walker         [] Iontophoresis: Dexamethasone 4mg/ml injectable-30 ml vial     Quantity: 1 vial for iontophoresis     As needed        Electronically signed by:  Osvaldo Ballard, PT DPT, OCS    If you have any questions or concerns, please don't hesitate to call.  Thank you for your referral.    Physician Signature:________________________________Date:__________________  By signing above, therapist’s plan is approved by physician

## 2024-02-16 NOTE — FLOWSHEET NOTE
[facility]- Outpatient Rehabilitation and Therapy [address], [place] [address] office: [phone] fax: [phone]      Physical Therapy: TREATMENT/PROGRESS NOTE   Patient: Nicky Ellis (68 y.o. female)   Treatment Date: 2024   :  1955 MRN: [id number]   Visit #: 9   Insurance Allowable Auth Needed   medicare []Yes    []No    Insurance: Payor: MEDICARE / Plan: MEDICARE PART A AND B / Product Type: *No Product type* /   Insurance ID: [id number] - (Medicare)  Secondary Insurance (if applicable): MED[employer] M[employer]UAL   Treatment Diagnosis:   No diagnosis found.     Medical Diagnosis:    Pain in thoracic spine [M54.6]   Referring Physician: Adriano Mosqueda MD  PCP: Maurilio Collier MD                             Plan of care signed (Y/N):     Date of Patient follow up with Physician:      Progress Report/POC: NO  POC update due: (10 visits /OR AUTH LIMITS, whichever is less)  2024     Chronic pain (Cut and paste Precautions/Contraindications from Eval)    Preferred Language for Healthcare:   [x]English       []other:    Pt here for evaluation for upper back pain/ neck/ shldr strain. She has had chronic neck tightness and upper back tightness for many years, but recently increased this past Fall. She also has chronic back pain which peaked on Indira day, but has improved with prednisone. Again this upper back strain started a few months ago without injury. She had been doing Silver Sneakers class for about a year with weights, but in the fall she started noticing increased strain across her shoulders and neck. She lowered weights which helped some, but she also has been watching her grandkids 2x/week and one is a  requiring a lot of care and holding in her L arm. Pain is improved with heat and massage, but she hasn't had a massage in years. She has stopped her Silver Sneakers class over the past few weeks with the holidays, but plans to resume soon. She saw

## 2024-02-20 ENCOUNTER — APPOINTMENT (OUTPATIENT)
Dept: PHYSICAL THERAPY | Age: 69
End: 2024-02-20
Payer: MEDICARE

## 2024-02-23 ENCOUNTER — HOSPITAL ENCOUNTER (OUTPATIENT)
Dept: PHYSICAL THERAPY | Age: 69
Setting detail: THERAPIES SERIES
Discharge: HOME | End: 2024-02-23
Payer: MEDICARE

## 2024-02-23 PROCEDURE — 97110 THERAPEUTIC EXERCISES: CPT

## 2024-02-23 PROCEDURE — 97140 MANUAL THERAPY 1/> REGIONS: CPT

## 2024-02-23 NOTE — FLOWSHEET NOTE
[facility]- Outpatient Rehabilitation and Therapy [address], [place] [address] office: [phone] fax: [phone]      Physical Therapy Re-Certification Plan of Care/MD UPDATE      Dear Adriano Mosqueda MD,    We had the pleasure of treating the following patient for physical therapy services at [facility] Outpatient Rehabilitation and Therapy.  A summary of our findings can be found in the updated assessment below.  This includes our plan of care.  If you have any questions or concerns regarding these findings, please do not hesitate to contact me at the office phone number checked above.  Thank you for the referral.     Physician Signature:________________________________Date:__________________  By signing above (or electronic signature), therapist’s plan is approved by physician    Date Range Of Visits: 1/9/24 to 2/23/24  Total Visits to Date: 10  Overall Response to Treatment:   []Patient is responding well to treatment and improvement is noted with regards  to goals   []Patient should continue to improve in reasonable time if they continue HEP   []Patient has plateaued and is no longer responding to skilled PT intervention    []Patient is getting worse and would benefit from return to referring MD   []Patient unable to adhere to initial POC   [x]Other: Pt has shown moderate improvement thus far in L sided neck pain and R sided upper trap pain. She still gets flares of R upper trap, but these are much less frequent and typically less intense. We have tried several approaches from thoracic/ CT junction mobilizations as well as targeted exercise for this region with moderate improvement. She may benefit from dry needling to this region due to trigger point, but we recently decided to try this and we have not yet received a signature on the request to add this to the plan of care. I do think this could be of some help to her, but given the chronicity of her problem, its a bit

## 2024-02-26 DIAGNOSIS — I10 ESSENTIAL HYPERTENSION: ICD-10-CM

## 2024-02-26 RX ORDER — LISINOPRIL 10 MG/1
TABLET ORAL
Qty: 90 TABLET | Refills: 0 | Status: SHIPPED | OUTPATIENT
Start: 2024-02-26

## 2024-02-26 NOTE — TELEPHONE ENCOUNTER
Future Appointments   Date Time Provider Department Center   3/19/2024  8:15 AM Maurilio Collier MD [facility] [place] [facility]         Last appt 12/5/23

## 2024-02-27 ENCOUNTER — APPOINTMENT (OUTPATIENT)
Dept: PHYSICAL THERAPY | Age: 69
End: 2024-02-27
Payer: MEDICARE

## 2024-03-16 ASSESSMENT — PATIENT HEALTH QUESTIONNAIRE - PHQ9
SUM OF ALL RESPONSES TO PHQ QUESTIONS 1-9: 0
2. FEELING DOWN, DEPRESSED OR HOPELESS: NOT AT ALL
SUM OF ALL RESPONSES TO PHQ9 QUESTIONS 1 & 2: 0
1. LITTLE INTEREST OR PLEASURE IN DOING THINGS: NOT AT ALL
SUM OF ALL RESPONSES TO PHQ9 QUESTIONS 1 & 2: 0
1. LITTLE INTEREST OR PLEASURE IN DOING THINGS: NOT AT ALL
2. FEELING DOWN, DEPRESSED OR HOPELESS: NOT AT ALL

## 2024-03-18 ASSESSMENT — ENCOUNTER SYMPTOMS
RESPIRATORY NEGATIVE: 1
SHORTNESS OF BREATH: 0
ABDOMINAL PAIN: 0
ALLERGIC/IMMUNOLOGIC NEGATIVE: 1
EYES NEGATIVE: 1
EYE DISCHARGE: 0
GASTROINTESTINAL NEGATIVE: 1

## 2024-03-18 NOTE — PROGRESS NOTES
dizziness and headaches.   Hematological: Negative.    Psychiatric/Behavioral: Negative.            Objective   Physical Exam  Vitals reviewed.   Constitutional:       Appearance: Normal appearance.   HENT:      Head: Normocephalic.   Eyes:      Extraocular Movements: Extraocular movements intact.      Pupils: Pupils are equal, round, and reactive to light.   Cardiovascular:      Rate and Rhythm: Normal rate.      Heart sounds: Normal heart sounds. No murmur heard.  Pulmonary:      Effort: Pulmonary effort is normal.      Breath sounds: Normal breath sounds.   Chest:      Chest wall: No mass, swelling or tenderness.   Breasts:     Breasts are symmetrical.      Right: Normal. No inverted nipple, mass, nipple discharge or skin change.      Left: Normal. No inverted nipple, mass, nipple discharge or skin change.   Abdominal:      General: Bowel sounds are normal.      Palpations: Abdomen is soft.   Musculoskeletal:         General: Normal range of motion.   Skin:     General: Skin is warm.   Neurological:      General: No focal deficit present.      Mental Status: She is alert and oriented to person, place, and time. Mental status is at baseline.   Psychiatric:         Mood and Affect: Mood normal.            Please note that this chart was generated using dragon dictation software.  Although every effort was made to ensure the accuracy of this automated transcription, some errors in transcription may have occurred.    An electronic signature was used to authenticate this note.    --Maurilio Collier MD

## 2024-03-19 ENCOUNTER — OFFICE VISIT (OUTPATIENT)
Dept: INTERNAL MEDICINE CLINIC | Age: 69
End: 2024-03-19

## 2024-03-19 VITALS
SYSTOLIC BLOOD PRESSURE: 132 MMHG | HEART RATE: 59 BPM | OXYGEN SATURATION: 100 % | HEIGHT: 64 IN | DIASTOLIC BLOOD PRESSURE: 73 MMHG | BODY MASS INDEX: 28.1 KG/M2 | WEIGHT: 164.6 LBS

## 2024-03-19 DIAGNOSIS — E78.00 HYPERCHOLESTEROLEMIA: ICD-10-CM

## 2024-03-19 DIAGNOSIS — I10 ESSENTIAL HYPERTENSION: Primary | ICD-10-CM

## 2024-03-19 DIAGNOSIS — R73.03 PREDIABETES: ICD-10-CM

## 2024-03-19 LAB
ALBUMIN SERPL-MCNC: 4.6 G/DL (ref 3.4–5)
ALBUMIN/GLOB SERPL: 2 {RATIO} (ref 1.1–2.2)
ALP SERPL-CCNC: 69 U/L (ref 40–129)
ALT SERPL-CCNC: 20 U/L (ref 10–40)
ANION GAP SERPL CALCULATED.3IONS-SCNC: 8 MMOL/L (ref 3–16)
AST SERPL-CCNC: 21 U/L (ref 15–37)
BILIRUB SERPL-MCNC: 0.6 MG/DL (ref 0–1)
BUN SERPL-MCNC: 16 MG/DL (ref 7–20)
CALCIUM SERPL-MCNC: 9.1 MG/DL (ref 8.3–10.6)
CHLORIDE SERPL-SCNC: 103 MMOL/L (ref 99–110)
CHOLEST SERPL-MCNC: 149 MG/DL (ref 0–199)
CO2 SERPL-SCNC: 26 MMOL/L (ref 21–32)
CREAT SERPL-MCNC: 0.6 MG/DL (ref 0.6–1.2)
GFR SERPLBLD CREATININE-BSD FMLA CKD-EPI: >60 ML/MIN/{1.73_M2}
GLUCOSE SERPL-MCNC: 89 MG/DL (ref 70–99)
HDLC SERPL-MCNC: 74 MG/DL (ref 40–60)
LDL CHOLESTEROL CALCULATED: 65 MG/DL
POTASSIUM SERPL-SCNC: 4 MMOL/L (ref 3.5–5.1)
PROT SERPL-MCNC: 6.9 G/DL (ref 6.4–8.2)
SODIUM SERPL-SCNC: 137 MMOL/L (ref 136–145)
TRIGL SERPL-MCNC: 50 MG/DL (ref 0–150)
VLDLC SERPL CALC-MCNC: 10 MG/DL

## 2024-03-20 LAB
EST. AVERAGE GLUCOSE BLD GHB EST-MCNC: 116.9 MG/DL
HBA1C MFR BLD: 5.7 %

## 2024-04-13 DIAGNOSIS — M81.0 AGE RELATED OSTEOPOROSIS, UNSPECIFIED PATHOLOGICAL FRACTURE PRESENCE: ICD-10-CM

## 2024-04-15 RX ORDER — ALENDRONATE SODIUM 70 MG/1
TABLET ORAL
Qty: 12 TABLET | Refills: 2 | Status: SHIPPED | OUTPATIENT
Start: 2024-04-15

## 2024-04-15 NOTE — TELEPHONE ENCOUNTER
Future Appointments   Date Time Provider Department Center   9/17/2024  8:30 AM Maurilio Collier MD [facility] [place] [facility]       Last appt 3/19/24

## 2024-05-20 ENCOUNTER — OFFICE VISIT (OUTPATIENT)
Dept: INTERNAL MEDICINE CLINIC | Age: 69
End: 2024-05-20
Payer: MEDICARE

## 2024-05-20 ENCOUNTER — PATIENT MESSAGE (OUTPATIENT)
Dept: INTERNAL MEDICINE CLINIC | Age: 69
End: 2024-05-20

## 2024-05-20 ENCOUNTER — HOSPITAL ENCOUNTER (OUTPATIENT)
Dept: CT IMAGING | Age: 69
Discharge: HOME | End: 2024-05-20
Payer: MEDICARE

## 2024-05-20 VITALS
TEMPERATURE: 98.4 F | OXYGEN SATURATION: 99 % | DIASTOLIC BLOOD PRESSURE: 84 MMHG | BODY MASS INDEX: 29.85 KG/M2 | HEART RATE: 66 BPM | WEIGHT: 174 LBS | SYSTOLIC BLOOD PRESSURE: 128 MMHG

## 2024-05-20 DIAGNOSIS — R10.32 LLQ PAIN: Primary | ICD-10-CM

## 2024-05-20 DIAGNOSIS — R10.32 LLQ PAIN: ICD-10-CM

## 2024-05-20 PROCEDURE — G8427 DOCREV CUR MEDS BY ELIG CLIN: HCPCS | Performed by: NURSE PRACTITIONER

## 2024-05-20 PROCEDURE — 1123F ACP DISCUSS/DSCN MKR DOCD: CPT | Performed by: NURSE PRACTITIONER

## 2024-05-20 PROCEDURE — 1036F TOBACCO NON-USER: CPT | Performed by: NURSE PRACTITIONER

## 2024-05-20 PROCEDURE — 99213 OFFICE O/P EST LOW 20 MIN: CPT | Performed by: NURSE PRACTITIONER

## 2024-05-20 PROCEDURE — 3074F SYST BP LT 130 MM HG: CPT | Performed by: NURSE PRACTITIONER

## 2024-05-20 PROCEDURE — 6360000004 HC RX CONTRAST MEDICATION: Performed by: NURSE PRACTITIONER

## 2024-05-20 PROCEDURE — 1090F PRES/ABSN URINE INCON ASSESS: CPT | Performed by: NURSE PRACTITIONER

## 2024-05-20 PROCEDURE — G8417 CALC BMI ABV UP PARAM F/U: HCPCS | Performed by: NURSE PRACTITIONER

## 2024-05-20 PROCEDURE — 74176 CT ABD & PELVIS W/O CONTRAST: CPT

## 2024-05-20 PROCEDURE — 3079F DIAST BP 80-89 MM HG: CPT | Performed by: NURSE PRACTITIONER

## 2024-05-20 PROCEDURE — G8399 PT W/DXA RESULTS DOCUMENT: HCPCS | Performed by: NURSE PRACTITIONER

## 2024-05-20 PROCEDURE — 3017F COLORECTAL CA SCREEN DOC REV: CPT | Performed by: NURSE PRACTITIONER

## 2024-05-20 RX ORDER — CIPROFLOXACIN 500 MG/1
500 TABLET, FILM COATED ORAL 2 TIMES DAILY
Qty: 20 TABLET | Refills: 0 | Status: SHIPPED | OUTPATIENT
Start: 2024-05-20 | End: 2024-05-30

## 2024-05-20 RX ORDER — METRONIDAZOLE 500 MG/1
500 TABLET ORAL 3 TIMES DAILY
Qty: 30 TABLET | Refills: 0 | Status: SHIPPED | OUTPATIENT
Start: 2024-05-20 | End: 2024-05-30

## 2024-05-20 RX ADMIN — IOPAMIDOL 50 ML: 612 INJECTION, SOLUTION INTRAVENOUS at 10:53

## 2024-05-20 ASSESSMENT — ENCOUNTER SYMPTOMS
CONSTIPATION: 1
VOMITING: 0
BLOOD IN STOOL: 0
ABDOMINAL PAIN: 1
NAUSEA: 0

## 2024-05-21 NOTE — TELEPHONE ENCOUNTER
From: Nicky Ellis  To: Zoë Aceves  Sent: 5/20/2024 4:06 PM EDT  Subject: Diverticulitis recovery diet    I saw the results of the CT scan and got notification that the antibiotics are ready. Are there dietary recommendations for the next few days to help with the recovery?

## 2024-05-29 DIAGNOSIS — I10 ESSENTIAL HYPERTENSION: ICD-10-CM

## 2024-05-29 RX ORDER — LISINOPRIL 10 MG/1
TABLET ORAL
Qty: 90 TABLET | Refills: 0 | Status: SHIPPED | OUTPATIENT
Start: 2024-05-29

## 2024-05-29 NOTE — TELEPHONE ENCOUNTER
Future Appointments   Date Time Provider Department Center   9/17/2024  8:30 AM Maurilio Collier MD [facility] [place] [facility]       Last appt 5/20/24

## 2024-07-14 DIAGNOSIS — M76.62 ACHILLES TENDINITIS OF BOTH LOWER EXTREMITIES: ICD-10-CM

## 2024-07-14 DIAGNOSIS — M76.61 ACHILLES TENDINITIS OF BOTH LOWER EXTREMITIES: ICD-10-CM

## 2024-07-15 RX ORDER — MELOXICAM 7.5 MG/1
TABLET ORAL
Qty: 60 TABLET | Refills: 2 | Status: SHIPPED | OUTPATIENT
Start: 2024-07-15

## 2024-07-15 NOTE — TELEPHONE ENCOUNTER
Future Appointments   Date Time Provider Department Center   9/17/2024  8:30 AM Maurilio Collier MD [facility] [place] [facility]     Last appt 05/20/24

## 2024-08-26 DIAGNOSIS — I10 ESSENTIAL HYPERTENSION: ICD-10-CM

## 2024-08-26 DIAGNOSIS — E78.00 HYPERCHOLESTEROLEMIA: ICD-10-CM

## 2024-08-26 RX ORDER — LISINOPRIL 10 MG/1
TABLET ORAL
Qty: 90 TABLET | Refills: 0 | Status: SHIPPED | OUTPATIENT
Start: 2024-08-26

## 2024-08-26 RX ORDER — PRAVASTATIN SODIUM 40 MG
TABLET ORAL
Qty: 90 TABLET | Refills: 1 | Status: SHIPPED | OUTPATIENT
Start: 2024-08-26

## 2024-08-26 NOTE — TELEPHONE ENCOUNTER
Medication:   Requested Prescriptions     Pending Prescriptions Disp Refills    pravastatin (PRAVACHOL) 40 MG tablet 90 tablet 3     Sig: TAKE ONE TABLET BY MOUTH EVERY EVENING FOR CHOLESTEROL       Last Filled:      Patient Phone Number: [phone] (home)     Last appt: 5/20/2024   Next appt: 9/17/2024  Future Appointments   Date Time Provider Department Center   9/17/2024  8:30 AM Maurilio Collier MD [facility]

## 2024-08-26 NOTE — TELEPHONE ENCOUNTER
Future Appointments   Date Time Provider Department Center   9/17/2024  8:30 AM Maurilio Collier MD [facility]       Last appt 5/20/24

## 2024-09-14 SDOH — ECONOMIC STABILITY: FOOD INSECURITY: WITHIN THE PAST 12 MONTHS, YOU WORRIED THAT YOUR FOOD WOULD RUN OUT BEFORE YOU GOT MONEY TO BUY MORE.: NEVER TRUE

## 2024-09-14 SDOH — ECONOMIC STABILITY: FOOD INSECURITY: WITHIN THE PAST 12 MONTHS, THE FOOD YOU BOUGHT JUST DIDN'T LAST AND YOU DIDN'T HAVE MONEY TO GET MORE.: NEVER TRUE

## 2024-09-14 SDOH — ECONOMIC STABILITY: INCOME INSECURITY: HOW HARD IS IT FOR YOU TO PAY FOR THE VERY BASICS LIKE FOOD, HOUSING, MEDICAL CARE, AND HEATING?: NOT HARD AT ALL

## 2024-09-16 ASSESSMENT — ENCOUNTER SYMPTOMS
ABDOMINAL PAIN: 0
GASTROINTESTINAL NEGATIVE: 1
SHORTNESS OF BREATH: 0
RESPIRATORY NEGATIVE: 1
EYE DISCHARGE: 0
EYES NEGATIVE: 1
ALLERGIC/IMMUNOLOGIC NEGATIVE: 1

## 2024-09-17 ENCOUNTER — HOSPITAL ENCOUNTER (OUTPATIENT)
Age: 69
Discharge: HOME | End: 2024-09-17
Payer: MEDICARE

## 2024-09-17 ENCOUNTER — HOSPITAL ENCOUNTER (OUTPATIENT)
Dept: GENERAL RADIOLOGY | Age: 69
Discharge: HOME | End: 2024-09-17
Payer: MEDICARE

## 2024-09-17 ENCOUNTER — OFFICE VISIT (OUTPATIENT)
Dept: INTERNAL MEDICINE CLINIC | Age: 69
End: 2024-09-17

## 2024-09-17 VITALS
HEART RATE: 59 BPM | SYSTOLIC BLOOD PRESSURE: 130 MMHG | BODY MASS INDEX: 29.68 KG/M2 | DIASTOLIC BLOOD PRESSURE: 84 MMHG | WEIGHT: 173 LBS | OXYGEN SATURATION: 99 %

## 2024-09-17 DIAGNOSIS — M25.552 LEFT HIP PAIN: Primary | ICD-10-CM

## 2024-09-17 DIAGNOSIS — G89.29 CHRONIC RIGHT SI JOINT PAIN: ICD-10-CM

## 2024-09-17 DIAGNOSIS — M53.3 CHRONIC RIGHT SI JOINT PAIN: ICD-10-CM

## 2024-09-17 DIAGNOSIS — K57.90 DIVERTICULOSIS: ICD-10-CM

## 2024-09-17 DIAGNOSIS — Z71.85 VACCINE COUNSELING: ICD-10-CM

## 2024-09-17 DIAGNOSIS — M25.552 LEFT HIP PAIN: ICD-10-CM

## 2024-09-17 DIAGNOSIS — M25.552 LATERAL PAIN OF LEFT HIP: ICD-10-CM

## 2024-09-17 DIAGNOSIS — I10 ESSENTIAL HYPERTENSION: ICD-10-CM

## 2024-09-17 DIAGNOSIS — R73.03 PREDIABETES: ICD-10-CM

## 2024-09-17 DIAGNOSIS — R53.83 OTHER FATIGUE: ICD-10-CM

## 2024-09-17 LAB
ALBUMIN SERPL-MCNC: 4.5 G/DL (ref 3.4–5)
ALBUMIN/GLOB SERPL: 2 {RATIO} (ref 1.1–2.2)
ALP SERPL-CCNC: 55 U/L (ref 40–129)
ALT SERPL-CCNC: 18 U/L (ref 10–40)
ANION GAP SERPL CALCULATED.3IONS-SCNC: 11 MMOL/L (ref 3–16)
AST SERPL-CCNC: 22 U/L (ref 15–37)
BASOPHILS # BLD: 0 K/UL (ref 0–0.2)
BASOPHILS NFR BLD: 0.6 %
BILIRUB SERPL-MCNC: 0.5 MG/DL (ref 0–1)
BUN SERPL-MCNC: 13 MG/DL (ref 7–20)
CALCIUM SERPL-MCNC: 9.6 MG/DL (ref 8.3–10.6)
CHLORIDE SERPL-SCNC: 104 MMOL/L (ref 99–110)
CO2 SERPL-SCNC: 25 MMOL/L (ref 21–32)
CREAT SERPL-MCNC: 0.7 MG/DL (ref 0.6–1.2)
DEPRECATED RDW RBC AUTO: 14.2 % (ref 12.4–15.4)
EOSINOPHIL # BLD: 0.2 K/UL (ref 0–0.6)
EOSINOPHIL NFR BLD: 4.8 %
GFR SERPLBLD CREATININE-BSD FMLA CKD-EPI: >90 ML/MIN/{1.73_M2}
GLUCOSE SERPL-MCNC: 90 MG/DL (ref 70–99)
HBA1C MFR BLD: 5.9 %
HCT VFR BLD AUTO: 36 % (ref 36–48)
HGB BLD-MCNC: 12.1 G/DL (ref 12–16)
LYMPHOCYTES # BLD: 2.3 K/UL (ref 1–5.1)
LYMPHOCYTES NFR BLD: 49.7 %
MCH RBC QN AUTO: 29.6 PG (ref 26–34)
MCHC RBC AUTO-ENTMCNC: 33.7 G/DL (ref 31–36)
MCV RBC AUTO: 87.7 FL (ref 80–100)
MONOCYTES # BLD: 0.3 K/UL (ref 0–1.3)
MONOCYTES NFR BLD: 5.8 %
NEUTROPHILS # BLD: 1.8 K/UL (ref 1.7–7.7)
NEUTROPHILS NFR BLD: 39.1 %
PLATELET # BLD AUTO: 252 K/UL (ref 135–450)
PMV BLD AUTO: 8.1 FL (ref 5–10.5)
POTASSIUM SERPL-SCNC: 4.5 MMOL/L (ref 3.5–5.1)
PROT SERPL-MCNC: 6.7 G/DL (ref 6.4–8.2)
RBC # BLD AUTO: 4.11 M/UL (ref 4–5.2)
SODIUM SERPL-SCNC: 140 MMOL/L (ref 136–145)
TSH SERPL DL<=0.005 MIU/L-ACNC: 1.71 UIU/ML (ref 0.27–4.2)
WBC # BLD AUTO: 4.7 K/UL (ref 4–11)

## 2024-09-17 PROCEDURE — 73522 X-RAY EXAM HIPS BI 3-4 VIEWS: CPT

## 2024-09-17 SDOH — ECONOMIC STABILITY: FOOD INSECURITY: WITHIN THE PAST 12 MONTHS, THE FOOD YOU BOUGHT JUST DIDN'T LAST AND YOU DIDN'T HAVE MONEY TO GET MORE.: NEVER TRUE

## 2024-09-17 SDOH — ECONOMIC STABILITY: FOOD INSECURITY: WITHIN THE PAST 12 MONTHS, YOU WORRIED THAT YOUR FOOD WOULD RUN OUT BEFORE YOU GOT MONEY TO BUY MORE.: NEVER TRUE

## 2024-09-17 SDOH — ECONOMIC STABILITY: INCOME INSECURITY: HOW HARD IS IT FOR YOU TO PAY FOR THE VERY BASICS LIKE FOOD, HOUSING, MEDICAL CARE, AND HEATING?: NOT HARD AT ALL

## 2024-09-17 ASSESSMENT — ENCOUNTER SYMPTOMS: BACK PAIN: 1

## 2024-09-24 ENCOUNTER — HOSPITAL ENCOUNTER (OUTPATIENT)
Dept: PHYSICAL THERAPY | Age: 69
Setting detail: THERAPIES SERIES
Discharge: HOME | End: 2024-09-24
Payer: MEDICARE

## 2024-09-24 DIAGNOSIS — R53.1 FUNCTIONAL WEAKNESS: ICD-10-CM

## 2024-09-24 DIAGNOSIS — Z56.89 DIFFICULTY PERFORMING WORK ACTIVITIES: ICD-10-CM

## 2024-09-24 DIAGNOSIS — R68.89 DECREASED EXERCISE TOLERANCE: ICD-10-CM

## 2024-09-24 DIAGNOSIS — R68.89 DECREASED ABILITY TO PERFORM ACTIVITIES: ICD-10-CM

## 2024-09-24 DIAGNOSIS — Z78.9 NEED FOR HOME EXERCISE PROGRAM: ICD-10-CM

## 2024-09-24 DIAGNOSIS — R26.89 DECREASED FUNCTIONAL MOBILITY: ICD-10-CM

## 2024-09-24 DIAGNOSIS — R26.2 DIFFICULTY WALKING: Primary | ICD-10-CM

## 2024-09-24 DIAGNOSIS — R68.89 DECREASED FUNCTIONAL ACTIVITY TOLERANCE: ICD-10-CM

## 2024-09-24 PROCEDURE — 97530 THERAPEUTIC ACTIVITIES: CPT

## 2024-09-24 PROCEDURE — 97161 PT EVAL LOW COMPLEX 20 MIN: CPT

## 2024-09-24 PROCEDURE — 97110 THERAPEUTIC EXERCISES: CPT

## 2024-09-26 ENCOUNTER — HOSPITAL ENCOUNTER (OUTPATIENT)
Dept: PHYSICAL THERAPY | Age: 69
Setting detail: THERAPIES SERIES
Discharge: HOME | End: 2024-09-26
Payer: MEDICARE

## 2024-09-26 PROCEDURE — 97110 THERAPEUTIC EXERCISES: CPT

## 2024-09-26 PROCEDURE — 97140 MANUAL THERAPY 1/> REGIONS: CPT

## 2024-10-01 ENCOUNTER — HOSPITAL ENCOUNTER (OUTPATIENT)
Dept: PHYSICAL THERAPY | Age: 69
Setting detail: THERAPIES SERIES
Discharge: HOME | End: 2024-10-01
Payer: MEDICARE

## 2024-10-01 PROCEDURE — 97140 MANUAL THERAPY 1/> REGIONS: CPT

## 2024-10-01 PROCEDURE — 97110 THERAPEUTIC EXERCISES: CPT

## 2024-10-01 NOTE — FLOWSHEET NOTE
reps  - Supine Figure 4 Piriformis Stretch  - 1 x daily - 7 x weekly - 3 sets - 10 reps  - Supine Posterior Pelvic Tilt  - 1 x daily - 7 x weekly - 3 sets - 10 reps    ASSESSMENT   Assessment:   Nicky Ellis is a 68 y.o. female presenting today to Outpatient PT with signs and symptoms consistent with bilateral hip bursitis as well as decreased rom and hip strength.  She also lacks lumbar motion and has weak core musceles. .    Pt. presents with the functional impairments and activity limitations listed below and would benefit from Outpatient PT to address the below impairments as well as improve pain, and restore function.   10/1/24  Not as sore in gluts and piriformis, She does have some pain with lumbar pa's      Today's Assessment: See above and During therapy this date, patient required verbal cueing for improving proper muscle recruitment and activation/motor control patterns.Patient will continue to benefit from ongoing evaluation and advanced clinical decision from a Physical Therapist to address and improve pain control and muscle strength to safely return to PLOF and ADLs/IADLs without symptoms or restrictions.    Medical Necessity Documentation:  I certify that this patient meets the below criteria necessary for medical necessity for care and/or justification of therapy services:  The patient has functional impairments and/or activity limitations and would benefit from continued outpatient therapy services to address the deficits outlined in the patients goals    Return to Play: NA    Prognosis for POC: [x] Good [] Fair  [] Poor    Patient requires continued skilled intervention: [x] Yes  [] No      CHARGE CAPTURE     PT CHARGE GRID   CPT Code (TIMED) minutes # CPT Code (UNTIMED) #     Therex (02728)  24 1  EVAL:LOW (39943 - Typically 20 minutes face-to-face)     Neuromusc. Re-ed (29823)    Re-Eval (19866)     Manual (96625) 25 2  Estim Unattended (95104)     Ther. Act (23865)    [facility] Traction (70924)

## 2024-10-03 ENCOUNTER — HOSPITAL ENCOUNTER (OUTPATIENT)
Dept: PHYSICAL THERAPY | Age: 69
Setting detail: THERAPIES SERIES
Discharge: HOME | End: 2024-10-03
Payer: MEDICARE

## 2024-10-03 PROCEDURE — 97110 THERAPEUTIC EXERCISES: CPT

## 2024-10-03 PROCEDURE — 97140 MANUAL THERAPY 1/> REGIONS: CPT

## 2024-10-03 NOTE — FLOWSHEET NOTE
interventions:    Interventions:  Therapeutic Exercise (29876) including: strength training, ROM, and functional mobility  Therapeutic Activities (09527) including: functional mobility training and education.  Neuromuscular Re-education (58756) activation and proprioception, including postural re-education.    Gait Training (37904) for normalization of ambulation patterns and AD training.   Manual Therapy (03657) as indicated to include: Passive Range of Motion, Gr I-IV mobilizations, Soft Tissue Mobilization, Trigger Point Release, and Myofascial Release  Modalities as needed that may include: NONE  Patient education on joint protection, postural re-education, activity modification, and progression of HEP    Plan: POC initiated as per evaluationLumbar rom, strength, myofascial release, muscle enregy technique, joint mobs  le stretches, ns exercises, hep, modalities as needed, hip mobs and strength .  Do TL junction mobs, mfr, hip and core strength. Hip jnt mobs   9/26/24  Try TL junction mob next rx.   10/1/24  Progress standing exs. Walkouts  10/3/24  Try more lumbar pa's upa's sl mobs.       Electronically Signed by Daren Cuellar, PT  Date: 10/03/2024     Note: Portions of this note have been templated and/or copied from initial evaluation, reassessments and prior notes for documentation efficiency.    Note: If patient does not return for scheduled/recommended follow up visits, this note will serve as a discharge from care along with the most recent update on progress.    Ortho Evaluation

## 2024-10-08 ENCOUNTER — HOSPITAL ENCOUNTER (OUTPATIENT)
Dept: PHYSICAL THERAPY | Age: 69
Setting detail: THERAPIES SERIES
Discharge: HOME | End: 2024-10-08
Payer: MEDICARE

## 2024-10-08 PROCEDURE — 97140 MANUAL THERAPY 1/> REGIONS: CPT

## 2024-10-08 PROCEDURE — 97110 THERAPEUTIC EXERCISES: CPT

## 2024-10-08 NOTE — FLOWSHEET NOTE
[facility]- Outpatient Rehabilitation and Therapy [address], [place] [address] office: [phone] fax: [phone]     Physical Therapy Initial Evaluation Certification      Dear Maurilio Collier MD,    We had the pleasure of evaluating the following patient for physical therapy services at [facility] Outpatient Physical Therapy.  A summary of our findings can be found in the initial assessment below.  This includes our plan of care.  If you have any questions or concerns regarding these findings, please do not hesitate to contact me at the office phone number listed above.  Thank you for the referral.     Physician Signature:_______________________________Date:__________________  By signing above (or electronic signature), therapist’s plan is approved by physician       Physical Therapy: TREATMENT/PROGRESS NOTE   Patient: Nicky Ellis (68 y.o. female)   Examination Date: 10/08/2024   :  1955 MRN: [id number]   Visit #:   Insurance Allowable Auth Needed   mn []Yes    [x]No    Insurance: Payor: MEDICARE / Plan: MEDICARE PART A AND B / Product Type: *No Product type* /   Insurance ID: [id number] - (Medicare)  Secondary Insurance (if applicable): [employer]   Treatment Diagnosis:     ICD-10-CM    1. Difficulty walking  R26.2       2. Decreased functional mobility  R26.89       3. Decreased functional activity tolerance  R68.89       4. Difficulty performing work activities  Z56.89       5. Functional weakness  R53.1       6. Need for home exercise program  Z78.9       7. Decreased ability to perform activities  R68.89       8. Decreased exercise tolerance  R68.89          Medical Diagnosis:  Left hip pain [M25.552]  Lateral pain of left hip [M25.552]  Chronic right SI joint pain [M53.3, G89.29]   Referring Physician: Maurilio Collier MD  PCP: Maurilio Collier MD     Plan of care signed (Y/N): routed    Date of Patient follow up with Physician:      Plan of Care

## 2024-10-10 ENCOUNTER — HOSPITAL ENCOUNTER (OUTPATIENT)
Dept: PHYSICAL THERAPY | Age: 69
Setting detail: THERAPIES SERIES
Discharge: HOME | End: 2024-10-10
Payer: MEDICARE

## 2024-10-10 PROCEDURE — 97110 THERAPEUTIC EXERCISES: CPT

## 2024-10-10 PROCEDURE — 97140 MANUAL THERAPY 1/> REGIONS: CPT

## 2024-10-10 NOTE — FLOWSHEET NOTE
Sidelying Thoracic Rotation with Open Book  - 1 x daily - 7 x weekly - 3 sets - 10 reps  - Supine Lower Trunk Rotation  - 1 x daily - 7 x weekly - 3 sets - 10 reps  - Supine Bridge  - 1 x daily - 7 x weekly - 3 sets - 10 reps  - Supine Figure 4 Piriformis Stretch  - 1 x daily - 7 x weekly - 3 sets - 10 reps  - Supine Posterior Pelvic Tilt  - 1 x daily - 7 x weekly - 3 sets - 10 reps  Access Code: [email or web address]  URL: [email or web address]  Date: 10/03/2024  Prepared by: Jonny Cuellar    Exercises  - Standing Hip Extension with Resistance at Ankles and Counter Support  - 1 x daily - 7 x weekly - 3 sets - 10 reps  - Standing Hip Abduction with Resistance at Ankles and Counter Support  - 1 x daily - 7 x weekly - 3 sets - 10 reps  Access Code: [email or web address]  URL: [email or web address]  Date: 10/03/2024  Prepared by: Jonny Cuellar    Exercises  - hip extension  - 1 x daily - 7 x weekly - 3 sets - 10 reps  Access Code: [email or web address]  URL: [email or web address]  Date: 10/10/2024  Prepared by: Jonny Cuellar    Exercises  - Supine March  - 1 x daily - 7 x weekly - 3 sets - 10 reps  - Bent Knee Fallouts  - 1 x daily - 7 x weekly - 3 sets - 10 reps  - Sit Up with Arm Reach  - 1 x daily - 7 x weekly - 3 sets - 10 reps  ASSESSMENT   Assessment:   Nicky Ellis is a 68 y.o. female presenting today to Outpatient PT with signs and symptoms consistent with bilateral hip bursitis as well as decreased rom and hip strength.  She also lacks lumbar motion and has weak core musceles. .    Pt. presents with the functional impairments and activity limitations listed below and would benefit from Outpatient PT to address the below impairments as well as improve pain, and restore function.   10/1/24  Not as sore in gluts and piriformis, She does have some pain with lumbar pa's  10/3/24  Tolerating exs and manual therapy well.  Had more pain in lumbar area today.  Will address lumbar a little more and see if it helps.   10/8/24  She was more flared up

## 2024-10-15 ENCOUNTER — HOSPITAL ENCOUNTER (OUTPATIENT)
Dept: PHYSICAL THERAPY | Age: 69
Setting detail: THERAPIES SERIES
Discharge: HOME | End: 2024-10-15
Payer: MEDICARE

## 2024-10-15 PROCEDURE — 97110 THERAPEUTIC EXERCISES: CPT

## 2024-10-15 PROCEDURE — 97140 MANUAL THERAPY 1/> REGIONS: CPT

## 2024-10-15 NOTE — FLOWSHEET NOTE
(27855)    Group Therapy (24691)     Estim Attended (92730)    Canalith Repositioning (01435)     Physical Performance Test (91857)         Other:    Other:    Total Timed Code Tx Minutes 45 3       Total Treatment Minutes 45        Charge Justification:  (97794) THERAPEUTIC EXERCISE - Provided verbal/tactile cueing for activities related to strengthening, flexibility, endurance, ROM performed to prevent loss of range of motion, maintain or improve muscular strength or increase flexibility, following either an injury or surgery.   (62170) MANUAL THERAPY -  Manual therapy techniques, 1 or more regions, each 15 minutes (Mobilization/manipulation, manual lymphatic drainage, manual traction) for the purpose of modulating pain, promoting relaxation,  increasing ROM, reducing/eliminating soft tissue swelling/inflammation/restriction, improving soft tissue extensibility and allowing for proper ROM for normal function with self care, mobility, lifting and ambulation    GOALS     Patient stated goal: \" I want to have less pain \"  [x] Progressing: [] Met: [] Not Met: [] Adjusted    Therapist goals for Patient:   Short Term Goals: To be achieved in: 4 weeks  1. Independent in HEP and progression per patient tolerance, in order to prevent re-injury.   [x] Progressing: [] Met: [] Not Met: [] Adjusted  2. Patient will have a decrease in pain to <2/10 to facilitate improvement in movement, function, and ADLs as indicated by Functional Deficits.  [x] Progressing: [] Met: [] Not Met: [] Adjusted    Long Term Goals: To be achieved in: 8 weeks  1. Disability index score of 15 or less for the WOMAC to assist with reaching prior level of function with activities such as walking, exercise classes. .  [x] Progressing: [] Met: [] Not Met: [] Adjusted  2. Patient will demonstrate increased AROM of bilateral hips  to wfl  without pain to allow for proper joint functioning to enable patient to walking and adl's .   [x] Progressing: [] Met: []

## 2024-10-22 ENCOUNTER — HOSPITAL ENCOUNTER (OUTPATIENT)
Dept: PHYSICAL THERAPY | Age: 69
Setting detail: THERAPIES SERIES
End: 2024-10-22
Payer: MEDICARE

## 2024-11-01 ENCOUNTER — HOSPITAL ENCOUNTER (EMERGENCY)
Age: 69
Discharge: HOME | End: 2024-11-01
Attending: EMERGENCY MEDICINE
Payer: MEDICARE

## 2024-11-01 VITALS
HEART RATE: 64 BPM | DIASTOLIC BLOOD PRESSURE: 72 MMHG | SYSTOLIC BLOOD PRESSURE: 151 MMHG | BODY MASS INDEX: 29.4 KG/M2 | HEIGHT: 64 IN | OXYGEN SATURATION: 99 % | RESPIRATION RATE: 18 BRPM | WEIGHT: 172.18 LBS | TEMPERATURE: 97.6 F

## 2024-11-01 DIAGNOSIS — S86.212A STRAIN OF MUSCLE(S) AND TENDON(S) OF ANTERIOR MUSCLE GROUP AT LOWER LEG LEVEL, LEFT LEG, INITIAL ENCOUNTER: Primary | ICD-10-CM

## 2024-11-01 LAB
ALBUMIN SERPL-MCNC: 5 G/DL (ref 3.4–5)
ALBUMIN/GLOB SERPL: 2.5 {RATIO} (ref 1.1–2.2)
ALP SERPL-CCNC: 58 U/L (ref 40–129)
ALT SERPL-CCNC: 17 U/L (ref 10–40)
ANION GAP SERPL CALCULATED.3IONS-SCNC: 10 MMOL/L (ref 3–16)
AST SERPL-CCNC: 20 U/L (ref 15–37)
BASOPHILS # BLD: 0 K/UL (ref 0–0.2)
BASOPHILS NFR BLD: 0.9 %
BILIRUB SERPL-MCNC: 0.4 MG/DL (ref 0–1)
BUN SERPL-MCNC: 14 MG/DL (ref 7–20)
CALCIUM SERPL-MCNC: 10 MG/DL (ref 8.3–10.6)
CHLORIDE SERPL-SCNC: 100 MMOL/L (ref 99–110)
CO2 SERPL-SCNC: 27 MMOL/L (ref 21–32)
CREAT SERPL-MCNC: 0.6 MG/DL (ref 0.6–1.2)
D-DIMER QUANTITATIVE: 0.38 UG/ML FEU (ref 0–0.6)
DEPRECATED RDW RBC AUTO: 14.2 % (ref 12.4–15.4)
EOSINOPHIL # BLD: 0.1 K/UL (ref 0–0.6)
EOSINOPHIL NFR BLD: 2.8 %
GFR SERPLBLD CREATININE-BSD FMLA CKD-EPI: >90 ML/MIN/{1.73_M2}
GLUCOSE SERPL-MCNC: 102 MG/DL (ref 70–99)
HCT VFR BLD AUTO: 35.5 % (ref 36–48)
HGB BLD-MCNC: 12.1 G/DL (ref 12–16)
LYMPHOCYTES # BLD: 2.1 K/UL (ref 1–5.1)
LYMPHOCYTES NFR BLD: 46.7 %
MCH RBC QN AUTO: 30.2 PG (ref 26–34)
MCHC RBC AUTO-ENTMCNC: 34.2 G/DL (ref 31–36)
MCV RBC AUTO: 88.1 FL (ref 80–100)
MONOCYTES # BLD: 0.5 K/UL (ref 0–1.3)
MONOCYTES NFR BLD: 10.1 %
NEUTROPHILS # BLD: 1.8 K/UL (ref 1.7–7.7)
NEUTROPHILS NFR BLD: 39.5 %
PLATELET # BLD AUTO: 255 K/UL (ref 135–450)
PMV BLD AUTO: 7.6 FL (ref 5–10.5)
POTASSIUM SERPL-SCNC: 4.6 MMOL/L (ref 3.5–5.1)
PROT SERPL-MCNC: 7 G/DL (ref 6.4–8.2)
RBC # BLD AUTO: 4.03 M/UL (ref 4–5.2)
SODIUM SERPL-SCNC: 137 MMOL/L (ref 136–145)
WBC # BLD AUTO: 4.5 K/UL (ref 4–11)

## 2024-11-01 PROCEDURE — 85379 FIBRIN DEGRADATION QUANT: CPT

## 2024-11-01 PROCEDURE — 80053 COMPREHEN METABOLIC PANEL: CPT

## 2024-11-01 PROCEDURE — 85025 COMPLETE CBC W/AUTO DIFF WBC: CPT

## 2024-11-01 PROCEDURE — 36415 COLL VENOUS BLD VENIPUNCTURE: CPT

## 2024-11-01 PROCEDURE — 99283 EMERGENCY DEPT VISIT LOW MDM: CPT

## 2024-11-01 NOTE — ED PROVIDER NOTES
[facility]  EMERGENCY DEPARTMENT ENCOUNTER      Pt Name: Nicky Ellis  MRN: [id number]  Birthdate 1955  Date of evaluation: 11/1/2024  Provider: PRATIMA CABRAL DO    CHIEF COMPLAINT  Chief Complaint   Patient presents with    Leg Pain     Able to walk back to room independently, no limp.  Pt reports intermittent left lower leg pain mostly when sitting for 10 mins or longer. Symptoms for 2 weeks.  No pain now.  Pain starts behind left knee and just below posterior left knee.   Natural sensation/skin color.   Palpable bilat dors pedis/post tib pulses, brisk cap refill to lower legs.    Pt doing outpt physical therapy for bilat hip pain/sciatica pain.   Concerned about possible blood clot.  No hx of blood clots.         This patient is at risk for a communicable infection.  Therefore, personal protection equipment consisting of a mask was worn for the exam.    HPI  Nicky Ellis is a 68 y.o. female who presents with pain in her posterior left knee and calf.  It started a few days ago.  Is gotten progressively worse.  She has been to physical therapy and thinks he might of strained it.  However, she is concerned about a DVT.  She is going to [place] soon on a long trip and wants to make sure she does not have a DVT.  She denies a previous history of DVTs and has no clotting disorders.    REVIEW OF SYSTEMS  All systems negative except as noted in the HPI.  Reviewed Nurses' notes and concur.    Patient's last menstrual period was 05/23/2005 (approximate).    PAST MEDICAL HISTORY  Past Medical History:   Diagnosis Date    Essential hypertension 8/20/2018    History of 2019 novel coronavirus disease (COVID-19) 6/1/2022 5 2022    History of ankle fracture 5/23/2015    History of colonic polyps 10/23/2020    10.2020 sig tics, polyp ascending x 2 recheck 2025 Telephone Encounter - Igor Holloway MD - 10/09/2014 10:23 AM EDT Doing better since hospital, finishing antibiotics. ICV

## 2024-11-01 NOTE — DISCHARGE INSTRUCTIONS
Please continue your present treatments and medications until you receive further instructions from your healthcare professional. Take all your medications as prescribed unless a healthcare professional instructs you to do otherwise.    You may take Tylenol (acetaminophen) for pain or fever, if you are permitted to take these medications. Please follow package directions for the appropriate dosing and frequency.

## 2024-11-02 NOTE — ED NOTES
No pain.   Discharge instructions with pt.  Encouraged OTC tylenol/motrin for discomfort.   Explained diagnosis.

## 2024-11-02 NOTE — ED NOTES
Able to walk back to room independently, no limp.  Pt reports intermittent left lower leg pain mostly when sitting for 10 mins or longer. Symptoms for 2 weeks.  No pain now.  Pain starts behind left knee and just below posterior left knee.   Natural sensation/skin color.   Palpable bilat dors pedis/post tib pulses, brisk cap refill to lower legs.    Pt doing outpt physical therapy for bilat hip pain/sciatica pain.   Concerned about possible blood clot.  No hx of blood clots.

## 2024-11-05 ENCOUNTER — HOSPITAL ENCOUNTER (OUTPATIENT)
Dept: PHYSICAL THERAPY | Age: 69
Setting detail: THERAPIES SERIES
Discharge: HOME | End: 2024-11-05
Payer: MEDICARE

## 2024-11-05 PROCEDURE — 97110 THERAPEUTIC EXERCISES: CPT | Performed by: PHYSICAL THERAPIST

## 2024-11-05 PROCEDURE — 97140 MANUAL THERAPY 1/> REGIONS: CPT | Performed by: PHYSICAL THERAPIST

## 2024-11-05 NOTE — FLOWSHEET NOTE
[facility] [address], [place] [address] office: [phone] fax: [phone]     Physical Therapy Initial Evaluation Certification      Dear Maurilio Collier MD,    We had the pleasure of evaluating the following patient for physical therapy services at [facility].  A summary of our findings can be found in the initial assessment below.  This includes our plan of care.  If you have any questions or concerns regarding these findings, please do not hesitate to contact me at the office phone number listed above.  Thank you for the referral.     Physician Signature:_______________________________Date:__________________  By signing above (or electronic signature), therapist’s plan is approved by physician       Physical Therapy: TREATMENT/PROGRESS NOTE   Patient: Nicky Ellis (68 y.o. female)   Examination Date: 2024   :  1955 MRN: [id number]   Visit #:   Insurance Allowable Auth Needed   mn []Yes    [x]No    Insurance: Payor: MEDICARE / Plan: MEDICARE PART A AND B / Product Type: *No Product type* /   Insurance ID: [id number] - (Medicare)  Secondary Insurance (if applicable): [employer]   Treatment Diagnosis:     ICD-10-CM    1. Difficulty walking  R26.2       2. Decreased functional mobility  R26.89       3. Decreased functional activity tolerance  R68.89       4. Difficulty performing work activities  Z56.89       5. Functional weakness  R53.1       6. Need for home exercise program  Z78.9       7. Decreased ability to perform activities  R68.89       8. Decreased exercise tolerance  R68.89          Medical Diagnosis:  Left hip pain [M25.552]  Lateral pain of left hip [M25.552]  Chronic right SI joint pain [M53.3, G89.29]   Referring Physician: Maurilio Collier MD  PCP: Maurilio Collier MD     Plan of care signed (Y/N): routed    Date of Patient follow up with Physician:      Plan of Care

## 2024-12-01 DIAGNOSIS — I10 ESSENTIAL HYPERTENSION: ICD-10-CM

## 2024-12-02 RX ORDER — LISINOPRIL 10 MG/1
TABLET ORAL
Qty: 90 TABLET | Refills: 0 | Status: SHIPPED | OUTPATIENT
Start: 2024-12-02

## 2024-12-02 NOTE — TELEPHONE ENCOUNTER
Future Appointments   Date Time Provider Department Center   3/19/2025  8:30 AM Maurilio Collier MD [facility] BS ECC DEP       Last appt 9/17/24

## 2024-12-06 DIAGNOSIS — I10 ESSENTIAL HYPERTENSION: ICD-10-CM

## 2024-12-09 RX ORDER — LISINOPRIL 10 MG/1
TABLET ORAL
Qty: 90 TABLET | Refills: 0 | OUTPATIENT
Start: 2024-12-09

## 2024-12-27 DIAGNOSIS — M81.0 AGE RELATED OSTEOPOROSIS, UNSPECIFIED PATHOLOGICAL FRACTURE PRESENCE: ICD-10-CM

## 2024-12-27 NOTE — TELEPHONE ENCOUNTER
Medication:   Requested Prescriptions     Pending Prescriptions Disp Refills    alendronate (FOSAMAX) 70 MG tablet [Pharmacy Med Name: ALENDRONATE SODIUM 70 MG TAB] 12 tablet 2     Sig: TAKE 1 TABLET BY MOUTH ONCE WEEKLY ON AN EMPTY STOMACH BEFORE BREAKFAST. REMAIN UPRIGHT FOR 30 MINUTES AND TAKE WITH 8 OUNCES OF WATER       Last Filled:      Patient Phone Number: [phone] (home)     Last appt: 9/17/2024   Next appt: 3/19/2025  Future Appointments   Date Time Provider Department Center   3/19/2025  8:30 AM Maurilio Collier MD [facility] ECC DEP

## 2024-12-30 RX ORDER — ALENDRONATE SODIUM 70 MG/1
TABLET ORAL
Qty: 12 TABLET | Refills: 2 | Status: SHIPPED | OUTPATIENT
Start: 2024-12-30

## 2025-01-27 ENCOUNTER — OFFICE VISIT (OUTPATIENT)
Dept: INTERNAL MEDICINE CLINIC | Age: 70
End: 2025-01-27
Payer: MEDICARE

## 2025-01-27 VITALS
DIASTOLIC BLOOD PRESSURE: 70 MMHG | HEIGHT: 64 IN | SYSTOLIC BLOOD PRESSURE: 114 MMHG | TEMPERATURE: 98.1 F | WEIGHT: 171.2 LBS | HEART RATE: 81 BPM | OXYGEN SATURATION: 99 % | BODY MASS INDEX: 29.23 KG/M2

## 2025-01-27 DIAGNOSIS — J01.10 ACUTE NON-RECURRENT FRONTAL SINUSITIS: Primary | ICD-10-CM

## 2025-01-27 PROCEDURE — G8399 PT W/DXA RESULTS DOCUMENT: HCPCS | Performed by: STUDENT IN AN ORGANIZED HEALTH CARE EDUCATION/TRAINING PROGRAM

## 2025-01-27 PROCEDURE — G8427 DOCREV CUR MEDS BY ELIG CLIN: HCPCS | Performed by: STUDENT IN AN ORGANIZED HEALTH CARE EDUCATION/TRAINING PROGRAM

## 2025-01-27 PROCEDURE — 3078F DIAST BP <80 MM HG: CPT | Performed by: STUDENT IN AN ORGANIZED HEALTH CARE EDUCATION/TRAINING PROGRAM

## 2025-01-27 PROCEDURE — 99212 OFFICE O/P EST SF 10 MIN: CPT | Performed by: STUDENT IN AN ORGANIZED HEALTH CARE EDUCATION/TRAINING PROGRAM

## 2025-01-27 PROCEDURE — G8417 CALC BMI ABV UP PARAM F/U: HCPCS | Performed by: STUDENT IN AN ORGANIZED HEALTH CARE EDUCATION/TRAINING PROGRAM

## 2025-01-27 PROCEDURE — 3017F COLORECTAL CA SCREEN DOC REV: CPT | Performed by: STUDENT IN AN ORGANIZED HEALTH CARE EDUCATION/TRAINING PROGRAM

## 2025-01-27 PROCEDURE — 1159F MED LIST DOCD IN RCRD: CPT | Performed by: STUDENT IN AN ORGANIZED HEALTH CARE EDUCATION/TRAINING PROGRAM

## 2025-01-27 PROCEDURE — 1090F PRES/ABSN URINE INCON ASSESS: CPT | Performed by: STUDENT IN AN ORGANIZED HEALTH CARE EDUCATION/TRAINING PROGRAM

## 2025-01-27 PROCEDURE — 3074F SYST BP LT 130 MM HG: CPT | Performed by: STUDENT IN AN ORGANIZED HEALTH CARE EDUCATION/TRAINING PROGRAM

## 2025-01-27 PROCEDURE — 1123F ACP DISCUSS/DSCN MKR DOCD: CPT | Performed by: STUDENT IN AN ORGANIZED HEALTH CARE EDUCATION/TRAINING PROGRAM

## 2025-01-27 PROCEDURE — 1036F TOBACCO NON-USER: CPT | Performed by: STUDENT IN AN ORGANIZED HEALTH CARE EDUCATION/TRAINING PROGRAM

## 2025-01-27 RX ORDER — BENZONATATE 100 MG/1
100-200 CAPSULE ORAL 3 TIMES DAILY PRN
Qty: 21 CAPSULE | Refills: 0 | Status: SHIPPED | OUTPATIENT
Start: 2025-01-27 | End: 2025-02-03

## 2025-01-27 RX ORDER — FLUTICASONE PROPIONATE 50 MCG
2 SPRAY, SUSPENSION (ML) NASAL DAILY
Qty: 16 G | Refills: 0 | Status: SHIPPED | OUTPATIENT
Start: 2025-01-27

## 2025-01-27 ASSESSMENT — PATIENT HEALTH QUESTIONNAIRE - PHQ9
2. FEELING DOWN, DEPRESSED OR HOPELESS: NOT AT ALL
SUM OF ALL RESPONSES TO PHQ QUESTIONS 1-9: 0
SUM OF ALL RESPONSES TO PHQ9 QUESTIONS 1 & 2: 0
1. LITTLE INTEREST OR PLEASURE IN DOING THINGS: NOT AT ALL
SUM OF ALL RESPONSES TO PHQ QUESTIONS 1-9: 0

## 2025-01-27 NOTE — PATIENT INSTRUCTIONS
Fluticasone 1-2 sprays daily in each nostril for sinus congestion. Use for at least 1-2 weeks consistently.

## 2025-01-27 NOTE — PROGRESS NOTES
Nicky Ellis (:  1955) is a 69 y.o. female,Established patient, here for evaluation of the following chief complaint(s):  Sinusitis (Had a cold a week ago; now gotten worse; fever 101.3, chills, HA, drainage, sore throat, taking Tylenol & Sudafed//Last dose of Sudafed about 730 am)    Nicky fallon 69 year old female presents to the office for complaints of  cold like symptoms for a little over a week. She started with symptoms of congestion, rhinorrhea, and mild sore throat. Does report ill exposures at home that she is aware of. She reports symptoms worsened over the weekend. She has had persistent headache, post nasal drip, fever, chills, lightheadedness, intermittent cough, and sore throat. She has been taking tylenol and sudafed with mild relief in her symptoms.      Assessment & Plan  Acute non-recurrent frontal sinusitis   Acute condition, new, Supportive care with appropriate antipyretics and fluids.    - START Augmentin as ordered. Use Flonase daily x 1- 2 weeks. Use tessalon perles as needed for cough.   Orders:    amoxicillin-clavulanate (AUGMENTIN) 875-125 MG per tablet; Take 1 tablet by mouth 2 times daily for 7 days    fluticasone (FLONASE) 50 MCG/ACT nasal spray; 2 sprays by Each Nostril route daily    benzonatate (TESSALON) 100 MG capsule; Take 1-2 capsules by mouth 3 times daily as needed for Cough      No follow-ups on file.       Subjective       Review of Systems   Constitutional:  Positive for chills, fatigue and fever.   HENT:  Positive for congestion, postnasal drip, rhinorrhea, sinus pressure and sinus pain. Negative for ear pain.    Respiratory:  Positive for cough. Negative for shortness of breath and wheezing.    Cardiovascular:  Negative for chest pain.   Gastrointestinal:  Negative for diarrhea, nausea and vomiting.   Neurological:  Positive for light-headedness and headaches (with cough). Negative for dizziness and weakness.          Objective   Physical Exam  Vitals reviewed.

## 2025-01-28 ASSESSMENT — ENCOUNTER SYMPTOMS
RHINORRHEA: 1
SINUS PRESSURE: 1
COUGH: 1
NAUSEA: 0
SINUS PAIN: 1
SHORTNESS OF BREATH: 0
DIARRHEA: 0
VOMITING: 0
WHEEZING: 0

## 2025-01-29 ENCOUNTER — TELEPHONE (OUTPATIENT)
Dept: INTERNAL MEDICINE CLINIC | Age: 70
End: 2025-01-29

## 2025-01-29 RX ORDER — ONDANSETRON 4 MG/1
4 TABLET, ORALLY DISINTEGRATING ORAL 3 TIMES DAILY PRN
Qty: 21 TABLET | Refills: 0 | Status: SHIPPED | OUTPATIENT
Start: 2025-01-29

## 2025-01-29 NOTE — TELEPHONE ENCOUNTER
Pt called, she is not feeling much better, has been on the antibiotics for 2 days.    Wants to know is something can be sent in for nausea?    Please advise    Thank you    Anisha Cary

## 2025-02-25 DIAGNOSIS — I10 ESSENTIAL HYPERTENSION: ICD-10-CM

## 2025-02-26 DIAGNOSIS — E78.00 HYPERCHOLESTEROLEMIA: ICD-10-CM

## 2025-02-26 RX ORDER — PRAVASTATIN SODIUM 40 MG
TABLET ORAL
Qty: 90 TABLET | Refills: 1 | Status: SHIPPED | OUTPATIENT
Start: 2025-02-26

## 2025-02-26 RX ORDER — LISINOPRIL 10 MG/1
TABLET ORAL
Qty: 90 TABLET | Refills: 0 | Status: SHIPPED | OUTPATIENT
Start: 2025-02-26

## 2025-02-26 NOTE — TELEPHONE ENCOUNTER
Future Appointments   Date Time Provider Department Center   3/19/2025  8:30 AM Maurilio Collier MD [facility] BS ECC DEP       Last appt 1/27/25  
145

## 2025-02-26 NOTE — TELEPHONE ENCOUNTER
Future Appointments   Date Time Provider Department Center   3/19/2025  8:30 AM Maurilio Collier MD [facility] BS E[facility] DEP       Last appt 1/27/25

## 2025-03-16 SDOH — ECONOMIC STABILITY: FOOD INSECURITY: WITHIN THE PAST 12 MONTHS, YOU WORRIED THAT YOUR FOOD WOULD RUN OUT BEFORE YOU GOT MONEY TO BUY MORE.: NEVER TRUE

## 2025-03-16 SDOH — ECONOMIC STABILITY: FOOD INSECURITY: WITHIN THE PAST 12 MONTHS, THE FOOD YOU BOUGHT JUST DIDN'T LAST AND YOU DIDN'T HAVE MONEY TO GET MORE.: NEVER TRUE

## 2025-03-16 SDOH — ECONOMIC STABILITY: INCOME INSECURITY: IN THE LAST 12 MONTHS, WAS THERE A TIME WHEN YOU WERE NOT ABLE TO PAY THE MORTGAGE OR RENT ON TIME?: NO

## 2025-03-16 SDOH — ECONOMIC STABILITY: TRANSPORTATION INSECURITY
IN THE PAST 12 MONTHS, HAS THE LACK OF TRANSPORTATION KEPT YOU FROM MEDICAL APPOINTMENTS OR FROM GETTING MEDICATIONS?: NO

## 2025-03-18 NOTE — PROGRESS NOTES
encounter more than 45 minutes spent in patient care involving a thorough chart review during the patient's previous notes, test results, medications.  We also had a face-to-face encounter and discussed in detail regarding the treatment plan.    Please note that this chart was generated using dragon dictation software.  Although every effort was made to ensure the accuracy of this automated transcription, some errors in transcription may have occurred.    An electronic signature was used to authenticate this note.    --Maurilio Collier MD

## 2025-03-19 ENCOUNTER — OFFICE VISIT (OUTPATIENT)
Dept: INTERNAL MEDICINE CLINIC | Age: 70
End: 2025-03-19

## 2025-03-19 VITALS
HEART RATE: 64 BPM | DIASTOLIC BLOOD PRESSURE: 84 MMHG | OXYGEN SATURATION: 97 % | WEIGHT: 166 LBS | SYSTOLIC BLOOD PRESSURE: 118 MMHG | BODY MASS INDEX: 28.48 KG/M2

## 2025-03-19 DIAGNOSIS — E78.00 HYPERCHOLESTEROLEMIA: ICD-10-CM

## 2025-03-19 DIAGNOSIS — M81.0 AGE RELATED OSTEOPOROSIS, UNSPECIFIED PATHOLOGICAL FRACTURE PRESENCE: ICD-10-CM

## 2025-03-19 DIAGNOSIS — M19.90 ARTHRITIS: ICD-10-CM

## 2025-03-19 DIAGNOSIS — J30.2 SEASONAL ALLERGIES: ICD-10-CM

## 2025-03-19 DIAGNOSIS — I10 ESSENTIAL HYPERTENSION: ICD-10-CM

## 2025-03-19 DIAGNOSIS — R73.03 PREDIABETES: ICD-10-CM

## 2025-03-19 DIAGNOSIS — I10 ESSENTIAL HYPERTENSION: Primary | ICD-10-CM

## 2025-03-19 LAB
ALBUMIN SERPL-MCNC: 4.7 G/DL (ref 3.4–5)
ALBUMIN/GLOB SERPL: 2 {RATIO} (ref 1.1–2.2)
ALP SERPL-CCNC: 57 U/L (ref 40–129)
ALT SERPL-CCNC: 24 U/L (ref 10–40)
ANION GAP SERPL CALCULATED.3IONS-SCNC: 11 MMOL/L (ref 3–16)
AST SERPL-CCNC: 26 U/L (ref 15–37)
BILIRUB SERPL-MCNC: 0.5 MG/DL (ref 0–1)
BUN SERPL-MCNC: 17 MG/DL (ref 7–20)
CALCIUM SERPL-MCNC: 9.8 MG/DL (ref 8.3–10.6)
CHLORIDE SERPL-SCNC: 103 MMOL/L (ref 99–110)
CHOLEST SERPL-MCNC: 172 MG/DL (ref 0–199)
CO2 SERPL-SCNC: 25 MMOL/L (ref 21–32)
CREAT SERPL-MCNC: 0.7 MG/DL (ref 0.6–1.2)
GFR SERPLBLD CREATININE-BSD FMLA CKD-EPI: >90 ML/MIN/{1.73_M2}
GLUCOSE SERPL-MCNC: 90 MG/DL (ref 70–99)
HBA1C MFR BLD: 5.7 %
HDLC SERPL-MCNC: 81 MG/DL (ref 40–60)
LDL CHOLESTEROL: 79 MG/DL
POTASSIUM SERPL-SCNC: 4.4 MMOL/L (ref 3.5–5.1)
PROT SERPL-MCNC: 7 G/DL (ref 6.4–8.2)
SODIUM SERPL-SCNC: 139 MMOL/L (ref 136–145)
TRIGL SERPL-MCNC: 58 MG/DL (ref 0–150)
TSH SERPL DL<=0.005 MIU/L-ACNC: 1.41 UIU/ML (ref 0.27–4.2)
VLDLC SERPL CALC-MCNC: 12 MG/DL

## 2025-03-19 SDOH — ECONOMIC STABILITY: FOOD INSECURITY: WITHIN THE PAST 12 MONTHS, THE FOOD YOU BOUGHT JUST DIDN'T LAST AND YOU DIDN'T HAVE MONEY TO GET MORE.: NEVER TRUE

## 2025-03-19 SDOH — ECONOMIC STABILITY: FOOD INSECURITY: WITHIN THE PAST 12 MONTHS, YOU WORRIED THAT YOUR FOOD WOULD RUN OUT BEFORE YOU GOT MONEY TO BUY MORE.: NEVER TRUE

## 2025-03-19 ASSESSMENT — ENCOUNTER SYMPTOMS
EYE DISCHARGE: 0
SHORTNESS OF BREATH: 0
ABDOMINAL PAIN: 0
EYES NEGATIVE: 1
ALLERGIC/IMMUNOLOGIC NEGATIVE: 1
RESPIRATORY NEGATIVE: 1
GASTROINTESTINAL NEGATIVE: 1

## 2025-03-19 NOTE — ASSESSMENT & PLAN NOTE
Stable  Continue with Pravachol 40 mg daily pending labs.  Extensive discussion made about pros and cons of being on statin.  Patient has a family history of Alzheimer, expressed she would like to start keto diet.  If successful then can consider tapering down her medication.    Orders:    Lipid, Fasting; Future    Insulin, Fasting; Future    Comprehensive Metabolic Panel; Future

## 2025-03-19 NOTE — ASSESSMENT & PLAN NOTE
Chronic  Control  Continue with the current dose of lisinopril 10 mg daily.    Orders:    Lipid, Fasting; Future    Insulin, Fasting; Future    Comprehensive Metabolic Panel; Future    TSH reflex to FT4; Future

## 2025-03-20 ENCOUNTER — RESULTS FOLLOW-UP (OUTPATIENT)
Dept: INTERNAL MEDICINE CLINIC | Age: 70
End: 2025-03-20

## 2025-03-20 PROBLEM — Z86.16 HISTORY OF 2019 NOVEL CORONAVIRUS DISEASE (COVID-19): Status: RESOLVED | Noted: 2022-06-01 | Resolved: 2025-03-20

## 2025-03-21 LAB
INSULIN COMMENT: NORMAL
INSULIN REFERENCE RANGE:: NORMAL
INSULIN SERPL-ACNC: 5.8 MU/L

## 2025-05-07 ENCOUNTER — TELEMEDICINE (OUTPATIENT)
Dept: INTERNAL MEDICINE CLINIC | Age: 70
End: 2025-05-07

## 2025-05-07 DIAGNOSIS — Z00.00 MEDICARE ANNUAL WELLNESS VISIT, SUBSEQUENT: Primary | ICD-10-CM

## 2025-05-07 ASSESSMENT — PATIENT HEALTH QUESTIONNAIRE - PHQ9
SUM OF ALL RESPONSES TO PHQ QUESTIONS 1-9: 0
SUM OF ALL RESPONSES TO PHQ QUESTIONS 1-9: 0
1. LITTLE INTEREST OR PLEASURE IN DOING THINGS: NOT AT ALL
2. FEELING DOWN, DEPRESSED OR HOPELESS: NOT AT ALL
SUM OF ALL RESPONSES TO PHQ QUESTIONS 1-9: 0
SUM OF ALL RESPONSES TO PHQ QUESTIONS 1-9: 0

## 2025-05-07 ASSESSMENT — LIFESTYLE VARIABLES
HOW MANY STANDARD DRINKS CONTAINING ALCOHOL DO YOU HAVE ON A TYPICAL DAY: 1 OR 2
HOW OFTEN DO YOU HAVE A DRINK CONTAINING ALCOHOL: MONTHLY OR LESS

## 2025-05-22 ENCOUNTER — TELEMEDICINE (OUTPATIENT)
Dept: INTERNAL MEDICINE CLINIC | Age: 70
End: 2025-05-22

## 2025-05-22 ENCOUNTER — TELEPHONE (OUTPATIENT)
Dept: INTERNAL MEDICINE CLINIC | Age: 70
End: 2025-05-22

## 2025-05-22 DIAGNOSIS — M25.552 PAIN OF LEFT HIP: ICD-10-CM

## 2025-05-22 DIAGNOSIS — M54.42 CHRONIC MIDLINE LOW BACK PAIN WITH LEFT-SIDED SCIATICA: Primary | ICD-10-CM

## 2025-05-22 DIAGNOSIS — G89.29 CHRONIC MIDLINE LOW BACK PAIN WITH LEFT-SIDED SCIATICA: Primary | ICD-10-CM

## 2025-05-22 DIAGNOSIS — Z02.89 ENCOUNTER TO OBTAIN EXCUSE FROM WORK: ICD-10-CM

## 2025-05-22 ASSESSMENT — ENCOUNTER SYMPTOMS
ALLERGIC/IMMUNOLOGIC NEGATIVE: 1
SHORTNESS OF BREATH: 0
ABDOMINAL PAIN: 0
RESPIRATORY NEGATIVE: 1
EYES NEGATIVE: 1
EYE DISCHARGE: 0
GASTROINTESTINAL NEGATIVE: 1

## 2025-05-22 NOTE — PROGRESS NOTES
Negative.     Eyes: Negative.  Negative for discharge.   Respiratory: Negative.  Negative for shortness of breath.    Cardiovascular: Negative.  Negative for chest pain and palpitations.   Gastrointestinal: Negative.  Negative for abdominal pain.   Endocrine: Negative.    Genitourinary: Negative.    Musculoskeletal: Negative.         Hip pain     Skin: Negative.    Allergic/Immunologic: Negative.    Neurological: Negative.  Negative for dizziness and headaches.   Hematological: Negative.    Psychiatric/Behavioral: Negative.            Objective   Patient-Reported Vitals  No data recorded     Physical Exam  [INSTRUCTIONS:  \"[x]\" Indicates a positive item  \"[]\" Indicates a negative item  -- DELETE ALL ITEMS NOT EXAMINED]    Constitutional: [x] Appears well-developed and well-nourished [x] No apparent distress      [] Abnormal -     Mental status: [x] Alert and awake  [x] Oriented to person/place/time [x] Able to follow commands    [] Abnormal -     Eyes:   EOM    [x]  Normal    [] Abnormal -   Sclera  [x]  Normal    [] Abnormal -          Discharge [x]  None visible   [] Abnormal -     HENT: [x] Normocephalic, atraumatic  [] Abnormal -   [x] Mouth/Throat: Mucous membranes are moist    External Ears [x] Normal  [] Abnormal -    Neck: [x] No visualized mass [] Abnormal -     Pulmonary/Chest: [x] Respiratory effort normal   [x] No visualized signs of difficulty breathing or respiratory distress        [] Abnormal -      Musculoskeletal:   [x] Normal gait with no signs of ataxia         [x] Normal range of motion of neck        [] Abnormal -     Neurological:        [x] No Facial Asymmetry (Cranial nerve 7 motor function) (limited exam due to video visit)          [x] No gaze palsy        [] Abnormal -          Skin:        [x] No significant exanthematous lesions or discoloration noted on facial skin         [] Abnormal -            Psychiatric:       [x] Normal Affect [] Abnormal -        [x] No Hallucinations    Other

## 2025-05-22 NOTE — TELEPHONE ENCOUNTER
Pt called, she received a summons for jury duty.    Can you write her a letter for jury duty, due to her back pain? She cannot sit for long periods of time    ID #: [id number]  Group: [id number]  Date: June 16, 2025    Fax: [phone]    Please advise    Thank you

## 2025-05-27 DIAGNOSIS — I10 ESSENTIAL HYPERTENSION: ICD-10-CM

## 2025-05-27 RX ORDER — LISINOPRIL 10 MG/1
10 TABLET ORAL DAILY
Qty: 90 TABLET | Refills: 1 | Status: SHIPPED | OUTPATIENT
Start: 2025-05-27

## 2025-05-27 NOTE — TELEPHONE ENCOUNTER
Future Appointments   Date Time Provider Department Center   7/15/2025  8:15 AM Maurilio Collier MD [facility] ECC DEP

## 2025-07-17 NOTE — PROGRESS NOTES
Nicky Ellis (:  1955) is a 69 y.o. female,, here for evaluation of the following chief complaint(s):  Hypertension    Chronic health issues include hypertension, DJD, family history adenomatous polyp, prediabetes., bunion, achilis tendinitis, plantar plate tear anemia, diverticulitis.     Transient eyelid edema it from  resolved with medication.  No recurrence.  ----Hx anemia leukopenia.  No recurrent infections.     - Achilles tendinitis of both lower extremities-improves with meloxicam.  Patient has sulfa allergy, not been on celecoxib.  Follows Dr Joshua and continued on  conservative therapy.     Colonoscopy  polyp recheck ..  Family history adenomatous polyps.      Mammogram uptodate  Colonoscopy  polyp recheck .  Dexa- osteoporosis, on fosamax, tolerating well. Exercise- yoga, dog walk.    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    ASSESSMENT/PLAN:    Assessment & Plan  Essential hypertension   Chronic, at goal (stable), continue current treatment plan    Orders:    Lipid, Fasting; Future    Comprehensive Metabolic Panel; Future    CBC with Auto Differential; Future    Prediabetes   Chronic, at goal (stable), continue current plan pending work up below    Orders:    POCT glycosylated hemoglobin (Hb A1C)    Lipid, Fasting; Future    Comprehensive Metabolic Panel; Future    CBC with Auto Differential; Future    Hypercholesterolemia  Chronic  Currently on pravastatin 40 mg daily.  Patient has started ketogenic diet, recheck her labs.    Orders:    Lipid, Fasting; Future    Comprehensive Metabolic Panel; Future    Patient on ketogenic diet  For couple of months, has been able to lose weight.  Now has started to incorporate fibers in her diet.  Encouraged to stay well-hydrated.  Labs ordered.    Orders:    Lipid, Fasting; Future    Comprehensive Metabolic Panel; Future    CBC with Auto Differential; Future          Return in about 6

## 2025-07-18 ENCOUNTER — OFFICE VISIT (OUTPATIENT)
Dept: INTERNAL MEDICINE CLINIC | Age: 70
End: 2025-07-18

## 2025-07-18 VITALS
OXYGEN SATURATION: 97 % | DIASTOLIC BLOOD PRESSURE: 60 MMHG | HEART RATE: 56 BPM | WEIGHT: 151 LBS | SYSTOLIC BLOOD PRESSURE: 112 MMHG | BODY MASS INDEX: 25.91 KG/M2

## 2025-07-18 DIAGNOSIS — E78.00 HYPERCHOLESTEROLEMIA: ICD-10-CM

## 2025-07-18 DIAGNOSIS — I10 ESSENTIAL HYPERTENSION: Primary | ICD-10-CM

## 2025-07-18 DIAGNOSIS — R73.03 PREDIABETES: ICD-10-CM

## 2025-07-18 DIAGNOSIS — Z78.9 PATIENT ON KETOGENIC DIET: ICD-10-CM

## 2025-07-18 LAB
ALBUMIN SERPL-MCNC: 4.5 G/DL (ref 3.4–5)
ALBUMIN/GLOB SERPL: 2.3 {RATIO} (ref 1.1–2.2)
ALP SERPL-CCNC: 43 U/L (ref 40–129)
ALT SERPL-CCNC: 23 U/L (ref 10–40)
ANION GAP SERPL CALCULATED.3IONS-SCNC: 12 MMOL/L (ref 3–16)
AST SERPL-CCNC: 23 U/L (ref 15–37)
BASOPHILS # BLD: 0 K/UL (ref 0–0.2)
BASOPHILS NFR BLD: 1 %
BILIRUB SERPL-MCNC: 0.8 MG/DL (ref 0–1)
BUN SERPL-MCNC: 14 MG/DL (ref 7–20)
CALCIUM SERPL-MCNC: 9.1 MG/DL (ref 8.3–10.6)
CHLORIDE SERPL-SCNC: 96 MMOL/L (ref 99–110)
CHOLEST SERPL-MCNC: 164 MG/DL (ref 0–199)
CO2 SERPL-SCNC: 24 MMOL/L (ref 21–32)
CREAT SERPL-MCNC: 0.6 MG/DL (ref 0.6–1.2)
DEPRECATED RDW RBC AUTO: 14.2 % (ref 12.4–15.4)
EOSINOPHIL # BLD: 0.1 K/UL (ref 0–0.6)
EOSINOPHIL NFR BLD: 3.4 %
GFR SERPLBLD CREATININE-BSD FMLA CKD-EPI: >90 ML/MIN/{1.73_M2}
GLUCOSE SERPL-MCNC: 91 MG/DL (ref 70–99)
HBA1C MFR BLD: 5.5 %
HCT VFR BLD AUTO: 35.2 % (ref 36–48)
HDLC SERPL-MCNC: 83 MG/DL (ref 40–60)
HGB BLD-MCNC: 12.2 G/DL (ref 12–16)
LDL CHOLESTEROL: 74 MG/DL
LYMPHOCYTES # BLD: 1.7 K/UL (ref 1–5.1)
LYMPHOCYTES NFR BLD: 42.4 %
MCH RBC QN AUTO: 29.8 PG (ref 26–34)
MCHC RBC AUTO-ENTMCNC: 34.5 G/DL (ref 31–36)
MCV RBC AUTO: 86.3 FL (ref 80–100)
MONOCYTES # BLD: 0.4 K/UL (ref 0–1.3)
MONOCYTES NFR BLD: 10.5 %
NEUTROPHILS # BLD: 1.7 K/UL (ref 1.7–7.7)
NEUTROPHILS NFR BLD: 42.7 %
PLATELET # BLD AUTO: 216 K/UL (ref 135–450)
PMV BLD AUTO: 7.9 FL (ref 5–10.5)
POTASSIUM SERPL-SCNC: 4.7 MMOL/L (ref 3.5–5.1)
PROT SERPL-MCNC: 6.5 G/DL (ref 6.4–8.2)
RBC # BLD AUTO: 4.08 M/UL (ref 4–5.2)
SODIUM SERPL-SCNC: 132 MMOL/L (ref 136–145)
TRIGL SERPL-MCNC: 37 MG/DL (ref 0–150)
VLDLC SERPL CALC-MCNC: 7 MG/DL
WBC # BLD AUTO: 4 K/UL (ref 4–11)

## 2025-07-18 NOTE — ASSESSMENT & PLAN NOTE
Chronic  Currently on pravastatin 40 mg daily.  Patient has started ketogenic diet, recheck her labs.    Orders:    Lipid, Fasting; Future    Comprehensive Metabolic Panel; Future

## 2025-07-18 NOTE — ASSESSMENT & PLAN NOTE
Chronic, at goal (stable), continue current treatment plan    Orders:    Lipid, Fasting; Future    Comprehensive Metabolic Panel; Future    CBC with Auto Differential; Future

## 2025-08-22 DIAGNOSIS — E78.00 HYPERCHOLESTEROLEMIA: ICD-10-CM

## 2025-08-22 RX ORDER — PRAVASTATIN SODIUM 40 MG
TABLET ORAL
Qty: 90 TABLET | Refills: 1 | Status: SHIPPED | OUTPATIENT
Start: 2025-08-22

## 2025-08-31 DIAGNOSIS — M76.62 ACHILLES TENDINITIS OF BOTH LOWER EXTREMITIES: ICD-10-CM

## 2025-08-31 DIAGNOSIS — M76.61 ACHILLES TENDINITIS OF BOTH LOWER EXTREMITIES: ICD-10-CM

## 2025-09-02 RX ORDER — MELOXICAM 7.5 MG/1
TABLET ORAL
Qty: 60 TABLET | Refills: 2 | Status: SHIPPED | OUTPATIENT
Start: 2025-09-02